# Patient Record
Sex: MALE | Race: OTHER | HISPANIC OR LATINO | Employment: UNEMPLOYED | URBAN - METROPOLITAN AREA
[De-identification: names, ages, dates, MRNs, and addresses within clinical notes are randomized per-mention and may not be internally consistent; named-entity substitution may affect disease eponyms.]

---

## 2019-12-22 ENCOUNTER — HOSPITAL ENCOUNTER (EMERGENCY)
Facility: HOSPITAL | Age: 49
Discharge: HOME | End: 2019-12-22
Attending: EMERGENCY MEDICINE
Payer: MEDICAID

## 2019-12-22 VITALS
OXYGEN SATURATION: 99 % | BODY MASS INDEX: 32.89 KG/M2 | SYSTOLIC BLOOD PRESSURE: 151 MMHG | DIASTOLIC BLOOD PRESSURE: 82 MMHG | TEMPERATURE: 98 F | HEART RATE: 82 BPM | RESPIRATION RATE: 18 BRPM | WEIGHT: 210 LBS

## 2019-12-22 DIAGNOSIS — R73.9 HYPERGLYCEMIA: ICD-10-CM

## 2019-12-22 DIAGNOSIS — R10.9 RIGHT FLANK PAIN: Primary | ICD-10-CM

## 2019-12-22 LAB
ALBUMIN SERPL BCP-MCNC: 3.8 G/DL (ref 3.5–5.2)
ALP SERPL-CCNC: 87 U/L (ref 55–135)
ALT SERPL W/O P-5'-P-CCNC: 29 U/L (ref 10–44)
ANION GAP SERPL CALC-SCNC: 12 MMOL/L (ref 8–16)
AST SERPL-CCNC: 15 U/L (ref 10–40)
B-OH-BUTYR BLD STRIP-SCNC: 0.1 MMOL/L (ref 0–0.5)
BACTERIA #/AREA URNS HPF: ABNORMAL /HPF
BASOPHILS # BLD AUTO: 0.02 K/UL (ref 0–0.2)
BASOPHILS NFR BLD: 0.2 % (ref 0–1.9)
BILIRUB SERPL-MCNC: 0.3 MG/DL (ref 0.1–1)
BILIRUB UR QL STRIP: NEGATIVE
BUN SERPL-MCNC: 15 MG/DL (ref 6–20)
CALCIUM SERPL-MCNC: 9.6 MG/DL (ref 8.7–10.5)
CHLORIDE SERPL-SCNC: 104 MMOL/L (ref 95–110)
CK SERPL-CCNC: 34 U/L (ref 20–200)
CLARITY UR: CLEAR
CO2 SERPL-SCNC: 23 MMOL/L (ref 23–29)
COLOR UR: YELLOW
CREAT SERPL-MCNC: 1 MG/DL (ref 0.5–1.4)
DIFFERENTIAL METHOD: ABNORMAL
EOSINOPHIL # BLD AUTO: 0.3 K/UL (ref 0–0.5)
EOSINOPHIL NFR BLD: 3 % (ref 0–8)
ERYTHROCYTE [DISTWIDTH] IN BLOOD BY AUTOMATED COUNT: 11.8 % (ref 11.5–14.5)
EST. GFR  (AFRICAN AMERICAN): >60 ML/MIN/1.73 M^2
EST. GFR  (NON AFRICAN AMERICAN): >60 ML/MIN/1.73 M^2
GLUCOSE SERPL-MCNC: 315 MG/DL (ref 70–110)
GLUCOSE UR QL STRIP: ABNORMAL
HCT VFR BLD AUTO: 41.3 % (ref 40–54)
HGB BLD-MCNC: 14.1 G/DL (ref 14–18)
HGB UR QL STRIP: ABNORMAL
HYALINE CASTS #/AREA URNS LPF: 0 /LPF
KETONES UR QL STRIP: NEGATIVE
LEUKOCYTE ESTERASE UR QL STRIP: NEGATIVE
LIPASE SERPL-CCNC: 40 U/L (ref 4–60)
LYMPHOCYTES # BLD AUTO: 2.5 K/UL (ref 1–4.8)
LYMPHOCYTES NFR BLD: 23.8 % (ref 18–48)
MCH RBC QN AUTO: 27.5 PG (ref 27–31)
MCHC RBC AUTO-ENTMCNC: 34.1 G/DL (ref 32–36)
MCV RBC AUTO: 81 FL (ref 82–98)
MICROSCOPIC COMMENT: ABNORMAL
MONOCYTES # BLD AUTO: 0.7 K/UL (ref 0.3–1)
MONOCYTES NFR BLD: 6.9 % (ref 4–15)
NEUTROPHILS # BLD AUTO: 6.8 K/UL (ref 1.8–7.7)
NEUTROPHILS NFR BLD: 66.1 % (ref 38–73)
NITRITE UR QL STRIP: NEGATIVE
PH UR STRIP: 6 [PH] (ref 5–8)
PLATELET # BLD AUTO: 251 K/UL (ref 150–350)
PMV BLD AUTO: 9.4 FL (ref 9.2–12.9)
POCT GLUCOSE: 260 MG/DL (ref 70–110)
POCT GLUCOSE: 309 MG/DL (ref 70–110)
POTASSIUM SERPL-SCNC: 4.3 MMOL/L (ref 3.5–5.1)
PROT SERPL-MCNC: 7.1 G/DL (ref 6–8.4)
PROT UR QL STRIP: ABNORMAL
RBC # BLD AUTO: 5.12 M/UL (ref 4.6–6.2)
RBC #/AREA URNS HPF: 10 /HPF (ref 0–4)
SODIUM SERPL-SCNC: 139 MMOL/L (ref 136–145)
SP GR UR STRIP: >=1.03 (ref 1–1.03)
URN SPEC COLLECT METH UR: ABNORMAL
UROBILINOGEN UR STRIP-ACNC: NEGATIVE EU/DL
WBC # BLD AUTO: 10.38 K/UL (ref 3.9–12.7)
WBC #/AREA URNS HPF: 3 /HPF (ref 0–5)
YEAST URNS QL MICRO: ABNORMAL

## 2019-12-22 PROCEDURE — 99284 EMERGENCY DEPT VISIT MOD MDM: CPT | Mod: 25

## 2019-12-22 PROCEDURE — 81000 URINALYSIS NONAUTO W/SCOPE: CPT

## 2019-12-22 PROCEDURE — 85025 COMPLETE CBC W/AUTO DIFF WBC: CPT

## 2019-12-22 PROCEDURE — 82962 GLUCOSE BLOOD TEST: CPT

## 2019-12-22 PROCEDURE — 80053 COMPREHEN METABOLIC PANEL: CPT

## 2019-12-22 PROCEDURE — 82550 ASSAY OF CK (CPK): CPT

## 2019-12-22 PROCEDURE — 63600175 PHARM REV CODE 636 W HCPCS: Performed by: EMERGENCY MEDICINE

## 2019-12-22 PROCEDURE — 96374 THER/PROPH/DIAG INJ IV PUSH: CPT

## 2019-12-22 PROCEDURE — 82010 KETONE BODYS QUAN: CPT

## 2019-12-22 PROCEDURE — 96361 HYDRATE IV INFUSION ADD-ON: CPT

## 2019-12-22 PROCEDURE — 83690 ASSAY OF LIPASE: CPT

## 2019-12-22 RX ORDER — ORPHENADRINE CITRATE 100 MG/1
100 TABLET, EXTENDED RELEASE ORAL 2 TIMES DAILY PRN
Qty: 20 TABLET | Refills: 0 | Status: SHIPPED | OUTPATIENT
Start: 2019-12-22 | End: 2020-01-14

## 2019-12-22 RX ORDER — SODIUM CHLORIDE 9 MG/ML
1000 INJECTION, SOLUTION INTRAVENOUS
Status: COMPLETED | OUTPATIENT
Start: 2019-12-22 | End: 2019-12-22

## 2019-12-22 RX ORDER — IBUPROFEN 800 MG/1
800 TABLET ORAL EVERY 6 HOURS PRN
Qty: 15 TABLET | Refills: 0 | Status: SHIPPED | OUTPATIENT
Start: 2019-12-22 | End: 2019-12-25

## 2019-12-22 RX ORDER — KETOROLAC TROMETHAMINE 30 MG/ML
15 INJECTION, SOLUTION INTRAMUSCULAR; INTRAVENOUS
Status: COMPLETED | OUTPATIENT
Start: 2019-12-22 | End: 2019-12-22

## 2019-12-22 RX ADMIN — KETOROLAC TROMETHAMINE 15 MG: 30 INJECTION, SOLUTION INTRAMUSCULAR at 07:12

## 2019-12-22 RX ADMIN — SODIUM CHLORIDE 1000 ML: 0.9 INJECTION, SOLUTION INTRAVENOUS at 08:12

## 2019-12-23 NOTE — ED NOTES
Physician in room to discuss diagnosis, discharge, and follow up. Pt verbalizes understanding of instructions and prescriptions. Pt also verbalizes understanding of ambulatory referral recommendation.

## 2019-12-23 NOTE — ED PROVIDER NOTES
"Encounter Date: 12/22/2019    SCRIBE #1 NOTE: I, Jesus Alberto Bain, am scribing for, and in the presence of,  Dr. Barreto. I have scribed the following portions of the note - Other sections scribed: HPI, ROS, PE.       History     Chief Complaint   Patient presents with    Back Pain     right sided back pain, that radiates toward right flank x 1 mth, went to ED 2 weeks ago and was dx with muscle pain, had neg CT scan      This is a 49 y.o. Male with history of DM who presents to the ED complaining of lower back pain for several weeks.  His pain is constant. Patient describes his pain "a cut inside his back," "swollen," "muscles are opening/stretched," and as a "itching on the inside." He describes an intermittent numbness to his right leg, but patient is capable of walking when this occurs. Rubbing your fingers on the RLQ occasionally produces pain, but not palpation. 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 Patient took motrin to mild alleviation. A CT scan and lab work were done at a medical facility in [place] which resulted negative for kidney stones and lab work revealed mildly elevated pancreas levels. Patient denies dysuria, hematuria, and rash. He is not compliant with his metformin.       The history is provided by the patient and the spouse. A  was used (Patient's spouse).     Review of patient's allergies indicates:  No Known Allergies  Past Medical History:   Diagnosis Date    Diabetes mellitus      No past surgical history on file.  No family history on file.  Social History     Tobacco Use    Smoking status: Light Tobacco Smoker    Smokeless tobacco: Never Used   Substance Use Topics    Alcohol use: Yes     Comment: 2-3 beers on some weekends    Drug use: Never     Review of Systems   Constitutional: Negative for fever.   HENT: Negative for sore throat.    Respiratory: Negative for shortness of breath.    Cardiovascular: Negative for " chest pain.   Gastrointestinal: Negative for nausea.   Genitourinary: Negative for dysuria and hematuria.   Musculoskeletal: Positive for back pain (with sensation of swelling).   Skin: Negative for rash.   Neurological: Negative for weakness.   Hematological: Does not bruise/bleed easily.       Physical Exam     Initial Vitals [12/22/19 1839]   BP Pulse Resp Temp SpO2   (!) 159/85 83 17 98.8 °F (37.1 °C) 99 %      MAP       --         Physical Exam    Nursing note and vitals reviewed.  Constitutional: He appears well-developed and well-nourished. He is not diaphoretic. No distress.   HENT:   Head: Normocephalic and atraumatic.   Eyes: Conjunctivae are normal.   Neck: Neck supple.   Cardiovascular: Normal rate, regular rhythm, normal heart sounds and intact distal pulses. Exam reveals no gallop and no friction rub.    No murmur heard.  Pulmonary/Chest: Breath sounds normal. No respiratory distress. He has no wheezes. He has no rhonchi. He has no rales.   Abdominal: Soft.   Musculoskeletal: Normal range of motion.        Cervical back: He exhibits normal range of motion, no tenderness and no bony tenderness.        Thoracic back: He exhibits normal range of motion and no tenderness.        Lumbar back: He exhibits normal range of motion, no tenderness and no bony tenderness.        Back:    Right Flank Tenderness, but no true CVA tenderness. No skin changes, rashes, flank ecchymosis, or other cutaneous abnormalities noted.    Neurological: He is alert and oriented to person, place, and time. He has normal strength. No cranial nerve deficit. Coordination normal. GCS eye subscore is 4. GCS verbal subscore is 5. GCS motor subscore is 6.   Skin: No rash noted. No erythema.         ED Course   Procedures  Labs Reviewed   CBC W/ AUTO DIFFERENTIAL - Abnormal; Notable for the following components:       Result Value    Mean Corpuscular Volume 81 (*)     All other components within normal limits   COMPREHENSIVE METABOLIC  PANEL - Abnormal; Notable for the following components:    Glucose 315 (*)     All other components within normal limits   URINALYSIS, REFLEX TO URINE CULTURE - Abnormal; Notable for the following components:    Specific Gravity, UA >=1.030 (*)     Protein, UA 2+ (*)     Glucose, UA 3+ (*)     Occult Blood UA 2+ (*)     All other components within normal limits    Narrative:     Preferred Collection Type->Urine, Clean Catch   URINALYSIS MICROSCOPIC - Abnormal; Notable for the following components:    RBC, UA 10 (*)     All other components within normal limits    Narrative:     Preferred Collection Type->Urine, Clean Catch   POCT GLUCOSE - Abnormal; Notable for the following components:    POCT Glucose 309 (*)     All other components within normal limits   POCT GLUCOSE - Abnormal; Notable for the following components:    POCT Glucose 260 (*)     All other components within normal limits   LIPASE   BETA - HYDROXYBUTYRATE, SERUM   CK   CK          Imaging Results          X-Ray Lumbar Spine Ap And Lateral (Final result)  Result time 12/22/19 20:23:00    Final result by Ehsan Joseph MD (12/22/19 20:23:00)                 Impression:      No evidence of acute fracture or listhesis of the lumbar spine.    No advanced erosive changes.  MRI of the lumbar spine may be obtained for further evaluation, as clinically warranted.      Electronically signed by: Ehsan Joseph MD  Date:    12/22/2019  Time:    20:23             Narrative:    EXAMINATION:  XR LUMBAR SPINE AP AND LATERAL    CLINICAL HISTORY:  Low back pain, <6wks, no red flags, no prior management;Lumbar radiculopathy, <6wks, no red flags, no prior management;    TECHNIQUE:  AP, lateral and spot images were performed of the lumbar spine.    COMPARISON:  CT scan of the abdomen pelvis dated 12/07/2019.    FINDINGS:  The lumbar alignment is maintained.  The vertebral body heights are maintained.  There is hypertrophy of the posterior elements.  The transverse processes  are intact.  No significant intervertebral disc space narrowing is identified.  The sacroiliac joints are unremarkable.  The paraspinal soft tissues are within normal limits.  There is no evidence of acute fracture or listhesis of the lumbar spine.                                 Medical Decision Making:   ED Management:  - CBC w/diff WNL; H/H stable; no significant leukocytosis  - CMP notable for BG of 315;  no significant electrolyte abnormality; renal function WNL  - Lipase WNL  - CK WNL   - Beta-Hydroxy WNL   - Pt administered IVF; repeat   - Plain radiograph of lumbar spine negative for acute fracture, dislocation or other osseous abnormality per my interpretation, final radiology read   - Pt administered IV ketorolac with improvement of symptoms  - UA with 2+ occult blood, 10 RBCs  - I offered the patient the option of getting a CT renal stone study to evaluate for possible kidney stone, but pt declined as he had a CT done on 12/7/19  - I will give pt a rx for Norflex, ibuprofen (Patient denies any history or current GI bleeding, renal disease, or current use of blood thinners) prn pain; also recommended heating pad, and if possible, decreased physical exertion at work as that may be exacerbating his symptoms (pt states that he has continued to work despite the pain; 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   - The patient is at low risk for an emergent medical condition at this time, and I am of the  belief that that it is safe to discharge the patient from the emergency department.   - The patient is instructed to follow up as outpatient as indicated on the discharge paperwork.    - I have discussed the specifics of the workup with the patient and the patient has verbalized understanding of the details of the workup, the diagnosis, the treatment plan, and the need for outpatient follow-up.    - Although the patient has no emergent etiology today this does not preclude the development of an emergent condition so, in addition, I have advised the patient that they can return to the ED and/or activate EMS at any time with worsening of their symptoms, change of their symptoms, or with any other medical complaint.    - The patient remained comfortable and stable during their visit in the ED.    - Discharge and follow-up instructions discussed with the patient who expressed understanding and willingness to comply with my recommendations.  - Results of all emergency department tests  discussed thoroughly with patient; all patient questions answered; pt in agreement with plan  - Pt instructed to follow up with PCP in 2-3 days for recheck of today's complaints  - Pt given strict emergency department return precautions for any new or worsening of symptoms  - Pt discharged from the emergency department in stable condition, in no acute distress              Scribe Attestation:   Scribe #1: I performed the above scribed service and the documentation accurately describes the services I performed. I attest to the accuracy of the note.                          Clinical Impression:       ICD-10-CM ICD-9-CM   1. Right flank pain R10.9 789.09   2. Hyperglycemia R73.9 790.29            I, Kevin Barreto,  personally performed the services described in this documentation. All medical record entries made by the scribe were at my direction and in my presence.  I have reviewed the chart and agree that the record reflects my personal  performance and is accurate and complete. Kevin Barreto M.D. 10:30 PM12/22/2019                 Kevin Barreto MD  12/22/19 0207

## 2019-12-23 NOTE — ED NOTES
Pt is alert and oriented. C/O right flank pain x one month. He states he was seen in the ER two weeks ago where he was diagnosed with muscle pain and states his CT was negative at that time. States the pain is a 6/10 right now but is sometimes higher than that. The pain is in the right flank area, is tender to palpation and sometimes radiates around his torse to the RUQ area. He says his skin is very tender to light touch at times. Denies painful urination, no trouble urinating, no blood in urine. Denies chest pain and sob. Denies n/v. Is taking Motrin at home with minimal relief. He was prescribed a muscle relaxer when he was seen in the ER two weeks ago without relief also. Denies a recent skin rash. Some tenderness to RUQ with palpation.

## 2020-01-14 ENCOUNTER — OFFICE VISIT (OUTPATIENT)
Dept: FAMILY MEDICINE | Facility: HOSPITAL | Age: 50
End: 2020-01-14
Attending: EMERGENCY MEDICINE
Payer: MEDICAID

## 2020-01-14 VITALS
SYSTOLIC BLOOD PRESSURE: 160 MMHG | DIASTOLIC BLOOD PRESSURE: 91 MMHG | HEART RATE: 79 BPM | WEIGHT: 214.75 LBS | BODY MASS INDEX: 34.51 KG/M2 | HEIGHT: 66 IN

## 2020-01-14 DIAGNOSIS — I10 ESSENTIAL HYPERTENSION: ICD-10-CM

## 2020-01-14 DIAGNOSIS — R10.9 ACUTE RIGHT FLANK PAIN: ICD-10-CM

## 2020-01-14 DIAGNOSIS — E11.65 TYPE 2 DIABETES MELLITUS WITH HYPERGLYCEMIA, WITHOUT LONG-TERM CURRENT USE OF INSULIN: ICD-10-CM

## 2020-01-14 DIAGNOSIS — R31.29 OTHER MICROSCOPIC HEMATURIA: Primary | ICD-10-CM

## 2020-01-14 LAB
BILIRUB SERPL-MCNC: ABNORMAL MG/DL
BLOOD, POC UA: 250
GLUCOSE UR QL STRIP: 50
KETONES UR QL STRIP: NEGATIVE
LEUKOCYTE ESTERASE URINE, POC: NEGATIVE
NITRITE, POC UA: NEGATIVE
PH, POC UA: 5
PROTEIN, POC: 500
SPECIFIC GRAVITY, POC UA: 1.03
UROBILINOGEN, POC UA: NEGATIVE

## 2020-01-14 PROCEDURE — 99213 OFFICE O/P EST LOW 20 MIN: CPT | Performed by: STUDENT IN AN ORGANIZED HEALTH CARE EDUCATION/TRAINING PROGRAM

## 2020-01-14 PROCEDURE — 81003 URINALYSIS AUTO W/O SCOPE: CPT | Performed by: STUDENT IN AN ORGANIZED HEALTH CARE EDUCATION/TRAINING PROGRAM

## 2020-01-14 RX ORDER — INSULIN PUMP SYRINGE, 3 ML
EACH MISCELLANEOUS
Qty: 1 EACH | Refills: 0 | Status: SHIPPED | OUTPATIENT
Start: 2020-01-14 | End: 2023-10-30

## 2020-01-14 RX ORDER — GLYBURIDE 2.5 MG/1
2.5 TABLET ORAL
Qty: 90 TABLET | Refills: 3 | Status: SHIPPED | OUTPATIENT
Start: 2020-01-14 | End: 2023-10-20

## 2020-01-14 RX ORDER — METFORMIN HYDROCHLORIDE 1000 MG/1
1000 TABLET ORAL 2 TIMES DAILY WITH MEALS
Qty: 180 TABLET | Refills: 0 | Status: SHIPPED | OUTPATIENT
Start: 2020-01-14 | End: 2020-04-16

## 2020-01-14 RX ORDER — LANCETS 33 GAUGE
1 EACH MISCELLANEOUS DAILY
Qty: 100 EACH | Refills: 11 | Status: SHIPPED | OUTPATIENT
Start: 2020-01-14 | End: 2020-07-20

## 2020-01-14 RX ORDER — LISINOPRIL 20 MG/1
20 TABLET ORAL DAILY
Qty: 90 TABLET | Refills: 3 | Status: SHIPPED | OUTPATIENT
Start: 2020-01-14 | End: 2021-01-13

## 2020-01-14 NOTE — PROGRESS NOTES
Progress Note  [facility]    Subjective:      Wai Bain is a 49 y.o. male with past medical history of T2DM and HTN who is presenting for hospital follow up for right flank/back pain that was worked up in the emergency department twice on 12/7/19 and 12/22/19.     On 12/7, he reported recently lifting a heavy ice chest and pain subsequently following, no other symptoms at that time. Did have elevated lipase but everything else was unremarkable and he was d/c home with robaxin.     On 12/22, he returned to ED with similar symptoms were initially concerning to them for kidney stones but CT and labs were negative for such. Labs wnl, but did have microscopic hematuria. Pt symptoms were thought to be MSK and was to follow up in our clinic.    Pt presents to clinic today with continued flank and back pain, sharp in nature, radiating to his armpit and to his RUQ he states. He thinks it has gotten a little better since going to the ER. Denies any other symptoms. Aggravating factors include working and moving a lot.     Pt also here to establish care and to see someone to manage his HTN and DM for which the only medication he was on is metformin that he states he gets from [place]. Has not been on insulin. BP elevated today in clinic. Blood glucose in hospital charts was 200s, last A1c 13.4 12/7.     UA done in office showing microscopic hematuria and bilirubinuria        Review of Systems   Constitutional: Negative.  Negative for chills and fever.   HENT: Negative.    Eyes: Negative.    Respiratory: Negative for cough and shortness of breath.    Cardiovascular: Negative.  Negative for chest pain and palpitations.   Gastrointestinal: Negative.  Negative for abdominal pain.   Genitourinary: Positive for flank pain. Negative for dysuria, frequency and urgency.   Musculoskeletal: Positive for back pain and myalgias.   Skin: Negative.    Neurological: Negative.    Endo/Heme/Allergies: Negative.   "  Psychiatric/Behavioral: Negative.          Objective:   BP (!) 160/91   Pulse 79   Ht 5' 6" (1.676 m)   Wt 97.4 kg (214 lb 11.7 oz)   BMI 34.66 kg/m²      Physical Exam   Constitutional: He is oriented to person, place, and time. He appears well-developed and well-nourished.   HENT:   Head: Normocephalic and atraumatic.   Eyes: Pupils are equal, round, and reactive to light. Conjunctivae and EOM are normal.   Neck: Normal range of motion. Neck supple.   Cardiovascular: Normal rate, regular rhythm, normal heart sounds and intact distal pulses.   No murmur heard.  Pulmonary/Chest: Effort normal and breath sounds normal.   Abdominal: Soft. Bowel sounds are normal.   Musculoskeletal: Normal range of motion.        Back:    Neurological: He is alert and oriented to person, place, and time.   Skin: Skin is warm and dry.   Psychiatric: He has a normal mood and affect. His behavior is normal. Judgment and thought content normal.   Nursing note and vitals reviewed.       Assessment:   49 y.o. M with unresolved right flank/back pain, persistent microscopic hematuria shown on POCT UA, previous ER work up negative for stones on CT but no bladder or urinary tract work up done. Hematuria in 48 yo warrants urology referral.     T2DM uncontrolled, not on any medications for HTN     1. Other microscopic hematuria    2. Type 2 diabetes mellitus with hyperglycemia, without long-term current use of insulin    3. Essential hypertension    4. Chronic right flank pain    5. Acute right flank pain         Plan:   -Referral to urology   -Refill metformin, start oral med glyburide as pt does not want insulin or injectable medication at this point, will try oral meds and diet and wants to see if can improve without having to go to insulin.   -Start on Ace inhibitor     Other microscopic hematuria  -     Lipid panel; Future; Expected date: 01/14/2020  -     TSH; Future; Expected date: 01/14/2020  -     Microalbumin/creatinine urine " ratio  -     Comprehensive metabolic panel; Future; Expected date: 01/14/2020  -     CBC auto differential; Future; Expected date: 01/14/2020  -     Ambulatory referral to Urology    Type 2 diabetes mellitus with hyperglycemia, without long-term current use of insulin  -     metFORMIN (GLUCOPHAGE) 1000 MG tablet; Take 1 tablet (1,000 mg total) by mouth 2 (two) times daily with meals.  Dispense: 180 tablet; Refill: 0  -     lisinopril (PRINIVIL,ZESTRIL) 20 MG tablet; Take 1 tablet (20 mg total) by mouth once daily.  Dispense: 90 tablet; Refill: 3  -     glyBURIDE (DIABETA) 2.5 MG tablet; Take 1 tablet (2.5 mg total) by mouth daily with breakfast.  Dispense: 90 tablet; Refill: 3  -     blood-glucose meter kit; USE AS DIRECTED  Dispense: 1 each; Refill: 0  -     lancets 33 gauge Misc; Test blood sugar once daily  Dispense: 100 each; Refill: 11  -     blood sugar diagnostic Strp; USE TO TEST BLOOD SUGAR ONCE DAILY  Dispense: 100 strip; Refill: 11  -     Lipid panel; Future; Expected date: 01/14/2020  -     TSH; Future; Expected date: 01/14/2020  -     Microalbumin/creatinine urine ratio  -     Comprehensive metabolic panel; Future; Expected date: 01/14/2020  -     CBC auto differential; Future; Expected date: 01/14/2020  -     POCT URINALYSIS    Essential hypertension  -     Lipid panel; Future; Expected date: 01/14/2020  -     TSH; Future; Expected date: 01/14/2020  -     Microalbumin/creatinine urine ratio  -     Comprehensive metabolic panel; Future; Expected date: 01/14/2020  -     CBC auto differential; Future; Expected date: 01/14/2020    Acute right flank pain  -     Lipid panel; Future; Expected date: 01/14/2020  -     TSH; Future; Expected date: 01/14/2020  -     Microalbumin/creatinine urine ratio  -     Comprehensive metabolic panel; Future; Expected date: 01/14/2020  -     CBC auto differential; Future; Expected date: 01/14/2020  -     Ambulatory referral to Urology  -     POCT URINALYSIS        The  patient's diagnosis and medications were discussed.    I will review labs and notify patient with results either by mail or contact by phone.      01/14/2020  Dayron Buckner M.D., M.Sc.  [facility] PGY-1  Pager: [phone]  Ph: [phone]    *This note was dictated using the M*Modal Fluency Direct word recognition program. There are word rescognition mistakes that are occasionally missed on review.

## 2020-01-21 ENCOUNTER — OFFICE VISIT (OUTPATIENT)
Dept: UROLOGY | Facility: CLINIC | Age: 50
End: 2020-01-21
Payer: MEDICAID

## 2020-01-21 VITALS
TEMPERATURE: 98 F | RESPIRATION RATE: 18 BRPM | WEIGHT: 219 LBS | HEIGHT: 66 IN | DIASTOLIC BLOOD PRESSURE: 87 MMHG | BODY MASS INDEX: 35.2 KG/M2 | OXYGEN SATURATION: 98 % | SYSTOLIC BLOOD PRESSURE: 139 MMHG | HEART RATE: 78 BPM

## 2020-01-21 DIAGNOSIS — R31.29 MICROSCOPIC HEMATURIA: ICD-10-CM

## 2020-01-21 DIAGNOSIS — N50.819 ORCHALGIA: ICD-10-CM

## 2020-01-21 DIAGNOSIS — R10.11 RUQ ABDOMINAL PAIN: Primary | ICD-10-CM

## 2020-01-21 DIAGNOSIS — R10.9 RIGHT FLANK PAIN: ICD-10-CM

## 2020-01-21 LAB
BILIRUB SERPL-MCNC: NORMAL MG/DL
BLOOD URINE, POC: NORMAL
COLOR, POC UA: YELLOW
GLUCOSE UR QL STRIP: 250
KETONES UR QL STRIP: NORMAL
LEUKOCYTE ESTERASE URINE, POC: NORMAL
NITRITE, POC UA: NORMAL
PH, POC UA: 5.5
PROTEIN, POC: 300
SPECIFIC GRAVITY, POC UA: 1.03
UROBILINOGEN, POC UA: 0.2

## 2020-01-21 PROCEDURE — 99999 PR PBB SHADOW E&M-EST. PATIENT-LVL V: CPT | Mod: PBBFAC,,, | Performed by: NURSE PRACTITIONER

## 2020-01-21 PROCEDURE — 99215 OFFICE O/P EST HI 40 MIN: CPT | Mod: PBBFAC,PO | Performed by: NURSE PRACTITIONER

## 2020-01-21 PROCEDURE — 87086 URINE CULTURE/COLONY COUNT: CPT

## 2020-01-21 PROCEDURE — 99999 PR PBB SHADOW E&M-EST. PATIENT-LVL V: ICD-10-PCS | Mod: PBBFAC,,, | Performed by: NURSE PRACTITIONER

## 2020-01-21 PROCEDURE — 99204 OFFICE O/P NEW MOD 45 MIN: CPT | Mod: S$PBB,,, | Performed by: NURSE PRACTITIONER

## 2020-01-21 PROCEDURE — 81002 URINALYSIS NONAUTO W/O SCOPE: CPT | Mod: PBBFAC,PO | Performed by: NURSE PRACTITIONER

## 2020-01-21 PROCEDURE — 99204 PR OFFICE/OUTPT VISIT, NEW, LEVL IV, 45-59 MIN: ICD-10-PCS | Mod: S$PBB,,, | Performed by: NURSE PRACTITIONER

## 2020-01-21 RX ORDER — KETOROLAC TROMETHAMINE 10 MG/1
10 TABLET, FILM COATED ORAL EVERY 6 HOURS PRN
Qty: 20 TABLET | Refills: 0 | Status: SHIPPED | OUTPATIENT
Start: 2020-01-21 | End: 2020-01-26

## 2020-01-21 NOTE — PATIENT INSTRUCTIONS
1. Bladder scan (PVR)  2. Urine dipstick  3. Urine cx  4. U/S retroperitoneal complete  5. U/S scrotum and testicles  6. PSA, total and free (no ejaculation for 72 hours prior to lab).   7. Follow-up pending urine cx and U/S results.

## 2020-01-21 NOTE — LETTER
January 22, 2020      Dayron Buckner MD  [address] [facility] [address]           [facility]  [address] [place] [address], [address]  [facility] [address]  Phone: [phone]  Fax: [phone]          Patient: Wai Bain   MR Number: [id number]   YOB: 1970   Date of Visit: 1/21/2020       Dear Dr. Dayron Buckner:    Thank you for referring Wai Bain to me for evaluation. Attached you will find relevant portions of my assessment and plan of care.    If you have questions, please do not hesitate to call me. I look forward to following Wai Bain along with you.    Sincerely,    Rosario Maki, NP    Enclosure  CC:  No Recipients    If you would like to receive this communication electronically, please contact [email or web address] or [phone] to request more information on [employer] Link access.    For providers and/or their staff who would like to refer a patient to [facility], please contact us through our one-stop-shop provider referral line, [facility] , at [phone].    If you feel you have received this communication in error or would no longer like to receive these types of communications, please e-mail [email or web address]

## 2020-01-21 NOTE — PROGRESS NOTES
Subjective:       Patient ID: Wai Bain is a 49 y.o. male.    Chief Complaint: Hematuria (referred by dr nav garza) and Flank Pain (right side)    Patient is new to me. He is a 48 yo  male. Primary language is Japanese. Patient's wife (Jana) present during visit to help with translation. Patient has c/o RUQ abdominal pain/burning and right flank pain. He's also here for evaluation of microscopic hematuria that was noted by his PCP on 1/14/2020 and ER visit on 12/22/19 and 12/7/19.     Hematuria   This is a new problem. Episode onset: 12/2019. He describes the hematuria as microscopic hematuria. His pain is at a severity of 0/10. He is experiencing no pain. He describes his urine color as yellow. Irritative symptoms do not include frequency, nocturia or urgency. Associated symptoms include abdominal pain (RUQ burning pain) and flank pain. Pertinent negatives include no chills, dysuria, facial swelling, fever, genital pain, hesitancy, inability to urinate, nausea, vomiting or sore throat. He is sexually active. His past medical history is significant for hypertension. There is no history of kidney stones or UTI.   Flank Pain   This is a chronic problem. Episode onset: 2 months ago. The problem occurs daily. The problem has been gradually improving since onset. The pain is present in the costovertebral angle (right). The quality of the pain is described as aching. The pain is at a severity of 2/10. The pain is mild. Associated symptoms include abdominal pain (RUQ burning pain). Pertinent negatives include no bladder incontinence, bowel incontinence, dysuria, fever, headaches, weakness or weight loss. Risk factors include lack of exercise, obesity and sedentary lifestyle. He has tried NSAIDs for the symptoms. The treatment provided mild relief.   Abdominal Pain   This is a new problem. Episode onset: 2 months ago. The onset quality is undetermined. The problem occurs constantly. The problem has  been waxing and waning. The pain is located in the RUQ. The pain is at a severity of 4/10. The pain is moderate. The quality of the pain is burning. The abdominal pain does not radiate. Associated symptoms include constipation. Pertinent negatives include no anorexia, arthralgias, belching, diarrhea, dysuria, fever, flatus, frequency, headaches, hematuria, nausea, vomiting or weight loss. Nothing aggravates the pain. The pain is relieved by nothing. He has tried nothing for the symptoms. There is no history of abdominal surgery, colon cancer, Crohn's disease, gallstones, GERD, irritable bowel syndrome, pancreatitis, PUD or ulcerative colitis. Patient's medical history does not include kidney stones and UTI.     Review of Systems   Constitutional: Negative for appetite change, chills, fatigue, fever and weight loss.   HENT: Negative for facial swelling and sore throat.    Gastrointestinal: Positive for abdominal pain (RUQ burning pain) and constipation. Negative for anorexia, bowel incontinence, diarrhea, flatus, nausea and vomiting.   Genitourinary: Positive for flank pain and testicular pain (bilateral; mild ache). Negative for bladder incontinence, decreased urine volume, difficulty urinating, dysuria, frequency, hematuria, hesitancy, nocturia, penile swelling, scrotal swelling and urgency.        Feeling of incomplete bladder emptying.    Musculoskeletal: Negative for arthralgias.   Neurological: Negative for dizziness, weakness and headaches.   Psychiatric/Behavioral: Negative.        Objective:      Physical Exam   Constitutional: He is oriented to person, place, and time. He appears well-developed and well-nourished. No distress.   HENT:   Head: Normocephalic and atraumatic.   Eyes: Pupils are equal, round, and reactive to light. EOM are normal.   Neck: Normal range of motion.   Cardiovascular: Normal rate.   Pulmonary/Chest: Effort normal. No respiratory distress.   Abdominal: Soft. There is no tenderness.    Genitourinary:   Genitourinary Comments: PVR = 33 mL   Musculoskeletal: Normal range of motion. He exhibits no edema.   Neurological: He is alert and oriented to person, place, and time. Coordination normal.   Skin: Skin is warm and dry.   Psychiatric: He has a normal mood and affect. His behavior is normal. Judgment and thought content normal.   Nursing note and vitals reviewed.      Assessment:       1. RUQ abdominal pain    2. Right flank pain    3. Microscopic hematuria        Plan:       Wai CINTRON was seen today for hematuria and flank pain.    Diagnoses and all orders for this visit:    RUQ abdominal pain  -     US Retroperitoneal Complete (Kidney and; Future  -     POCT URINE DIPSTICK WITHOUT MICROSCOPE  -     Urine culture  -     ketorolac (TORADOL) 10 mg tablet; Take 1 tablet (10 mg total) by mouth every 6 (six) hours as needed for Pain.  -     PSA, total and free; Future    Right flank pain  -     US Retroperitoneal Complete (Kidney and; Future  -     POCT URINE DIPSTICK WITHOUT MICROSCOPE  -     Urine culture  -     ketorolac (TORADOL) 10 mg tablet; Take 1 tablet (10 mg total) by mouth every 6 (six) hours as needed for Pain.  -     PSA, total and free; Future    Microscopic hematuria  -     US Retroperitoneal Complete (Kidney and; Future  -     POCT URINE DIPSTICK WITHOUT MICROSCOPE  -     Urine culture  -     PSA, total and free; Future    Orchalgia  -     US Scrotum And Testicles; Future    Other order  1. Bladder scan (PVR)  2. If urine cx and U/S is normal, refer patient to Gastro for evaluation of RUQ abdominal pain. CT performed on 12/7/19 with no significant findings.     Follow-up pending urine cx and U/S results.    Rosario Maki NP

## 2020-01-21 NOTE — PROGRESS NOTES
I have reviewed the notes, assessments, and/or procedures performed by Dr. Buckner, I concur with her/his documentation of Wai Bain.

## 2020-01-22 LAB — BACTERIA UR CULT: NO GROWTH

## 2020-01-23 ENCOUNTER — TELEPHONE (OUTPATIENT)
Dept: UROLOGY | Facility: CLINIC | Age: 50
End: 2020-01-23

## 2020-01-23 NOTE — TELEPHONE ENCOUNTER
----- Message from Rosario Maki NP sent at 1/23/2020 10:46 AM CST -----  Please inform patient's wife (Jana) that patient's urine cx was normal. He does not have a UTI. Patient's primary language is French. His wife speaks English.

## 2020-01-28 ENCOUNTER — TELEPHONE (OUTPATIENT)
Dept: UROLOGY | Facility: CLINIC | Age: 50
End: 2020-01-28

## 2020-01-28 DIAGNOSIS — R10.11 RUQ ABDOMINAL PAIN: Primary | ICD-10-CM

## 2020-01-28 NOTE — TELEPHONE ENCOUNTER
----- Message from Rosario Maki NP sent at 1/28/2020 11:16 AM CST -----  Please inform patient his renal and bladder U/S was normal.

## 2020-01-28 NOTE — TELEPHONE ENCOUNTER
----- Message from Rosario Maki NP sent at 1/28/2020 11:16 AM CST -----  Please inform patient his PSA was normal (0.37 ng/mL).

## 2020-02-03 ENCOUNTER — TELEPHONE (OUTPATIENT)
Dept: GASTROENTEROLOGY | Facility: CLINIC | Age: 50
End: 2020-02-03

## 2020-02-03 NOTE — TELEPHONE ENCOUNTER
----- Message from Meg Peralta sent at 2/3/2020  9:34 AM CST -----  Contact: Pt   Pt is requesting an appt as soon as possible   Pt has a new patient appt scheduled for 03/26/2020   Pt stated he is in a lot of pain and wants to be seen sooner     Pt can be reached at [phone]

## 2020-02-11 ENCOUNTER — OFFICE VISIT (OUTPATIENT)
Dept: FAMILY MEDICINE | Facility: HOSPITAL | Age: 50
End: 2020-02-11
Attending: FAMILY MEDICINE
Payer: MEDICAID

## 2020-02-11 VITALS
HEIGHT: 67 IN | DIASTOLIC BLOOD PRESSURE: 82 MMHG | WEIGHT: 213.88 LBS | BODY MASS INDEX: 33.57 KG/M2 | HEART RATE: 83 BPM | SYSTOLIC BLOOD PRESSURE: 136 MMHG

## 2020-02-11 DIAGNOSIS — R10.11 RUQ ABDOMINAL PAIN: ICD-10-CM

## 2020-02-11 DIAGNOSIS — K59.00 CONSTIPATION, UNSPECIFIED CONSTIPATION TYPE: ICD-10-CM

## 2020-02-11 DIAGNOSIS — E11.43 DIABETIC GASTROPARESIS: ICD-10-CM

## 2020-02-11 DIAGNOSIS — I10 ESSENTIAL HYPERTENSION: ICD-10-CM

## 2020-02-11 DIAGNOSIS — K31.84 DIABETIC GASTROPARESIS: ICD-10-CM

## 2020-02-11 DIAGNOSIS — E11.65 TYPE 2 DIABETES MELLITUS WITH HYPERGLYCEMIA, WITHOUT LONG-TERM CURRENT USE OF INSULIN: Primary | ICD-10-CM

## 2020-02-11 PROCEDURE — 99213 OFFICE O/P EST LOW 20 MIN: CPT | Performed by: STUDENT IN AN ORGANIZED HEALTH CARE EDUCATION/TRAINING PROGRAM

## 2020-02-11 RX ORDER — POLYETHYLENE GLYCOL 3350 17 G/17G
17 POWDER, FOR SOLUTION ORAL DAILY PRN
Qty: 510 G | Refills: 1 | Status: SHIPPED | OUTPATIENT
Start: 2020-02-11 | End: 2023-04-03

## 2020-02-11 RX ORDER — METOCLOPRAMIDE 10 MG/1
10 TABLET ORAL
Qty: 90 TABLET | Refills: 1 | Status: SHIPPED | OUTPATIENT
Start: 2020-02-11 | End: 2020-03-10

## 2020-02-12 NOTE — PROGRESS NOTES
Progress Note  [facility]    Subjective:      Wai Bain is a 49 y.o. male with past medical history of type 2 diabetes mellitus and hypertension who is here for follow-up.  Patient has had a couple hospital admissions for flank and back pain but was discharged as CT scan and labs were negative.  Patient has had history of microscopic hematuria and went initially saw me in clinic had continued hematuria on urinalysis.  I had referred patient to Urology where workup was determined to be negative that included normal PSA and normal ultrasounds.  Patient was also referred to gastroenterology and has an appointment coming up.    Patient returns to me still having some mild right upper quadrant discomfort but states that his back pain has been resolved.  Patient reveals to me that he has also been constipated hands sometimes does not have a bowel movement for several days.  His constipation is associated with increasingly worse pain in the upper right quadrant of his abdomen.  Patient states he has been taking prune juice which assist him in having a bowel movement and us relieves his abdominal pain temporarily.  Patient is also seeing me for uncontrolled type 2 diabetes mellitus.  When patient had seen me initially he had been off of his diabetes medications for some time and we restarted his metformin along with adding glyburide as patient did not want anything other than oral medications.  Patient's most recent A1c in December was almost 14.  Patient states he has since been compliant with diabetes medication and rehab plan to get a new A1c in the next couple weeks.    Review of Systems   Constitutional: Negative.  Negative for chills and fever.   HENT: Negative.    Eyes: Negative.    Respiratory: Negative for cough and shortness of breath.    Cardiovascular: Negative.  Negative for chest pain and palpitations.   Gastrointestinal: Positive for abdominal pain.   Genitourinary: Negative.   "  Musculoskeletal: Negative.    Skin: Negative.    Neurological: Negative.    Endo/Heme/Allergies: Negative.    Psychiatric/Behavioral: Negative.          Objective:   /82   Pulse 83   Ht 5' 7" (1.702 m)   Wt 97 kg (213 lb 13.5 oz)   BMI 33.49 kg/m²      Physical Exam   Constitutional: He is oriented to person, place, and time and well-developed, well-nourished, and in no distress.   HENT:   Head: Normocephalic and atraumatic.   Eyes: Pupils are equal, round, and reactive to light.   Cardiovascular: Normal rate, regular rhythm, normal heart sounds and intact distal pulses.   Pulmonary/Chest: Effort normal and breath sounds normal.   Abdominal: Soft. Bowel sounds are normal.   Musculoskeletal: Normal range of motion.   Neurological: He is alert and oriented to person, place, and time.   Psychiatric: Affect normal.        Assessment:   49 y.o. with type 2 diabetes mellitus, hypertension, constipation here for follow-up.       1. Type 2 diabetes mellitus with hyperglycemia, without long-term current use of insulin    2. Diabetic gastroparesis    3. Constipation, unspecified constipation type    4. Essential hypertension    5. RUQ abdominal pain         Plan:   Patient continues to have right upper quadrant abdominal pain.  History of microscopic hematuria was referred to urology with negative workup.  Patient has appointment for Gastroenterology.  Back pain has resolved but abdominal pain is still present complicated in worsened by constipation.  Patient also has uncontrolled diabetes which could be leading to worsening of abdominal discomfort and possible gastroparesis.  Will try a course of Reglan and see if this helps with symptoms also plan to repeat A1c as patient states he has been compliant now with diabetes medications and we will be able to appropriately make adjustments to his regimen once we get new labs.  Patient also given a stool softener to use as needed when becomes constipated.    Type 2 " diabetes mellitus with hyperglycemia, without long-term current use of insulin  -     Hemoglobin A1c; Future; Expected date: 02/11/2020    Diabetic gastroparesis  -     metoclopramide HCl (REGLAN) 10 MG tablet; Take 1 tablet (10 mg total) by mouth 3 (three) times daily before meals.  Dispense: 90 tablet; Refill: 1    Constipation, unspecified constipation type  -     polyethylene glycol (GLYCOLAX) 17 gram/dose powder; mix and Take 17 g by mouth daily as needed (for constipation).  Dispense: 510 g; Refill: 1    Essential hypertension    RUQ abdominal pain           The patient's diagnosis and medications were discussed.    I will review labs and notify patient with results either by mail or contact by phone.      02/11/2020  Dayron Buckner M.D., M.Sc.  [facility] PGY-1  Pager: [phone]  Ph: [phone]    *This note was dictated using the M*Modal Fluency Direct word recognition program. There are word rescognition mistakes that are occasionally missed on review.

## 2020-02-12 NOTE — PROGRESS NOTES
I assume primary medical responsibility for this patient, I have reviewed the case history, findings, diagnosis and treatment plan with the resident and agree that the care is reasonable and necessary. This service has been performed by a resident without the presence of a teaching physician under the primary care exception  Estefnai Mendez  2/12/2020

## 2020-03-10 ENCOUNTER — OFFICE VISIT (OUTPATIENT)
Dept: GASTROENTEROLOGY | Facility: CLINIC | Age: 50
End: 2020-03-10
Payer: MEDICAID

## 2020-03-10 VITALS
HEART RATE: 80 BPM | BODY MASS INDEX: 33.67 KG/M2 | WEIGHT: 215 LBS | SYSTOLIC BLOOD PRESSURE: 130 MMHG | DIASTOLIC BLOOD PRESSURE: 78 MMHG

## 2020-03-10 DIAGNOSIS — R10.11 RUQ ABDOMINAL PAIN: Primary | ICD-10-CM

## 2020-03-10 DIAGNOSIS — R68.81 EARLY SATIETY: ICD-10-CM

## 2020-03-10 PROCEDURE — 99204 OFFICE O/P NEW MOD 45 MIN: CPT | Mod: S$PBB,,, | Performed by: INTERNAL MEDICINE

## 2020-03-10 PROCEDURE — 99999 PR PBB SHADOW E&M-EST. PATIENT-LVL III: CPT | Mod: PBBFAC,,, | Performed by: INTERNAL MEDICINE

## 2020-03-10 PROCEDURE — 99999 PR PBB SHADOW E&M-EST. PATIENT-LVL III: ICD-10-PCS | Mod: PBBFAC,,, | Performed by: INTERNAL MEDICINE

## 2020-03-10 PROCEDURE — 99204 PR OFFICE/OUTPT VISIT, NEW, LEVL IV, 45-59 MIN: ICD-10-PCS | Mod: S$PBB,,, | Performed by: INTERNAL MEDICINE

## 2020-03-10 PROCEDURE — 99213 OFFICE O/P EST LOW 20 MIN: CPT | Mod: PBBFAC,PO | Performed by: INTERNAL MEDICINE

## 2020-03-10 RX ORDER — PANTOPRAZOLE SODIUM 40 MG/1
40 TABLET, DELAYED RELEASE ORAL DAILY
Qty: 90 TABLET | Refills: 3 | Status: SHIPPED | OUTPATIENT
Start: 2020-03-10 | End: 2023-04-03

## 2020-03-10 NOTE — PATIENT INSTRUCTIONS
EGD Prep Instructions    [facility] [address] [place]  [address]    You are scheduled for an EGD with Dr. العراقي on Friday, March 13 at [facility].  You will enter through the [facility] entrance and check in at Same Day Surgery.    Nothing to eat or drink after midnight before the procedure.  You MAY brush your teeth.    You MAY take your blood pressure, heart, and seizure medication on the morning of the procedure, with a SIP of water.  Hold ALL other medications until after the procedure.    You must have someone with you to DRIVE YOU HOME since you will be receiving IV sedation for the procedure.    If you are on blood thinners THAT YOU HAVE BEEN INSTRUCTED TO HOLD BY YOUR DOCTOR FOR THIS PROCEDURE, then do NOT take this the morning of your EGD.  Do NOT stop these medications on your own, they must be approved to be held by your doctor.  Your EGD can NOT be done if you are on these medications.  Examples of blood thinners include: Coumadin, Aggrenox, Plavix, Pradaxa, Reapro, Pletal, Xarelto, Ticagrelor, Brilinta, Eliquis, and high dose aspirin (325 mg).  You do not have to stop baby aspirin 81 mg.    You will receive a call a few days before your EGD to tell you the time to arrive.  If you have not received a call by the day before your procedure, call the Pre-op Coordinator at [phone].            Abdominal Pain    Abdominal pain is pain in the stomach or belly area. Everyone has this pain from time to time. In many cases it goes away on its own. But abdominal pain can sometimes be due to a serious problem, such as appendicitis. So its important to know when to seek help.  Causes of abdominal pain  There are many possible causes of abdominal pain. Common causes in adults include:  · Constipation, diarrhea, or gas  · Stomach acid flowing back up into the esophagus (acid reflux or heartburn)  · Severe acid reflux, called GERD (gastroesophageal reflux  disease)  · A sore in the lining of the stomach or small intestine (peptic ulcer)  · Inflammation of the gallbladder, liver, or pancreas  · Gallstones or kidney stones  · Appendicitis   · Intestinal blockage   · An internal organ pushing through a muscle or other tissue (hernia)  · Urinary tract infections  · In women, menstrual cramps, fibroids, or endometriosis  · Inflammation or infection of the intestines  Diagnosing the cause of abdominal pain  Your healthcare provider will do a physical exam help find the cause of your pain. If needed, tests will be ordered. Belly pain has many possible causes. So it can be hard to find the reason for your pain. Giving details about your pain can help. Tell your provider where and when you feel the pain, and what makes it better or worse. Also let your provider know if you have other symptoms such as:  · Fever  · Tiredness  · Upset stomach (nausea)  · Vomiting  · Changes in bathroom habits  Treating abdominal pain  Some causes of pain need emergency medical treatment right away. These include appendicitis or a bowel blockage. Other problems can be treated with rest, fluids, or medicines. Your healthcare provider can give you specific instructions for treatment or self-care based on what is causing your pain.  If you have vomiting or diarrhea, sip water or other clear fluids. When you are ready to eat solid foods again, start with small amounts of easy-to-digest, low-fat foods. These include apple sauce, toast, or crackers.   When to seek medical care  Call 911 or go to the hospital right away if you:  · Cant pass stool and are vomiting  · Are vomiting blood or have bloody diarrhea or black, tarry diarrhea  · Have chest, neck, or shoulder pain  · Feel like you might pass out  · Have pain in your shoulder blades with nausea  · Have sudden, severe belly pain  · Have new, severe pain unlike any you have felt before  · Have a belly that is rigid, hard, and tender to touch  Call  your healthcare provider if you have:  · Pain for more than 5 days  · Bloating for more than 2 days  · Diarrhea for more than 5 days  · A fever of 100.4°F (38.0°C) or higher, or as directed by your provider  · Pain that gets worse  · Weight loss for no reason  · Continued lack of appetite  · Blood in your stool  How to prevent abdominal pain  Here are some tips to help prevent abdominal pain:  · Eat smaller amounts of food at one time.  · Avoid greasy, fried, or other high-fat foods.  · Avoid foods that give you gas.  · Exercise regularly.  · Drink plenty of fluids.  To help prevent GERD symptoms:  · Quit smoking.  · Reduce alcohol and certain foods that increase stomach acid.  · Avoid aspirin and over-the-counter pain and fever medicines (NSAIDS or nonsteroidal anti-inflammatory drugs), if possible  · Lose extra weight.  · Finish eating at least 2 hours before you go to bed or lie down.  · Raise the head of your bed.  Date Last Reviewed: 7/1/2016  © 5772-6284 [employer]. [address] [place] [address], [place] [address]. All rights reserved. This information is not intended as a substitute for professional medical care. Always follow your healthcare professional's instructions.

## 2020-03-10 NOTE — PROGRESS NOTES
"Subjective:       Patient ID: Wai Bain is a 50 y.o. male.    Chief Complaint: Abdominal Pain    51 yo M complains of RUQ pain.  He speaks limited English, but his partner assists with translation.  The pain has been present since 11/2019 and is constant.  It varies in intensity from mild yet constant, to severe after eating.  The pain is described as "burning" and he states that sometimes it is uncomfortable for his shirt to be touching this area, but there is no rash.  He has h/o constipation for which he drinks prune juice every morning.  Without that, he could go 3-4 days without a BM, but with the prune juice he has a good BM every day.  This pain was much more severe prior to drinking the prune juice.  He was given Miralax but does not take it.  He was also given Reglan which he does take, but this has not changed symptoms.  He feels full easily when he eats and gets very bloated, but never vomits.  He denies rectal bleeding or FH of colon cancer.    Review of Systems   Constitutional: Negative for fever and unexpected weight change.   Eyes: Negative for photophobia and visual disturbance.   Respiratory: Negative for chest tightness, shortness of breath and wheezing.    Cardiovascular: Negative for chest pain, palpitations and leg swelling.   Genitourinary: Negative for dysuria, flank pain and hematuria.   Musculoskeletal: Negative for joint swelling and myalgias.   Skin: Negative for color change and rash.   Neurological: Negative for dizziness and speech difficulty.   Psychiatric/Behavioral: Negative for confusion and hallucinations.       Objective:       /78 (BP Location: Left arm, Patient Position: Sitting)   Pulse 80   Wt 97.5 kg (215 lb)   BMI 33.67 kg/m²     Physical Exam   Constitutional: He is oriented to person, place, and time. He appears well-developed and well-nourished.   HENT:   Head: Normocephalic and atraumatic.   Eyes: Pupils are equal, round, and reactive to light. EOM " are normal.   Neck: Normal range of motion. Neck supple.   Cardiovascular: Normal rate, regular rhythm, normal heart sounds and intact distal pulses.   Pulmonary/Chest: Effort normal and breath sounds normal.   Abdominal: Soft. Bowel sounds are normal. He exhibits no distension and no mass. There is no tenderness. There is no rebound and no guarding.   No rash noted, no TTP in RUQ   Musculoskeletal: He exhibits no edema or deformity.   Neurological: He is alert and oriented to person, place, and time.   Skin: Skin is warm and dry.   Psychiatric: He has a normal mood and affect. His behavior is normal.       Lab Results   Component Value Date    WBC 8.95 01/17/2020    HGB 14.4 01/17/2020    HCT 41.7 01/17/2020    MCV 79 (L) 01/17/2020     01/17/2020     Lumbar xary was independently visualized and reviewed by me and showed large amount of stool in the colon.    Assessment:       1. RUQ abdominal pain    2. Early satiety        Plan:       RUQ abdominal pain; Early satiety  -     Case request GI: EGD (ESOPHAGOGASTRODUODENOSCOPY)  -     US Abdomen Complete; Future; Expected date: 03/10/2020  -     pantoprazole (PROTONIX) 40 MG tablet; Take 1 tablet (40 mg total) by mouth once daily.  Dispense: 90 tablet; Refill: 3  -     Stop Reglan    Will plan for colonoscopy after workup complete.

## 2020-03-10 NOTE — LETTER
March 10, 2020      Rosario Maki, NADIYA  [address] [facility]  [address]  [place] [address]           [facility]  [address], [address]   [facility] [address]  Phone: [phone]  Fax: [phone]          Patient: Wai Bain   MR Number: [id number]   YOB: 1970   Date of Visit: 3/10/2020       Dear Rosario Maki:    Thank you for referring Wai Bain to me for evaluation. Attached you will find relevant portions of my assessment and plan of care.    If you have questions, please do not hesitate to call me. I look forward to following Wai Bain along with you.    Sincerely,    Katiuska العراقي MD    Enclosure  CC:  No Recipients    If you would like to receive this communication electronically, please contact [email or web address] or [phone] to request more information on [employer] Link access.    For providers and/or their staff who would like to refer a patient to [facility], please contact us through our one-stop-shop provider referral line, [facility], at [phone].    If you feel you have received this communication in error or would no longer like to receive these types of communications, please e-mail [email or web address]

## 2020-03-13 PROBLEM — R10.11 RUQ PAIN: Status: ACTIVE | Noted: 2020-03-13

## 2020-03-16 DIAGNOSIS — R10.11 RUQ PAIN: Primary | ICD-10-CM

## 2020-03-24 ENCOUNTER — TELEPHONE (OUTPATIENT)
Dept: GASTROENTEROLOGY | Facility: CLINIC | Age: 50
End: 2020-03-24

## 2020-03-24 DIAGNOSIS — K29.70 HELICOBACTER PYLORI GASTRITIS: Primary | ICD-10-CM

## 2020-03-24 DIAGNOSIS — B96.81 HELICOBACTER PYLORI GASTRITIS: Primary | ICD-10-CM

## 2020-03-24 RX ORDER — CLARITHROMYCIN 500 MG/1
500 TABLET, FILM COATED ORAL 2 TIMES DAILY
Qty: 28 TABLET | Refills: 0 | Status: SHIPPED | OUTPATIENT
Start: 2020-03-24 | End: 2020-04-21

## 2020-03-24 RX ORDER — BISMUTH SUBSALICYLATE 262 MG/1
2 TABLET ORAL
Qty: 120 TABLET | Refills: 0 | Status: SHIPPED | OUTPATIENT
Start: 2020-03-24 | End: 2020-04-22

## 2020-03-24 RX ORDER — AMOXICILLIN 500 MG/1
CAPSULE ORAL
Qty: 56 CAPSULE | Refills: 0 | Status: SHIPPED | OUTPATIENT
Start: 2020-03-24 | End: 2020-06-19 | Stop reason: ALTCHOICE

## 2020-03-24 NOTE — TELEPHONE ENCOUNTER
Stomach biopsies HP (+).  Meds sent to his pharmacy.  Be sure to take all meds together and complete all 14 days of treatment.  He will need to take the PPI bid while on tx, then can decrease back to once daily afterward.    This regimen is difficult, warn him.  In one month, if he does not feel better, he will need to come back to clinic for further workup.

## 2020-03-25 NOTE — TELEPHONE ENCOUNTER
----- Message from Jorge Chamorro sent at 3/25/2020 10:51 AM CDT -----  Contact: [phone] / self   Patient is returning a call from your office. Please Advise.

## 2020-04-05 ENCOUNTER — TELEPHONE (OUTPATIENT)
Dept: GASTROENTEROLOGY | Facility: CLINIC | Age: 50
End: 2020-04-05

## 2020-04-05 NOTE — TELEPHONE ENCOUNTER
----- Message from Katiuska العراقي MD sent at 3/16/2020 11:40 AM CDT -----  Stones and sludge in the gallbladder.  I am not sure if the gallbladder is his issue, but I did not see anything significant at EGD.  I am sending referral to gen surgery, please let him know.

## 2020-04-16 DIAGNOSIS — E11.65 TYPE 2 DIABETES MELLITUS WITH HYPERGLYCEMIA, WITHOUT LONG-TERM CURRENT USE OF INSULIN: ICD-10-CM

## 2020-04-16 RX ORDER — METFORMIN HYDROCHLORIDE 1000 MG/1
1000 TABLET ORAL 2 TIMES DAILY WITH MEALS
Qty: 180 TABLET | Refills: 0 | Status: CANCELLED | OUTPATIENT
Start: 2020-04-16 | End: 2020-07-15

## 2020-04-20 DIAGNOSIS — E11.65 TYPE 2 DIABETES MELLITUS WITH HYPERGLYCEMIA, WITHOUT LONG-TERM CURRENT USE OF INSULIN: ICD-10-CM

## 2020-04-20 RX ORDER — METFORMIN HYDROCHLORIDE 1000 MG/1
1000 TABLET ORAL 2 TIMES DAILY WITH MEALS
Qty: 180 TABLET | Refills: 0 | Status: SHIPPED | OUTPATIENT
Start: 2020-04-20 | End: 2023-04-03

## 2020-04-20 NOTE — TELEPHONE ENCOUNTER
----- Message from Zulma Stanton sent at 4/20/2020  1:25 PM CDT -----  Contact: Sudhakar [phone]  Martine patient would like a refill for Metformin 1000mg. Please send to [facility].

## 2020-05-18 ENCOUNTER — TELEPHONE (OUTPATIENT)
Dept: GASTROENTEROLOGY | Facility: CLINIC | Age: 50
End: 2020-05-18

## 2020-05-18 NOTE — TELEPHONE ENCOUNTER
----- Message from Jessica Lin sent at 5/18/2020  8:41 AM CDT -----  Contact: [phone]/self   Type:  Sooner Apoointment Request    Caller is requesting a sooner appointment.  Caller declined first available appointment listed below.  Name of Caller: wife  When is the first available appointment? 06/04  Symptoms: Still having stomach pains  Would the patient rather a call back or a response via [employer]?  call  Best Call Back Number: [phone]/self   Additional Information:

## 2020-05-18 NOTE — TELEPHONE ENCOUNTER
[place] Told patient that Dr. العراقي wants him to have an appointment with a general surgeon to check his gallbladder. Appointment scheduled with Dr. Conner.

## 2020-06-01 ENCOUNTER — OFFICE VISIT (OUTPATIENT)
Dept: SURGERY | Facility: CLINIC | Age: 50
End: 2020-06-01
Payer: MEDICAID

## 2020-06-01 VITALS
HEART RATE: 76 BPM | RESPIRATION RATE: 18 BRPM | WEIGHT: 221.25 LBS | HEIGHT: 67 IN | DIASTOLIC BLOOD PRESSURE: 84 MMHG | BODY MASS INDEX: 34.73 KG/M2 | SYSTOLIC BLOOD PRESSURE: 150 MMHG | OXYGEN SATURATION: 99 %

## 2020-06-01 DIAGNOSIS — K80.20 CALCULUS OF GALLBLADDER WITHOUT CHOLECYSTITIS WITHOUT OBSTRUCTION: Primary | ICD-10-CM

## 2020-06-01 PROCEDURE — 99213 OFFICE O/P EST LOW 20 MIN: CPT | Mod: PBBFAC,PN | Performed by: SURGERY

## 2020-06-01 PROCEDURE — 99999 PR PBB SHADOW E&M-EST. PATIENT-LVL III: CPT | Mod: PBBFAC,,, | Performed by: SURGERY

## 2020-06-01 PROCEDURE — 99204 PR OFFICE/OUTPT VISIT, NEW, LEVL IV, 45-59 MIN: ICD-10-PCS | Mod: S$PBB,,, | Performed by: SURGERY

## 2020-06-01 PROCEDURE — 99204 OFFICE O/P NEW MOD 45 MIN: CPT | Mod: S$PBB,,, | Performed by: SURGERY

## 2020-06-01 PROCEDURE — 99999 PR PBB SHADOW E&M-EST. PATIENT-LVL III: ICD-10-PCS | Mod: PBBFAC,,, | Performed by: SURGERY

## 2020-06-01 NOTE — PROGRESS NOTES
[facility]  OUTPATIENT H&P    REASON FOR VISIT/CC: RUQ pain    HPI: Wai Bain is a 50 y.o. male with right upper quadrant and epigastric pain for months.  Patient also endorses bloating and a burning sensation.  He tends to have worsening of his symptoms when he overeats.  He had an EGD that showed some mild gastritis and was treated for H pylori infection.  Despite the treatment as well as PPI, he has had persistent symptoms.  An abdominal ultrasound was performed that showed gallbladder sludge and stones.  His liver function tests were within normal limits.  He denies nausea vomiting or diarrhea.  He does endorse some sensitivity of the skin of the right upper quadrant but there is no signs of rash or shingles.  He has otherwise been in his baseline state of health.    I have reviewed the patient's chart including prior progress notes, procedures and testing.     ROS:   Review of Systems   Constitutional: Negative for activity change, chills, fatigue, fever and unexpected weight change.   HENT: Negative for facial swelling and trouble swallowing.    Eyes: Negative for visual disturbance.   Respiratory: Negative for cough, chest tightness, shortness of breath and wheezing.    Cardiovascular: Negative for chest pain, palpitations and leg swelling.   Gastrointestinal: Positive for abdominal distention and abdominal pain. Negative for anal bleeding, blood in stool, constipation, diarrhea, nausea and vomiting.   Endocrine: Negative for polydipsia and polyuria.   Genitourinary: Negative for dysuria and hematuria.   Musculoskeletal: Negative for arthralgias and myalgias.   Skin: Negative for color change, pallor and wound.   Allergic/Immunologic: Negative.    Neurological: Negative for syncope and weakness.   Hematological: Negative for adenopathy.   Psychiatric/Behavioral: Negative for agitation, behavioral problems and dysphoric mood.       PROBLEM LIST:  Patient Active Problem List   Diagnosis     Type 2 diabetes mellitus with hyperglycemia, without long-term current use of insulin    Essential hypertension    RUQ abdominal pain    Right flank pain    Microscopic hematuria    Early satiety    RUQ pain         HISTORY  Past Medical History:   Diagnosis Date    Diabetes mellitus     Hypertension     Urinary tract infection        Past Surgical History:   Procedure Laterality Date    ESOPHAGOGASTRODUODENOSCOPY N/A 3/13/2020    Procedure: EGD (ESOPHAGOGASTRODUODENOSCOPY);  Surgeon: Katiuska العراقي MD;  Location: [facility];  Service: Endoscopy;  Laterality: N/A;       Social History     Tobacco Use    Smoking status: Former Smoker     Types: Cigarettes    Smokeless tobacco: Never Used   Substance Use Topics    Alcohol use: Yes     Alcohol/week: 1.0 standard drinks     Types: 1 Cans of beer per week     Comment: social    Drug use: Never       Family History   Problem Relation Age of Onset    Prostate cancer Neg Hx     Kidney disease Neg Hx          MEDS:  Current Outpatient Medications on File Prior to Visit   Medication Sig Dispense Refill    blood sugar diagnostic Strp USE TO TEST BLOOD SUGAR ONCE DAILY 100 strip 11    blood-glucose meter kit USE AS DIRECTED 1 each 0    glyBURIDE (DIABETA) 2.5 MG tablet Take 1 tablet (2.5 mg total) by mouth daily with breakfast. 90 tablet 3    lancets 33 gauge Misc Test blood sugar once daily 100 each 11    lisinopriL (PRINIVIL,ZESTRIL) 20 MG tablet Take 1 tablet (20 mg total) by mouth once daily. 90 tablet 3    metFORMIN (GLUCOPHAGE) 1000 MG tablet Take 1 tablet (1,000 mg total) by mouth 2 (two) times daily with meals. 180 tablet 0    pantoprazole (PROTONIX) 40 MG tablet Take 1 tablet (40 mg total) by mouth once daily. 90 tablet 3    polyethylene glycol (GLYCOLAX) 17 gram/dose powder mix and Take 17 g by mouth daily as needed (for constipation). 510 g 1    amoxicillin (AMOXIL) 500 MG capsule take 2 capsules by mouth 2 times per day for 14 days  (Patient not taking: Reported on 6/1/2020) 56 capsule 0     No current facility-administered medications on file prior to visit.        ALLERGIES:  Review of patient's allergies indicates:  No Known Allergies      VITALS:  Vitals:    06/01/20 0957   BP: (!) 150/84   Pulse: 76   Resp: 18         PHYSICAL EXAM:  Physical Exam   Constitutional: He is oriented to person, place, and time. He appears well-developed and well-nourished. No distress.   HENT:   Head: Normocephalic and atraumatic.   Eyes: Pupils are equal, round, and reactive to light. Conjunctivae are normal. No scleral icterus.   Neck: Neck supple.   Cardiovascular: Regular rhythm.   Pulmonary/Chest: Effort normal.   Abdominal: Soft. He exhibits no distension and no mass. There is no tenderness. There is no rebound and no guarding. No hernia.   Musculoskeletal: Normal range of motion. He exhibits no edema.   Neurological: He is alert and oriented to person, place, and time.   Skin: Skin is warm and dry. No rash noted.   Psychiatric: He has a normal mood and affect. His behavior is normal.         LABS:  Lab Results   Component Value Date    WBC 8.95 01/17/2020    RBC 5.27 01/17/2020    HGB 14.4 01/17/2020    HCT 41.7 01/17/2020     01/17/2020     Lab Results   Component Value Date     (H) 01/17/2020     01/17/2020    K 4.4 01/17/2020     01/17/2020    CO2 25 01/17/2020    BUN 16 01/17/2020    CREATININE 0.78 01/17/2020    CALCIUM 9.9 01/17/2020     Lab Results   Component Value Date    ALT 34 01/17/2020    AST 28 01/17/2020    ALKPHOS 64 01/17/2020    BILITOT 0.6 01/17/2020     No results found for: MG, PHOS    STUDIES:  Abdominal CT scan and ultrasound images and reports were personally reviewed.        ASSESSMENT & PLAN:  50 y.o. male with chronic right upper quadrant pain as well as cholelithiasis.  I do think that the gallstones and sludge are contributing to his symptoms and recommended laparoscopic cholecystectomy.  The  patient at this time would like to try dietary changes before committing to surgery.  I recommend a low-fat diet for the next 4-6 weeks.  If he is still having issues, he will call us back to schedule his surgery.      Dayron Conner M.D., F.A.C.S.  [facility] and General Surgery  [facility]

## 2020-06-01 NOTE — PATIENT INSTRUCTIONS
Low fat diet for 4-6 weeks.  If this does not improve your symptoms, call us to schedule removal of your gallbladder.

## 2020-06-01 NOTE — LETTER
June 1, 2020      Katiuska العراقي MD  [address]  [address]   [facility] [address]           [facility]  [address]  [facility] [address]  Phone: [phone]  Fax: [phone]          Patient: Wai Bain   MR Number: [id number]   YOB: 1970   Date of Visit: 6/1/2020       Dear Dr. Katiuska العراقي:    Thank you for referring Wai Bain to me for evaluation. Attached you will find relevant portions of my assessment and plan of care.    If you have questions, please do not hesitate to call me. I look forward to following Wai Bain along with you.    Sincerely,    Dayron Conner MD    Enclosure  CC:  No Recipients    If you would like to receive this communication electronically, please contact [email or web address] or [phone] to request more information on [facility] Link access.    For providers and/or their staff who would like to refer a patient to [facility], please contact us through our one-stop-shop provider referral line, [facility], at [phone].    If you feel you have received this communication in error or would no longer like to receive these types of communications, please e-mail [email or web address]

## 2020-06-11 ENCOUNTER — TELEPHONE (OUTPATIENT)
Dept: SURGERY | Facility: HOSPITAL | Age: 50
End: 2020-06-11

## 2020-06-11 DIAGNOSIS — Z01.818 PREOP EXAMINATION: Primary | ICD-10-CM

## 2020-06-11 DIAGNOSIS — R10.11 RUQ PAIN: Primary | ICD-10-CM

## 2020-06-11 DIAGNOSIS — K80.20 CHOLELITHIASIS: ICD-10-CM

## 2020-06-11 RX ORDER — SODIUM CHLORIDE 9 MG/ML
INJECTION, SOLUTION INTRAVENOUS CONTINUOUS
Status: CANCELLED | OUTPATIENT
Start: 2020-06-11

## 2020-06-11 NOTE — TELEPHONE ENCOUNTER
I spoke with the patient's wife.  He would like to move forward with scheduling his laparoscopic cholecystectomy.  They would like to have it done the week of the June 22nd through the 26.

## 2020-06-20 ENCOUNTER — LAB VISIT (OUTPATIENT)
Dept: URGENT CARE | Facility: CLINIC | Age: 50
End: 2020-06-20
Payer: MEDICAID

## 2020-06-20 VITALS — TEMPERATURE: 98 F | HEART RATE: 84 BPM | OXYGEN SATURATION: 99 %

## 2020-06-20 DIAGNOSIS — Z01.818 PREOP EXAMINATION: ICD-10-CM

## 2020-06-20 PROCEDURE — U0003 INFECTIOUS AGENT DETECTION BY NUCLEIC ACID (DNA OR RNA); SEVERE ACUTE RESPIRATORY SYNDROME CORONAVIRUS 2 (SARS-COV-2) (CORONAVIRUS DISEASE [COVID-19]), AMPLIFIED PROBE TECHNIQUE, MAKING USE OF HIGH THROUGHPUT TECHNOLOGIES AS DESCRIBED BY CMS-2020-01-R: HCPCS

## 2020-06-22 PROBLEM — K80.20 CHOLELITHIASIS: Status: ACTIVE | Noted: 2020-06-22

## 2020-06-22 LAB — SARS-COV-2 RNA RESP QL NAA+PROBE: NOT DETECTED

## 2020-07-05 ENCOUNTER — NURSE TRIAGE (OUTPATIENT)
Dept: ADMINISTRATIVE | Facility: CLINIC | Age: 50
End: 2020-07-05

## 2020-07-05 NOTE — TELEPHONE ENCOUNTER
Contacted pt on behalf of Post Procedural Symptom Tracker. Pt verified by first and last name and . Pt denies any fever, cough, or difficulty breathing since procedure. Advised pt if these symptoms do arise to contact [facility] or PCP. No follow up needed.done through  [facility]andon [id number].      Reason for Disposition   General information question, no triage required and triager able to answer question    Protocols used: INFORMATION ONLY CALL - NO TRIAGE-A-

## 2020-07-13 ENCOUNTER — OFFICE VISIT (OUTPATIENT)
Dept: SURGERY | Facility: CLINIC | Age: 50
End: 2020-07-13
Payer: MEDICAID

## 2020-07-13 VITALS
HEIGHT: 67 IN | RESPIRATION RATE: 18 BRPM | BODY MASS INDEX: 34.34 KG/M2 | SYSTOLIC BLOOD PRESSURE: 124 MMHG | DIASTOLIC BLOOD PRESSURE: 81 MMHG | TEMPERATURE: 98 F | OXYGEN SATURATION: 98 % | HEART RATE: 79 BPM | WEIGHT: 218.81 LBS

## 2020-07-13 DIAGNOSIS — K80.20 CALCULUS OF GALLBLADDER WITHOUT CHOLECYSTITIS WITHOUT OBSTRUCTION: Primary | ICD-10-CM

## 2020-07-13 PROCEDURE — 99024 PR POST-OP FOLLOW-UP VISIT: ICD-10-PCS | Mod: ,,, | Performed by: SURGERY

## 2020-07-13 PROCEDURE — 99213 OFFICE O/P EST LOW 20 MIN: CPT | Mod: PBBFAC,PN | Performed by: SURGERY

## 2020-07-13 PROCEDURE — 99999 PR PBB SHADOW E&M-EST. PATIENT-LVL III: CPT | Mod: PBBFAC,,, | Performed by: SURGERY

## 2020-07-13 PROCEDURE — 99024 POSTOP FOLLOW-UP VISIT: CPT | Mod: ,,, | Performed by: SURGERY

## 2020-07-13 PROCEDURE — 99999 PR PBB SHADOW E&M-EST. PATIENT-LVL III: ICD-10-PCS | Mod: PBBFAC,,, | Performed by: SURGERY

## 2020-07-13 NOTE — PROGRESS NOTES
[facility]  POST-OP NOTE    HPI: Wai Bain is a 50 y.o. male s/p lap tank.  Doing well.  Some minor aches and pains.      VITALS:  Vitals:    07/13/20 0945   BP: 124/81   Pulse: 79   Resp: 18   Temp: 98 °F (36.7 °C)       PHYSICAL EXAM:  GEN: NAD  HEENT: NCAT  ABD: incisions C/D/I    PATHOLOGY:  Reviewed      ASSESSMENT & PLAN:  Doing well.  RTC as needed.  May take NSAIDS and or Tylenol for minor aches.      Dayron Conner M.D., F.A.C.S.  [facility] and General Surgery  [facility]

## 2020-10-23 DIAGNOSIS — E11.65 TYPE 2 DIABETES MELLITUS WITH HYPERGLYCEMIA, WITHOUT LONG-TERM CURRENT USE OF INSULIN: ICD-10-CM

## 2020-10-23 RX ORDER — METFORMIN HYDROCHLORIDE 1000 MG/1
1000 TABLET ORAL 2 TIMES DAILY WITH MEALS
Qty: 180 TABLET | Refills: 0 | Status: CANCELLED | OUTPATIENT
Start: 2020-10-23 | End: 2021-01-21

## 2022-12-19 ENCOUNTER — OFFICE VISIT (OUTPATIENT)
Dept: OPTOMETRY | Facility: CLINIC | Age: 52
End: 2022-12-19
Payer: MEDICAID

## 2022-12-19 DIAGNOSIS — H52.03 HYPEROPIA OF BOTH EYES WITH ASTIGMATISM AND PRESBYOPIA: ICD-10-CM

## 2022-12-19 DIAGNOSIS — H52.203 HYPEROPIA OF BOTH EYES WITH ASTIGMATISM AND PRESBYOPIA: ICD-10-CM

## 2022-12-19 DIAGNOSIS — H35.033 HYPERTENSIVE RETINOPATHY OF BOTH EYES, GRADE 3: ICD-10-CM

## 2022-12-19 DIAGNOSIS — E11.65 TYPE 2 DIABETES MELLITUS WITH HYPERGLYCEMIA, WITHOUT LONG-TERM CURRENT USE OF INSULIN: ICD-10-CM

## 2022-12-19 DIAGNOSIS — E11.3313 MODERATE NONPROLIFERATIVE DIABETIC RETINOPATHY OF BOTH EYES WITH MACULAR EDEMA ASSOCIATED WITH TYPE 2 DIABETES MELLITUS: Primary | ICD-10-CM

## 2022-12-19 DIAGNOSIS — H52.4 HYPEROPIA OF BOTH EYES WITH ASTIGMATISM AND PRESBYOPIA: ICD-10-CM

## 2022-12-19 DIAGNOSIS — H53.8 BLURRED VISION, BILATERAL: ICD-10-CM

## 2022-12-19 PROCEDURE — 99999 PR PBB SHADOW E&M-EST. PATIENT-LVL II: CPT | Mod: PBBFAC,,, | Performed by: OPTOMETRIST

## 2022-12-19 PROCEDURE — 4010F ACE/ARB THERAPY RXD/TAKEN: CPT | Mod: CPTII,,, | Performed by: OPTOMETRIST

## 2022-12-19 PROCEDURE — 4010F PR ACE/ARB THEARPY RXD/TAKEN: ICD-10-PCS | Mod: CPTII,,, | Performed by: OPTOMETRIST

## 2022-12-19 PROCEDURE — 1159F PR MEDICATION LIST DOCUMENTED IN MEDICAL RECORD: ICD-10-PCS | Mod: CPTII,,, | Performed by: OPTOMETRIST

## 2022-12-19 PROCEDURE — 1159F MED LIST DOCD IN RCRD: CPT | Mod: CPTII,,, | Performed by: OPTOMETRIST

## 2022-12-19 PROCEDURE — 99999 PR PBB SHADOW E&M-EST. PATIENT-LVL II: ICD-10-PCS | Mod: PBBFAC,,, | Performed by: OPTOMETRIST

## 2022-12-19 PROCEDURE — 2022F PR DILATED RETINAL EYE EXAM WITH INTERP/REVIEW: ICD-10-PCS | Mod: CPTII,,, | Performed by: OPTOMETRIST

## 2022-12-19 PROCEDURE — 92015 DETERMINE REFRACTIVE STATE: CPT | Mod: ,,, | Performed by: OPTOMETRIST

## 2022-12-19 PROCEDURE — 92015 PR REFRACTION: ICD-10-PCS | Mod: ,,, | Performed by: OPTOMETRIST

## 2022-12-19 PROCEDURE — 92004 COMPRE OPH EXAM NEW PT 1/>: CPT | Mod: S$PBB,,, | Performed by: OPTOMETRIST

## 2022-12-19 PROCEDURE — 2022F DILAT RTA XM EVC RTNOPTHY: CPT | Mod: CPTII,,, | Performed by: OPTOMETRIST

## 2022-12-19 PROCEDURE — 99212 OFFICE O/P EST SF 10 MIN: CPT | Mod: PBBFAC,PN | Performed by: OPTOMETRIST

## 2022-12-19 PROCEDURE — 92004 PR EYE EXAM, NEW PATIENT,COMPREHESV: ICD-10-PCS | Mod: S$PBB,,, | Performed by: OPTOMETRIST

## 2022-12-19 RX ORDER — GLIMEPIRIDE 2 MG/1
TABLET ORAL
COMMUNITY
Start: 2022-07-19 | End: 2023-10-20

## 2023-01-05 ENCOUNTER — OFFICE VISIT (OUTPATIENT)
Dept: OPHTHALMOLOGY | Facility: CLINIC | Age: 53
End: 2023-01-05
Payer: MEDICAID

## 2023-01-05 DIAGNOSIS — H35.033 HYPERTENSIVE RETINOPATHY, BILATERAL: ICD-10-CM

## 2023-01-05 DIAGNOSIS — E11.3513 TYPE 2 DIABETES MELLITUS WITH BOTH EYES AFFECTED BY PROLIFERATIVE RETINOPATHY AND MACULAR EDEMA, WITHOUT LONG-TERM CURRENT USE OF INSULIN: Primary | ICD-10-CM

## 2023-01-05 DIAGNOSIS — H25.13 NS (NUCLEAR SCLEROSIS), BILATERAL: ICD-10-CM

## 2023-01-05 PROCEDURE — 99999 PR PBB SHADOW E&M-EST. PATIENT-LVL III: CPT | Mod: PBBFAC,,, | Performed by: OPHTHALMOLOGY

## 2023-01-05 PROCEDURE — 67028 INJECTION EYE DRUG: CPT | Mod: 50,S$PBB,, | Performed by: OPHTHALMOLOGY

## 2023-01-05 PROCEDURE — 1160F PR REVIEW ALL MEDS BY PRESCRIBER/CLIN PHARMACIST DOCUMENTED: ICD-10-PCS | Mod: CPTII,,, | Performed by: OPHTHALMOLOGY

## 2023-01-05 PROCEDURE — 67028 INJECTION EYE DRUG: CPT | Mod: 50,PBBFAC,PO | Performed by: OPHTHALMOLOGY

## 2023-01-05 PROCEDURE — 1159F MED LIST DOCD IN RCRD: CPT | Mod: CPTII,,, | Performed by: OPHTHALMOLOGY

## 2023-01-05 PROCEDURE — 67028 PR INJECT INTRAVITREAL PHARMCOLOGIC: ICD-10-PCS | Mod: 50,S$PBB,, | Performed by: OPHTHALMOLOGY

## 2023-01-05 PROCEDURE — 92004 PR EYE EXAM, NEW PATIENT,COMPREHESV: ICD-10-PCS | Mod: 25,S$PBB,, | Performed by: OPHTHALMOLOGY

## 2023-01-05 PROCEDURE — 92004 COMPRE OPH EXAM NEW PT 1/>: CPT | Mod: 25,S$PBB,, | Performed by: OPHTHALMOLOGY

## 2023-01-05 PROCEDURE — 1160F RVW MEDS BY RX/DR IN RCRD: CPT | Mod: CPTII,,, | Performed by: OPHTHALMOLOGY

## 2023-01-05 PROCEDURE — 92134 CPTRZ OPH DX IMG PST SGM RTA: CPT | Mod: PBBFAC,PO | Performed by: OPHTHALMOLOGY

## 2023-01-05 PROCEDURE — 99999 PR PBB SHADOW E&M-EST. PATIENT-LVL III: ICD-10-PCS | Mod: PBBFAC,,, | Performed by: OPHTHALMOLOGY

## 2023-01-05 PROCEDURE — 1159F PR MEDICATION LIST DOCUMENTED IN MEDICAL RECORD: ICD-10-PCS | Mod: CPTII,,, | Performed by: OPHTHALMOLOGY

## 2023-01-05 PROCEDURE — 99213 OFFICE O/P EST LOW 20 MIN: CPT | Mod: PBBFAC,PO,25 | Performed by: OPHTHALMOLOGY

## 2023-01-05 PROCEDURE — 92134 POSTERIOR SEGMENT OCT RETINA (OCULAR COHERENCE TOMOGRAPHY)-BOTH EYES: ICD-10-PCS | Mod: 26,S$PBB,, | Performed by: OPHTHALMOLOGY

## 2023-01-05 RX ORDER — FLUORESCEIN 500 MG/ML
5 INJECTION INTRAVENOUS ONCE
Status: COMPLETED | OUTPATIENT
Start: 2023-01-05 | End: 2023-02-09

## 2023-01-05 RX ADMIN — Medication 1.25 MG: at 11:01

## 2023-01-05 NOTE — PROGRESS NOTES
HPI    New pt ref by Dr. Sotomayor    DM retinopathy    Patient here today with c/o blurry VA ou with black spot in VA OD, no pain   or f/f.  Last edited by Lynne Peralta on 1/5/2023  9:39 AM.          OCT - Complex DME OU        A/P    Likely PDR OU  T2 uncontrolled without insulin    Check FU FA    2. Complex DME OU    Avastin OU today    Get approval for Ozurdex    3. HTN Ret OU  BS/BP/chol control    4. Early NS OU      1 month OCT/FA OD      Risks, benefits, and alternatives to treatment discussed in detail with the patient.  The patient voiced understanding and wished to proceed with the procedure    Injection Procedure Note:  Diagnosis: DME OU    Patient Identified and Time Out complete  Pt marked  Topical Proparacaine and Betadine.  Inject Avastin OU at 6:00 @ 3.5-4mm posterior to limbus  Post Operative Dx: Same  Complications: None  Follow up as above.

## 2023-02-09 ENCOUNTER — PROCEDURE VISIT (OUTPATIENT)
Dept: OPHTHALMOLOGY | Facility: CLINIC | Age: 53
End: 2023-02-09
Payer: MEDICAID

## 2023-02-09 DIAGNOSIS — H35.033 HYPERTENSIVE RETINOPATHY, BILATERAL: ICD-10-CM

## 2023-02-09 DIAGNOSIS — E11.3513 TYPE 2 DIABETES MELLITUS WITH BOTH EYES AFFECTED BY PROLIFERATIVE RETINOPATHY AND MACULAR EDEMA, WITHOUT LONG-TERM CURRENT USE OF INSULIN: Primary | ICD-10-CM

## 2023-02-09 DIAGNOSIS — H25.13 NS (NUCLEAR SCLEROSIS), BILATERAL: ICD-10-CM

## 2023-02-09 PROCEDURE — 92014 COMPRE OPH EXAM EST PT 1/>: CPT | Mod: 25,S$PBB,, | Performed by: OPHTHALMOLOGY

## 2023-02-09 PROCEDURE — 92235 FLUORESCEIN ANGIOGRAPHY - OU - BOTH EYES: ICD-10-PCS | Mod: 26,S$PBB,, | Performed by: OPHTHALMOLOGY

## 2023-02-09 PROCEDURE — 67028 INJECTION EYE DRUG: CPT | Mod: 50,S$PBB,, | Performed by: OPHTHALMOLOGY

## 2023-02-09 PROCEDURE — 92235 FLUORESCEIN ANGRPH MLTIFRAME: CPT | Mod: 50,PBBFAC,PO | Performed by: OPHTHALMOLOGY

## 2023-02-09 PROCEDURE — 92134 CPTRZ OPH DX IMG PST SGM RTA: CPT | Mod: PBBFAC,PO | Performed by: OPHTHALMOLOGY

## 2023-02-09 PROCEDURE — 92014 PR EYE EXAM, EST PATIENT,COMPREHESV: ICD-10-PCS | Mod: 25,S$PBB,, | Performed by: OPHTHALMOLOGY

## 2023-02-09 PROCEDURE — 92134 POSTERIOR SEGMENT OCT RETINA (OCULAR COHERENCE TOMOGRAPHY)-BOTH EYES: ICD-10-PCS | Mod: 26,S$PBB,, | Performed by: OPHTHALMOLOGY

## 2023-02-09 PROCEDURE — 67028 INJECTION EYE DRUG: CPT | Mod: 50,PBBFAC,PO | Performed by: OPHTHALMOLOGY

## 2023-02-09 PROCEDURE — 67028 PR INJECT INTRAVITREAL PHARMCOLOGIC: ICD-10-PCS | Mod: 50,S$PBB,, | Performed by: OPHTHALMOLOGY

## 2023-02-09 RX ADMIN — Medication 1.25 MG: at 10:02

## 2023-02-09 RX ADMIN — FLUORESCEIN 500 MG: 500 INJECTION INTRAVENOUS at 10:02

## 2023-02-09 NOTE — PROGRESS NOTES
HPI     Concerns About Ocular Health     Additional comments: OCT/ FA/ OD            Comments    53 Y/o male is here for OCT/ FA/OD Pt states vision feels a little better   since last visit   Pt denies pain and discomfort   Pt states he is seeing occasional floaters cannot distinguish which eye.    Eye exam: Visine OU BID           HPI    New pt ref by Dr. Sotomayor    DM retinopathy    Patient here today with c/o blurry VA ou with black spot in VA OD, no pain   or f/f.  Last edited by Lynne Peralta on 1/5/2023  9:39 AM.          OCT - Complex DME OU  Improving    WFFA - NV OU with late macular leakage OU        A/P    PDR OU  T2 uncontrolled without insulin    Will need PRP OU    2. Complex DME OU  S/p Avastin OU x 1    Avastin OU today    Approved for Ozurdex from medicaid, but not good days - working on out of pocket cost for patient    3. HTN Ret OU  BS/BP/chol control    4. Early NS OU      1 month Avastin OU only      Risks, benefits, and alternatives to treatment discussed in detail with the patient.  The patient voiced understanding and wished to proceed with the procedure    Injection Procedure Note:  Diagnosis: DME OU    Patient Identified and Time Out complete  Pt marked  Topical Proparacaine and Betadine.  Inject Avastin OU at 6:00 @ 3.5-4mm posterior to limbus  Post Operative Dx: Same  Complications: None  Follow up as above.

## 2023-02-23 NOTE — PROGRESS NOTES
After obtaining consent, and per orders of Dr. Alexis Nuno NP, injection of MMR given SQ and Havrix given IM in LVL, Prevnar given IM in RVL. Patient tolerated well. HPI    Presents today to establish diabetic eye care. Pt reports blurry vision   OD>OS. Black spot in center of vision OD for couple months. Pt denies eye   pain, flashes and floaters. Use Visine gtts--PRN  Eye screening 3mo ago revealed diabetic retinopathy OD    Hemoglobin A1C       Date                     Value               Ref Range             Status                06/22/2020               9.5 (H)             4.0 - 5.6 %           Final                   12/07/2019               13.4 (H)            4.0 - 5.6 %           Final                Last edited by Trinh Oviedo, OD on 12/19/2022 11:05 AM.        ROS    Positive for: Endocrine, Eyes  Negative for: Constitutional, Gastrointestinal, Neurological, Skin,   Genitourinary, Musculoskeletal, HENT, Cardiovascular, Respiratory,   Psychiatric, Allergic/Imm, Heme/Lymph  Last edited by Trinh Oviedo, OD on 12/19/2022 10:35 AM.        Assessment /Plan     For exam results, see Encounter Report.    Moderate nonproliferative diabetic retinopathy of both eyes with macular edema associated with type 2 diabetes mellitus    Hypertensive retinopathy of both eyes, grade 3    Hyperopia of both eyes with astigmatism and presbyopia    Type 2 diabetes mellitus with hyperglycemia, without long-term current use of insulin    Blurred vision, bilateral    1-2. Sxs of diabetic and hypertensive retinopathy today. Last A1c 9.5% (Jun 2022). Pt treats diabetes and htn with medication. Referred to retina for diabetic and hypertensive retinal findings OU. Pt thoroughly ed on findings and importance of blood sugar control, bp control, and regular f/u with PCP or endocrinologist.   3. Withheld srx today until retinal pathology subside.   RTC for annual LESLIE or sooner prn.

## 2023-03-09 ENCOUNTER — OFFICE VISIT (OUTPATIENT)
Dept: OPHTHALMOLOGY | Facility: CLINIC | Age: 53
End: 2023-03-09
Payer: MEDICAID

## 2023-03-09 DIAGNOSIS — E11.3513 TYPE 2 DIABETES MELLITUS WITH BOTH EYES AFFECTED BY PROLIFERATIVE RETINOPATHY AND MACULAR EDEMA, WITHOUT LONG-TERM CURRENT USE OF INSULIN: Primary | ICD-10-CM

## 2023-03-09 PROCEDURE — 67028 INJECTION EYE DRUG: CPT | Mod: 50,PBBFAC | Performed by: OPHTHALMOLOGY

## 2023-03-09 PROCEDURE — 67028 INJECTION EYE DRUG: CPT | Mod: 50,S$PBB,, | Performed by: OPHTHALMOLOGY

## 2023-03-09 PROCEDURE — 1160F RVW MEDS BY RX/DR IN RCRD: CPT | Mod: CPTII,,, | Performed by: OPHTHALMOLOGY

## 2023-03-09 PROCEDURE — 99499 NO LOS: ICD-10-PCS | Mod: S$PBB,,, | Performed by: OPHTHALMOLOGY

## 2023-03-09 PROCEDURE — 67028 PR INJECT INTRAVITREAL PHARMCOLOGIC: ICD-10-PCS | Mod: 50,S$PBB,, | Performed by: OPHTHALMOLOGY

## 2023-03-09 PROCEDURE — 99999 PR PBB SHADOW E&M-EST. PATIENT-LVL II: CPT | Mod: PBBFAC,,, | Performed by: OPHTHALMOLOGY

## 2023-03-09 PROCEDURE — 1160F PR REVIEW ALL MEDS BY PRESCRIBER/CLIN PHARMACIST DOCUMENTED: ICD-10-PCS | Mod: CPTII,,, | Performed by: OPHTHALMOLOGY

## 2023-03-09 PROCEDURE — 1159F PR MEDICATION LIST DOCUMENTED IN MEDICAL RECORD: ICD-10-PCS | Mod: CPTII,,, | Performed by: OPHTHALMOLOGY

## 2023-03-09 PROCEDURE — 99499 UNLISTED E&M SERVICE: CPT | Mod: S$PBB,,, | Performed by: OPHTHALMOLOGY

## 2023-03-09 PROCEDURE — 1159F MED LIST DOCD IN RCRD: CPT | Mod: CPTII,,, | Performed by: OPHTHALMOLOGY

## 2023-03-09 PROCEDURE — 99212 OFFICE O/P EST SF 10 MIN: CPT | Mod: PBBFAC,25 | Performed by: OPHTHALMOLOGY

## 2023-03-09 PROCEDURE — 99999 PR PBB SHADOW E&M-EST. PATIENT-LVL II: ICD-10-PCS | Mod: PBBFAC,,, | Performed by: OPHTHALMOLOGY

## 2023-03-09 RX ADMIN — Medication 1.25 MG: at 09:03

## 2023-03-09 NOTE — PROGRESS NOTES
HPI    Patient here today for 1 mo Avastin ou inject only. VA improved but still   some central distortion OD, no pain or f/f.  Last edited by Lynne Peralta on 3/9/2023  9:32 AM.            OCT - Complex DME OU  Improving    Prior WFFA - NV OU with late macular leakage OU        A/P    PDR OU  T2 uncontrolled without insulin    Will need PRP OU    2. Complex DME OU  S/p Avastin OU x 2    Avastin OU today    Approved for Ozurdex from medicaid, but not good days - working on out of pocket cost for patient    3. HTN Ret OU  BS/BP/chol control    4. Early NS OU      1 month OCT No dilate      Risks, benefits, and alternatives to treatment discussed in detail with the patient.  The patient voiced understanding and wished to proceed with the procedure    Injection Procedure Note:  Diagnosis: DME OU    Patient Identified and Time Out complete  Pt marked  Topical Proparacaine and Betadine.  Inject Avastin OU at 6:00 @ 3.5-4mm posterior to limbus  Post Operative Dx: Same  Complications: None  Follow up as above.

## 2023-06-02 ENCOUNTER — TELEPHONE (OUTPATIENT)
Dept: VASCULAR SURGERY | Facility: CLINIC | Age: 53
End: 2023-06-02

## 2023-06-02 DIAGNOSIS — Z01.818 PRE-OP EVALUATION: Primary | ICD-10-CM

## 2023-06-02 NOTE — TELEPHONE ENCOUNTER
Contacted pt's wife Jana to schedule appt from referral for HD access creation. Jana states pt doesn't have insurance coverage at this time and would like to call back to schedule when this is set up. Contact information for clinic given to Jana and notified her that if pt does not have insurance but needs to be seen he should contact [facility]. Jana verbalized understanding.

## 2023-10-03 ENCOUNTER — TELEPHONE (OUTPATIENT)
Dept: OPHTHALMOLOGY | Facility: CLINIC | Age: 53
End: 2023-10-03
Payer: MEDICAID

## 2023-10-05 ENCOUNTER — TELEPHONE (OUTPATIENT)
Dept: OPHTHALMOLOGY | Facility: CLINIC | Age: 53
End: 2023-10-05
Payer: MEDICAID

## 2023-10-05 NOTE — TELEPHONE ENCOUNTER
----- Message from Danielle Trevino sent at 10/5/2023  8:54 AM CDT -----  Regarding: APPT  Contact: [phone]  Patient is calling to reschedule missed procedure from may. Please contact pt

## 2023-10-21 ENCOUNTER — HOSPITAL ENCOUNTER (INPATIENT)
Facility: HOSPITAL | Age: 53
LOS: 4 days | Discharge: HOME | DRG: 683 | End: 2023-10-25
Attending: STUDENT IN AN ORGANIZED HEALTH CARE EDUCATION/TRAINING PROGRAM | Admitting: STUDENT IN AN ORGANIZED HEALTH CARE EDUCATION/TRAINING PROGRAM
Payer: MEDICAID

## 2023-10-21 DIAGNOSIS — I16.1 HYPERTENSIVE EMERGENCY: ICD-10-CM

## 2023-10-21 DIAGNOSIS — N18.5 CKD (CHRONIC KIDNEY DISEASE), STAGE V: Primary | ICD-10-CM

## 2023-10-21 DIAGNOSIS — D64.9 ANEMIA, UNSPECIFIED TYPE: ICD-10-CM

## 2023-10-21 DIAGNOSIS — R07.9 CHEST PAIN: ICD-10-CM

## 2023-10-21 DIAGNOSIS — R06.02 SHORTNESS OF BREATH: ICD-10-CM

## 2023-10-21 DIAGNOSIS — R93.89 ABNORMAL CT OF THE CHEST: ICD-10-CM

## 2023-10-21 DIAGNOSIS — R05.9 COUGH IN ADULT: ICD-10-CM

## 2023-10-21 PROBLEM — N18.9 ANEMIA DUE TO CHRONIC KIDNEY DISEASE: Status: ACTIVE | Noted: 2023-10-21

## 2023-10-21 PROBLEM — E66.01 SEVERE OBESITY (BMI >= 40): Status: ACTIVE | Noted: 2023-10-21

## 2023-10-21 PROBLEM — D63.1 ANEMIA DUE TO CHRONIC KIDNEY DISEASE: Status: ACTIVE | Noted: 2023-10-21

## 2023-10-21 LAB
ABO + RH BLD: NORMAL
ALBUMIN SERPL BCP-MCNC: 2.4 G/DL (ref 3.5–5.2)
ALP SERPL-CCNC: 54 U/L (ref 55–135)
ALT SERPL W/O P-5'-P-CCNC: 11 U/L (ref 10–44)
ANION GAP SERPL CALC-SCNC: 17 MMOL/L (ref 8–16)
AORTIC ROOT ANNULUS: 2.87 CM
AST SERPL-CCNC: 13 U/L (ref 10–40)
AV INDEX (PROSTH): 0.64
AV MEAN GRADIENT: 6 MMHG
AV PEAK GRADIENT: 12 MMHG
AV VALVE AREA BY VELOCITY RATIO: 1.93 CM²
AV VALVE AREA: 2.09 CM²
AV VELOCITY RATIO: 0.59
BILIRUB SERPL-MCNC: 0.4 MG/DL (ref 0.1–1)
BLD GP AB SCN CELLS X3 SERPL QL: NORMAL
BNP SERPL-MCNC: 552 PG/ML (ref 0–99)
BSA FOR ECHO PROCEDURE: 2.27 M2
BUN SERPL-MCNC: 92 MG/DL (ref 6–20)
CALCIUM SERPL-MCNC: 7.9 MG/DL (ref 8.7–10.5)
CHLORIDE SERPL-SCNC: 110 MMOL/L (ref 95–110)
CO2 SERPL-SCNC: 14 MMOL/L (ref 23–29)
CREAT SERPL-MCNC: 10.6 MG/DL (ref 0.5–1.4)
CV ECHO LV RWT: 0.51 CM
DOP CALC AO PEAK VEL: 1.73 M/S
DOP CALC AO VTI: 39.9 CM
DOP CALC LVOT AREA: 3.3 CM2
DOP CALC LVOT DIAMETER: 2.04 CM
DOP CALC LVOT PEAK VEL: 1.02 M/S
DOP CALC LVOT STROKE VOLUME: 83.3 CM3
DOP CALC MV VTI: 25.1 CM
DOP CALCLVOT PEAK VEL VTI: 25.5 CM
E WAVE DECELERATION TIME: 175.53 MSEC
E/A RATIO: 1.68
E/E' RATIO: 9.9 M/S
ECHO LV POSTERIOR WALL: 1.28 CM (ref 0.6–1.1)
ERYTHROCYTE [DISTWIDTH] IN BLOOD BY AUTOMATED COUNT: 13 % (ref 11.5–14.5)
ERYTHROCYTE [DISTWIDTH] IN BLOOD BY AUTOMATED COUNT: 13.1 % (ref 11.5–14.5)
EST. GFR  (NO RACE VARIABLE): 5 ML/MIN/1.73 M^2
ESTIMATED AVG GLUCOSE: 114 MG/DL (ref 68–131)
FERRITIN SERPL-MCNC: 433 NG/ML (ref 20–300)
FOLATE SERPL-MCNC: 4.5 NG/ML (ref 4–24)
FRACTIONAL SHORTENING: 39 % (ref 28–44)
GLUCOSE SERPL-MCNC: 162 MG/DL (ref 70–110)
HAV IGM SERPL QL IA: NORMAL
HBA1C MFR BLD: 5.6 % (ref 4–5.6)
HBV CORE IGM SERPL QL IA: NORMAL
HBV SURFACE AG SERPL QL IA: NORMAL
HCT VFR BLD AUTO: 21.3 % (ref 40–54)
HCT VFR BLD AUTO: 21.4 % (ref 40–54)
HCV AB SERPL QL IA: NORMAL
HGB BLD-MCNC: 6.9 G/DL (ref 14–18)
HGB BLD-MCNC: 6.9 G/DL (ref 14–18)
HIV 1+2 AB+HIV1 P24 AG SERPL QL IA: NORMAL
INTERVENTRICULAR SEPTUM: 1.36 CM (ref 0.6–1.1)
IRON SERPL-MCNC: 28 UG/DL (ref 45–160)
IVC DIAMETER: 1.52 CM
IVRT: 59.94 MSEC
LA MAJOR: 5.51 CM
LA MINOR: 5.73 CM
LA WIDTH: 3.2 CM
LEFT ATRIUM SIZE: 4.22 CM
LEFT ATRIUM VOLUME INDEX MOD: 26 ML/M2
LEFT ATRIUM VOLUME INDEX: 29.4 ML/M2
LEFT ATRIUM VOLUME MOD: 57.03 CM3
LEFT ATRIUM VOLUME: 64.48 CM3
LEFT INTERNAL DIMENSION IN SYSTOLE: 3.07 CM (ref 2.1–4)
LEFT VENTRICLE DIASTOLIC VOLUME INDEX: 54.41 ML/M2
LEFT VENTRICLE DIASTOLIC VOLUME: 119.15 ML
LEFT VENTRICLE MASS INDEX: 123 G/M2
LEFT VENTRICLE SYSTOLIC VOLUME INDEX: 16.9 ML/M2
LEFT VENTRICLE SYSTOLIC VOLUME: 37.09 ML
LEFT VENTRICULAR INTERNAL DIMENSION IN DIASTOLE: 5.02 CM (ref 3.5–6)
LEFT VENTRICULAR MASS: 269.29 G
LV LATERAL E/E' RATIO: 9.9 M/S
LV SEPTAL E/E' RATIO: 9.9 M/S
LVOT MG: 2.47 MMHG
LVOT MV: 0.74 CM/S
MAGNESIUM SERPL-MCNC: 1.8 MG/DL (ref 1.6–2.6)
MCH RBC QN AUTO: 26.7 PG (ref 27–31)
MCH RBC QN AUTO: 26.8 PG (ref 27–31)
MCHC RBC AUTO-ENTMCNC: 32.2 G/DL (ref 32–36)
MCHC RBC AUTO-ENTMCNC: 32.4 G/DL (ref 32–36)
MCV RBC AUTO: 83 FL (ref 82–98)
MCV RBC AUTO: 83 FL (ref 82–98)
MV A" WAVE DURATION": 148.43 MSEC
MV MEAN GRADIENT: 2 MMHG
MV PEAK A VEL: 0.59 M/S
MV PEAK E VEL: 0.99 M/S
MV PEAK GRADIENT: 4 MMHG
MV STENOSIS PRESSURE HALF TIME: 53.03 MS
MV VALVE AREA BY CONTINUITY EQUATION: 3.32 CM2
MV VALVE AREA P 1/2 METHOD: 4.15 CM2
OHS LV EJECTION FRACTION SIMPSONS BIPLANE MOD: 62 %
PHOSPHATE SERPL-MCNC: 8.4 MG/DL (ref 2.7–4.5)
PISA MRMAX VEL: 3.69 M/S
PISA TR MAX VEL: 2.12 M/S
PLATELET # BLD AUTO: 233 K/UL (ref 150–450)
PLATELET # BLD AUTO: 249 K/UL (ref 150–450)
PMV BLD AUTO: 8.5 FL (ref 9.2–12.9)
PMV BLD AUTO: 8.5 FL (ref 9.2–12.9)
POCT GLUCOSE: 125 MG/DL (ref 70–110)
POCT GLUCOSE: 131 MG/DL (ref 70–110)
POCT GLUCOSE: 138 MG/DL (ref 70–110)
POCT GLUCOSE: 164 MG/DL (ref 70–110)
POCT GLUCOSE: 179 MG/DL (ref 70–110)
POTASSIUM SERPL-SCNC: 4.9 MMOL/L (ref 3.5–5.1)
PROT SERPL-MCNC: 6.3 G/DL (ref 6–8.4)
PULM VEIN S/D RATIO: 0.68
PV PEAK D VEL: 0.73 M/S
PV PEAK S VEL: 0.5 M/S
RA MAJOR: 4.56 CM
RA PRESSURE ESTIMATED: 3 MMHG
RA WIDTH: 2.82 CM
RBC # BLD AUTO: 2.57 M/UL (ref 4.6–6.2)
RBC # BLD AUTO: 2.58 M/UL (ref 4.6–6.2)
RIGHT VENTRICULAR END-DIASTOLIC DIMENSION: 2.38 CM
RV TB RVSP: 5 MMHG
RV TISSUE DOPPLER FREE WALL SYSTOLIC VELOCITY 1 (APICAL 4 CHAMBER VIEW): 12.65 CM/S
SATURATED IRON: 11 % (ref 20–50)
SINUS: 3.14 CM
SODIUM SERPL-SCNC: 141 MMOL/L (ref 136–145)
SPECIMEN OUTDATE: NORMAL
TDI LATERAL: 0.1 M/S
TDI SEPTAL: 0.1 M/S
TDI: 0.1 M/S
TOTAL IRON BINDING CAPACITY: 247 UG/DL (ref 250–450)
TR MAX PG: 18 MMHG
TRANSFERRIN SERPL-MCNC: 167 MG/DL (ref 200–375)
TSH SERPL DL<=0.005 MIU/L-ACNC: 1.15 UIU/ML (ref 0.4–4)
TV REST PULMONARY ARTERY PRESSURE: 21 MMHG
VIT B12 SERPL-MCNC: 391 PG/ML (ref 210–950)
WBC # BLD AUTO: 11.27 K/UL (ref 3.9–12.7)
WBC # BLD AUTO: 11.3 K/UL (ref 3.9–12.7)
Z-SCORE OF LEFT VENTRICULAR DIMENSION IN END DIASTOLE: -3.88
Z-SCORE OF LEFT VENTRICULAR DIMENSION IN END SYSTOLE: -3.02

## 2023-10-21 PROCEDURE — 94761 N-INVAS EAR/PLS OXIMETRY MLT: CPT

## 2023-10-21 PROCEDURE — 93010 EKG 12-LEAD: ICD-10-PCS | Mod: ,,, | Performed by: INTERNAL MEDICINE

## 2023-10-21 PROCEDURE — 25000003 PHARM REV CODE 250: Performed by: STUDENT IN AN ORGANIZED HEALTH CARE EDUCATION/TRAINING PROGRAM

## 2023-10-21 PROCEDURE — 85027 COMPLETE CBC AUTOMATED: CPT | Performed by: INTERNAL MEDICINE

## 2023-10-21 PROCEDURE — 84443 ASSAY THYROID STIM HORMONE: CPT | Performed by: FAMILY MEDICINE

## 2023-10-21 PROCEDURE — 36415 COLL VENOUS BLD VENIPUNCTURE: CPT | Performed by: FAMILY MEDICINE

## 2023-10-21 PROCEDURE — 83540 ASSAY OF IRON: CPT | Performed by: FAMILY MEDICINE

## 2023-10-21 PROCEDURE — 83880 ASSAY OF NATRIURETIC PEPTIDE: CPT | Performed by: STUDENT IN AN ORGANIZED HEALTH CARE EDUCATION/TRAINING PROGRAM

## 2023-10-21 PROCEDURE — 83036 HEMOGLOBIN GLYCOSYLATED A1C: CPT | Performed by: FAMILY MEDICINE

## 2023-10-21 PROCEDURE — 84100 ASSAY OF PHOSPHORUS: CPT | Performed by: INTERNAL MEDICINE

## 2023-10-21 PROCEDURE — 80053 COMPREHEN METABOLIC PANEL: CPT | Performed by: FAMILY MEDICINE

## 2023-10-21 PROCEDURE — 36415 COLL VENOUS BLD VENIPUNCTURE: CPT | Performed by: INTERNAL MEDICINE

## 2023-10-21 PROCEDURE — 63600175 PHARM REV CODE 636 W HCPCS: Performed by: INTERNAL MEDICINE

## 2023-10-21 PROCEDURE — 80074 ACUTE HEPATITIS PANEL: CPT | Performed by: FAMILY MEDICINE

## 2023-10-21 PROCEDURE — 25000003 PHARM REV CODE 250: Performed by: FAMILY MEDICINE

## 2023-10-21 PROCEDURE — 83735 ASSAY OF MAGNESIUM: CPT | Performed by: INTERNAL MEDICINE

## 2023-10-21 PROCEDURE — 25000003 PHARM REV CODE 250: Performed by: INTERNAL MEDICINE

## 2023-10-21 PROCEDURE — 87389 HIV-1 AG W/HIV-1&-2 AB AG IA: CPT | Performed by: STUDENT IN AN ORGANIZED HEALTH CARE EDUCATION/TRAINING PROGRAM

## 2023-10-21 PROCEDURE — 21400001 HC TELEMETRY ROOM

## 2023-10-21 PROCEDURE — 93010 ELECTROCARDIOGRAM REPORT: CPT | Mod: ,,, | Performed by: INTERNAL MEDICINE

## 2023-10-21 PROCEDURE — 82728 ASSAY OF FERRITIN: CPT | Performed by: FAMILY MEDICINE

## 2023-10-21 PROCEDURE — 84466 ASSAY OF TRANSFERRIN: CPT | Performed by: FAMILY MEDICINE

## 2023-10-21 PROCEDURE — 85027 COMPLETE CBC AUTOMATED: CPT | Mod: 91 | Performed by: FAMILY MEDICINE

## 2023-10-21 PROCEDURE — 93005 ELECTROCARDIOGRAM TRACING: CPT

## 2023-10-21 PROCEDURE — 86850 RBC ANTIBODY SCREEN: CPT | Performed by: FAMILY MEDICINE

## 2023-10-21 PROCEDURE — 63600175 PHARM REV CODE 636 W HCPCS: Performed by: STUDENT IN AN ORGANIZED HEALTH CARE EDUCATION/TRAINING PROGRAM

## 2023-10-21 PROCEDURE — 87040 BLOOD CULTURE FOR BACTERIA: CPT | Mod: 59 | Performed by: STUDENT IN AN ORGANIZED HEALTH CARE EDUCATION/TRAINING PROGRAM

## 2023-10-21 PROCEDURE — 82746 ASSAY OF FOLIC ACID SERUM: CPT | Performed by: FAMILY MEDICINE

## 2023-10-21 PROCEDURE — 36415 COLL VENOUS BLD VENIPUNCTURE: CPT | Performed by: STUDENT IN AN ORGANIZED HEALTH CARE EDUCATION/TRAINING PROGRAM

## 2023-10-21 PROCEDURE — 82607 VITAMIN B-12: CPT | Performed by: FAMILY MEDICINE

## 2023-10-21 RX ORDER — ACETAMINOPHEN 325 MG/1
650 TABLET ORAL EVERY 4 HOURS PRN
Status: DISCONTINUED | OUTPATIENT
Start: 2023-10-21 | End: 2023-10-25 | Stop reason: HOSPADM

## 2023-10-21 RX ORDER — IBUPROFEN 200 MG
24 TABLET ORAL
Status: DISCONTINUED | OUTPATIENT
Start: 2023-10-21 | End: 2023-10-25 | Stop reason: HOSPADM

## 2023-10-21 RX ORDER — MUPIROCIN 20 MG/G
OINTMENT TOPICAL 2 TIMES DAILY
Status: DISCONTINUED | OUTPATIENT
Start: 2023-10-21 | End: 2023-10-25 | Stop reason: HOSPADM

## 2023-10-21 RX ORDER — HEPARIN SODIUM 5000 [USP'U]/ML
5000 INJECTION, SOLUTION INTRAVENOUS; SUBCUTANEOUS EVERY 8 HOURS
Status: DISCONTINUED | OUTPATIENT
Start: 2023-10-21 | End: 2023-10-22

## 2023-10-21 RX ORDER — MAGNESIUM SULFATE HEPTAHYDRATE 40 MG/ML
2 INJECTION, SOLUTION INTRAVENOUS ONCE
Status: COMPLETED | OUTPATIENT
Start: 2023-10-21 | End: 2023-10-21

## 2023-10-21 RX ORDER — GUAIFENESIN 100 MG/5ML
200 SOLUTION ORAL EVERY 4 HOURS PRN
Status: DISCONTINUED | OUTPATIENT
Start: 2023-10-21 | End: 2023-10-25 | Stop reason: HOSPADM

## 2023-10-21 RX ORDER — INSULIN ASPART 100 [IU]/ML
0-5 INJECTION, SOLUTION INTRAVENOUS; SUBCUTANEOUS
Status: DISCONTINUED | OUTPATIENT
Start: 2023-10-21 | End: 2023-10-25 | Stop reason: HOSPADM

## 2023-10-21 RX ORDER — HYDRALAZINE HYDROCHLORIDE 25 MG/1
50 TABLET, FILM COATED ORAL EVERY 8 HOURS
Status: DISCONTINUED | OUTPATIENT
Start: 2023-10-21 | End: 2023-10-25 | Stop reason: HOSPADM

## 2023-10-21 RX ORDER — METOPROLOL SUCCINATE 50 MG/1
50 TABLET, EXTENDED RELEASE ORAL DAILY
Status: DISCONTINUED | OUTPATIENT
Start: 2023-10-21 | End: 2023-10-21

## 2023-10-21 RX ORDER — METOPROLOL TARTRATE 50 MG/1
50 TABLET ORAL ONCE
Status: COMPLETED | OUTPATIENT
Start: 2023-10-21 | End: 2023-10-21

## 2023-10-21 RX ORDER — GLUCAGON 1 MG
1 KIT INJECTION
Status: DISCONTINUED | OUTPATIENT
Start: 2023-10-21 | End: 2023-10-25 | Stop reason: HOSPADM

## 2023-10-21 RX ORDER — ONDANSETRON 2 MG/ML
4 INJECTION INTRAMUSCULAR; INTRAVENOUS EVERY 8 HOURS PRN
Status: DISCONTINUED | OUTPATIENT
Start: 2023-10-21 | End: 2023-10-25 | Stop reason: HOSPADM

## 2023-10-21 RX ORDER — TALC
6 POWDER (GRAM) TOPICAL NIGHTLY PRN
Status: DISCONTINUED | OUTPATIENT
Start: 2023-10-21 | End: 2023-10-25 | Stop reason: HOSPADM

## 2023-10-21 RX ORDER — NALOXONE HCL 0.4 MG/ML
0.02 VIAL (ML) INJECTION
Status: DISCONTINUED | OUTPATIENT
Start: 2023-10-21 | End: 2023-10-25 | Stop reason: HOSPADM

## 2023-10-21 RX ORDER — AMLODIPINE BESYLATE 5 MG/1
10 TABLET ORAL DAILY
Status: DISCONTINUED | OUTPATIENT
Start: 2023-10-21 | End: 2023-10-25 | Stop reason: HOSPADM

## 2023-10-21 RX ORDER — SODIUM CHLORIDE 0.9 % (FLUSH) 0.9 %
10 SYRINGE (ML) INJECTION
Status: DISCONTINUED | OUTPATIENT
Start: 2023-10-21 | End: 2023-10-25 | Stop reason: HOSPADM

## 2023-10-21 RX ORDER — AMLODIPINE BESYLATE 5 MG/1
5 TABLET ORAL DAILY
Status: DISCONTINUED | OUTPATIENT
Start: 2023-10-21 | End: 2023-10-21

## 2023-10-21 RX ORDER — NICARDIPINE HYDROCHLORIDE 0.2 MG/ML
0-15 INJECTION INTRAVENOUS CONTINUOUS
Status: DISCONTINUED | OUTPATIENT
Start: 2023-10-21 | End: 2023-10-21

## 2023-10-21 RX ORDER — SODIUM CHLORIDE 0.9 % (FLUSH) 0.9 %
10 SYRINGE (ML) INJECTION EVERY 12 HOURS PRN
Status: DISCONTINUED | OUTPATIENT
Start: 2023-10-21 | End: 2023-10-25 | Stop reason: HOSPADM

## 2023-10-21 RX ORDER — IBUPROFEN 200 MG
16 TABLET ORAL
Status: DISCONTINUED | OUTPATIENT
Start: 2023-10-21 | End: 2023-10-25 | Stop reason: HOSPADM

## 2023-10-21 RX ORDER — CARVEDILOL 25 MG/1
25 TABLET ORAL 2 TIMES DAILY
Status: DISCONTINUED | OUTPATIENT
Start: 2023-10-21 | End: 2023-10-25 | Stop reason: HOSPADM

## 2023-10-21 RX ADMIN — GUAIFENESIN 200 MG: 200 SOLUTION ORAL at 09:10

## 2023-10-21 RX ADMIN — CARVEDILOL 25 MG: 25 TABLET, FILM COATED ORAL at 09:10

## 2023-10-21 RX ADMIN — NICARDIPINE HYDROCHLORIDE 2.5 MG/HR: 0.2 INJECTION, SOLUTION INTRAVENOUS at 03:10

## 2023-10-21 RX ADMIN — MAGNESIUM SULFATE HEPTAHYDRATE 2 G: 40 INJECTION, SOLUTION INTRAVENOUS at 08:10

## 2023-10-21 RX ADMIN — METOPROLOL TARTRATE 50 MG: 50 TABLET, FILM COATED ORAL at 04:10

## 2023-10-21 RX ADMIN — GUAIFENESIN 200 MG: 200 SOLUTION ORAL at 04:10

## 2023-10-21 RX ADMIN — MUPIROCIN: 20 OINTMENT TOPICAL at 09:10

## 2023-10-21 RX ADMIN — MUPIROCIN: 20 OINTMENT TOPICAL at 08:10

## 2023-10-21 RX ADMIN — HYDRALAZINE HYDROCHLORIDE 50 MG: 25 TABLET, FILM COATED ORAL at 01:10

## 2023-10-21 RX ADMIN — CARVEDILOL 25 MG: 25 TABLET, FILM COATED ORAL at 08:10

## 2023-10-21 RX ADMIN — HEPARIN SODIUM 5000 UNITS: 5000 INJECTION INTRAVENOUS; SUBCUTANEOUS at 01:10

## 2023-10-21 RX ADMIN — HEPARIN SODIUM 5000 UNITS: 5000 INJECTION INTRAVENOUS; SUBCUTANEOUS at 07:10

## 2023-10-21 RX ADMIN — HYDRALAZINE HYDROCHLORIDE 50 MG: 25 TABLET, FILM COATED ORAL at 09:10

## 2023-10-21 RX ADMIN — AMLODIPINE BESYLATE 10 MG: 5 TABLET ORAL at 08:10

## 2023-10-21 RX ADMIN — HEPARIN SODIUM 5000 UNITS: 5000 INJECTION INTRAVENOUS; SUBCUTANEOUS at 09:10

## 2023-10-21 RX ADMIN — HYDRALAZINE HYDROCHLORIDE 50 MG: 25 TABLET, FILM COATED ORAL at 08:10

## 2023-10-21 NOTE — ASSESSMENT & PLAN NOTE
Body mass index is 37.81 kg/m². Morbid obesity complicates all aspects of disease management from diagnostic modalities to treatment. Weight loss encouraged and health benefits explained to patient.

## 2023-10-21 NOTE — CONSULTS
[facility] Consult Note    Primary Attending Physician: Dr. Booker  Consultant Attending: Dr. Gilbert  Consultant Fellow: Dr. Flores    Reason for Consult:   Emergency dialysis and anemia      Subjective:      History of Present Illness:  Wai Bain is a 53 y.o male with pmh of DMT2, HTN who presented to the ED as advised by PCP for abnormal labs and elevated presure. He was anemic, hg<7, with abnormal renal function. He denies dizziness, fatigue, hematemesis, hematochezia, melena. No other known sources of blood loss. Does endorse feeling weak over the last few weeks, no falls. He has never had colonoscopy or EGD.     Wife states he was on metformin before for diabetes, but was taken off because it altered renal function. Denies HD. She states the metformin was stopped at that time and never resumed. No history of recurrent NSAID use. Denies changes in urinary frequency, no changes in output. Of note he hasn't had insurance and has been without medications for months. Blood pressure is usually greater than 160/90. Denies chest pain, swelling, orthopnea, headache. He does have blurry vision which is chronic and followed outpatient with optometrist.     Past Medical History:  Past Medical History:   Diagnosis Date    CKD (chronic kidney disease), stage V 10/21/2023    Diabetes mellitus     Hypertension     Urinary tract infection        Past Surgical History:  Past Surgical History:   Procedure Laterality Date    ESOPHAGOGASTRODUODENOSCOPY N/A 3/13/2020    Procedure: EGD (ESOPHAGOGASTRODUODENOSCOPY);  Surgeon: Katiuska العراقي MD;  [facility];  Service: Endoscopy;  Laterality: N/A;    LAPAROSCOPIC CHOLECYSTECTOMY N/A 6/22/2020    Procedure: CHOLECYSTECTOMY, LAPAROSCOPIC;  Surgeon: Dayron Conner MD;  [facility];  Service: General;  Laterality: N/A;       Allergies:  Review of patient's allergies indicates:  No Known Allergies    Medications:   In-Hospital  "Scheduled Medications:   amLODIPine  10 mg Oral Daily    carvediloL  25 mg Oral BID    heparin (porcine)  5,000 Units Subcutaneous Q8H    hydrALAZINE  50 mg Oral Q8H    mupirocin   Nasal BID      In-Hospital PRN Medications:  acetaminophen, dextrose 10%, dextrose 10%, glucagon (human recombinant), glucose, glucose, guaiFENesin 100 mg/5 ml, insulin aspart U-100, melatonin, naloxone, ondansetron, sodium chloride 0.9%, sodium chloride 0.9%   In-Hospital IV Infusion Medications:   nicardipine Stopped (10/21/23 0327)      Home Medications:  Prior to Admission medications    Medication Sig Start Date End Date Taking? Authorizing Provider   blood sugar diagnostic Strp USE TO TEST BLOOD SUGAR ONCE DAILY 20   Dayron Buckner MD   blood-glucose meter kit USE AS DIRECTED 1/14/20 4/3/23  Dayron Buckner MD   dicyclomine (BENTYL) 20 mg tablet Take 20 mg by mouth every 6 (six) hours. 10/16/23   Provider, Historical   metoprolol succinate (TOPROL-XL) 50 MG 24 hr tablet Take 50 mg by mouth once daily. 10/16/23   Provider, Historical       Family History:  Family History   Problem Relation Age of Onset    Prostate cancer Neg Hx     Kidney disease Neg Hx        Social History:  Social History     Tobacco Use    Smoking status: Former     Types: Cigarettes    Smokeless tobacco: Never    Tobacco comments:     quit "long time ago"   Substance Use Topics    Alcohol use: Yes     Alcohol/week: 1.0 standard drink of alcohol     Types: 1 Cans of beer per week     Comment: social    Drug use: Never       Review of Systems:  All other systems are reviewed and are negative.     Objective:   Last 24 Hour Vital Signs:  BP  Min: 147/64  Max: 229/101  Temp  Av.3 °F (36.8 °C)  Min: 97.9 °F (36.6 °C)  Max: 98.9 °F (37.2 °C)  Pulse  Av.7  Min: 69  Max: 84  Resp  Av.1  Min: 13  Max: 38  SpO2  Av.6 %  Min: 92 %  Max: 100 %  Height  Av' 7" (170.2 cm)  Min: 5' 7" (170.2 cm)  Max: 5' 7" (170.2 cm)  Weight  Av.7 kg (241 lb " 13.5 oz)  Min: 109.5 kg (241 lb 6.5 oz)  Max: 109.9 kg (242 lb 4.6 oz)  I/O last 3 completed shifts:  In: 2.4 [I.V.:2.4]  Out: -     Physical Examination:  Physical Exam  Vitals reviewed.   Constitutional:       General: He is not in acute distress.     Appearance: Normal appearance. He is obese. He is not ill-appearing.   HENT:      Head: Normocephalic.      Nose: No rhinorrhea.   Eyes:      General: No scleral icterus.        Right eye: No discharge.         Left eye: No discharge.      Extraocular Movements: Extraocular movements intact.      Conjunctiva/sclera: Conjunctivae normal.      Pupils: Pupils are equal, round, and reactive to light.   Cardiovascular:      Rate and Rhythm: Normal rate and regular rhythm.      Pulses: Normal pulses.      Heart sounds: Normal heart sounds. No murmur heard.     No friction rub.   Pulmonary:      Effort: Pulmonary effort is normal. No respiratory distress.      Breath sounds: Normal breath sounds. No wheezing.   Chest:      Chest wall: No tenderness.   Abdominal:      General: Abdomen is flat. Bowel sounds are normal. There is no distension.      Palpations: Abdomen is soft.      Tenderness: There is no abdominal tenderness. There is no guarding or rebound.   Musculoskeletal:         General: No swelling, tenderness, deformity or signs of injury.      Right lower leg: No edema.      Left lower leg: No edema.   Skin:     Coloration: Skin is not jaundiced.      Findings: No bruising, erythema or rash.   Neurological:      General: No focal deficit present.      Mental Status: He is alert and oriented to person, place, and time.      Motor: No weakness.   Psychiatric:         Mood and Affect: Mood normal.         Behavior: Behavior normal.          Laboratory:  Trended Lab Data:  Recent Labs     10/20/23  1609 10/21/23  0309 10/21/23  0310   WBC 9.23 11.30 11.27   HGB 6.5* 6.9* 6.9*   HCT 20.0* 21.4* 21.3*    249 233    141  --    K 5.2* 4.9  --     110  --     CO2 16* 14*  --    BUN 84* 92*  --    CREATININE 10.49* 10.6*  --     162*  --    BILITOT 0.4 0.4  --    AST 23 13  --    ALT 19 11  --    ALKPHOS 67 54*  --    CALCIUM 7.4* 7.9*  --    ALBUMIN 3.5 2.4*  --    PROT 7.2 6.3  --    MG 1.9 1.8  --    PHOS  --  8.4*  --        Cardiac:   Recent Labs   Lab 10/21/23  0756   *       Urinalysis:   Lab Results   Component Value Date    LABURIN No growth 01/21/2020    COLORU Yellow 10/21/2023    SPECGRAV 1.025 10/21/2023    NITRITE Negative 10/21/2023    KETONESU Negative 10/21/2023    UROBILINOGEN Negative 10/21/2023       Microbiology:  Microbiology Results (last 7 days)       Procedure Component Value Units Date/Time    Blood culture [[id number]] Collected: 10/21/23 0756    Order Status: Sent Specimen: Blood Updated: 10/21/23 0756    Blood culture [[id number]] Collected: 10/21/23 0756    Order Status: Sent Specimen: Blood Updated: 10/21/23 0756            Radiology:  I have personally reviewed the above labs and imaging.    Current Medications:     Infusions:   nicardipine Stopped (10/21/23 0327)        Scheduled:   amLODIPine  10 mg Oral Daily    carvediloL  25 mg Oral BID    heparin (porcine)  5,000 Units Subcutaneous Q8H    hydrALAZINE  50 mg Oral Q8H    mupirocin   Nasal BID        PRN:  acetaminophen, dextrose 10%, dextrose 10%, glucagon (human recombinant), glucose, glucose, guaiFENesin 100 mg/5 ml, insulin aspart U-100, melatonin, naloxone, ondansetron, sodium chloride 0.9%, sodium chloride 0.9%     Assessment:     Wai Bain is a 53 y.o. male with:  Patient Active Problem List    Diagnosis Date Noted    Anemia due to chronic kidney disease 10/21/2023    CKD (chronic kidney disease), stage V 10/21/2023    Cough in adult 10/21/2023    Severe obesity (BMI >= 40) 10/21/2023    Type 2 diabetes mellitus with both eyes affected by proliferative retinopathy and macular edema, without long-term current use of insulin 01/05/2023    Hypertensive  retinopathy, bilateral 01/05/2023    NS (nuclear sclerosis), bilateral 01/05/2023    Cholelithiasis 06/22/2020    RUQ pain 03/13/2020    Early satiety 03/10/2020    RUQ abdominal pain 01/21/2020    Right flank pain 01/21/2020    Microscopic hematuria 01/21/2020    Type 2 diabetes mellitus with hyperglycemia, without long-term current use of insulin 01/14/2020    Essential hypertension 01/14/2020        Plan:     Neuro/cns:no acute issues.    Cvs  #HTN/hypertensive emergency: history of hypertension. Baseline SBP~160. Started on Cardene gtt for elevated pressure, max >220 SBP. Cardene gtt discontinued. Continue amlodipine,  carvedilol, and hydralazine for control. Goal decrease BP 20% 24 hours. No acute decreases.   -consider TTE. , no baseline.     Pulm  #Pulmonary edema on CT chest 2/2 hypertensive urgency/or undiagnosed CHF. Clinical exam shows no signs of volume overload, no lower extremity swelling and no crackles appreciated. No history of HF, likely 2/2 hypertensive emergency or fluid overload 2/2 acute renal failure.   - Consider diuresis with lasix.    - Consider TTE study.     Endocrine:   #DM: history of DM. Was on metformin about 5-6 months ago, however, discontinued 2/2 kidney function. Has not taken metformin since.  SSI- inpatient. A1c 5.6.    Renal   #NESTOR/esrd: no known history of renal disease. No acute changes in medications, not on metformin/lithium. However, unable to obtain medication due to not having medical insurance. No chronic NSAID USE.   -nephrology consulted.   -US kidneys pending to r/o obstructive etiology, however patient has great UOP so unlikely  - likely need emergent dialysis with electrolyte derangements, acidotic, uremia    GI: NPO  #electrolytes. Replete electrolyes PRN. K>4, however avoid above 4.5 in setting of renal failure. Mg>2.     Heme/onc:    #microcytic anemia . Hg 6.9. no source of active bleeding. No history of EGD or colonoscopy. Can be 2/2 fluid overload and  dilution, chronic GI bleed/malignancy,  or chronic disease. Iron studies pending. No hematemesis, hematochezia, melena. No history of chronic nsaids use.   -transfuse hg<7. S/p 1upRBCs.   -occult blood stool study pending.     Psych: no acute issues.     Thank you for allowing us to participate in the care of this patient. Please contact me if you have any questions regarding this consult.    Gretel Stanton MD ho-1  [facility]     Pt seen and examined with Pulmonary/Critical Care team and this note reviewed and validated with the following additional comments: No indication for emergent dialysis.  Still makes urine.  Acid-base OK.  No uremic symptoms.  BP better since we started home meds.    Medical Decision Making (MDM) was complex.  The number and complexity of problems addressed was renal, cardiovascular.  The amount and complexity of data reviewed was high.  The risk of complications and/or morbidity/mortality was high.  The tests ordered were BMP.  I communicated with the following providers .  Time spent in the care of this patient was 35 minutes    Reji Price MD  Phone [phone]

## 2023-10-21 NOTE — PLAN OF CARE
10/21/23 1552   Admission   Initial VN Admission Questions Complete   Communication Issues?   ( required; declined and preferred wife at bedside to interpret)   Shift   Virtual Nurse - Rounding Complete   Pain Management Interventions care clustered;diversional activity provided;pain management plan reviewed with patient/caregiver;quiet environment facilitated;relaxation techniques promoted   Virtual Nurse - Patient Verbalized Approval Of Camera Use;VN Rounding   Type of Frequent Check   Type Patient Rounds;Telemetry Monitoring   Safety/Activity   Patient Rounds bed in low position;placement of personal items at bedside;bed wheels locked;call light in patient/parent reach;visualized patient;clutter free environment maintained;ID band on   Safety Promotion/Fall Prevention Fall Risk signage in place;family to remain at bedside;assistive device/personal item within reach;diversional activities provided;Fall Risk reviewed with patient/family;medications reviewed;nonskid shoes/socks when out of bed;room near unit station;side rails raised x 2;instructed to call staff for mobility   Safety Precautions emergency equipment at bedside   Positioning   Body Position position changed independently;supine   Head of Bed (HOB) Positioning HOB at 30-45 degrees   Pain/Comfort/Sleep   Preferred Pain Scale number (Numeric Rating Pain Scale)   Cardiac   Cardiac/Telemetry Monitor On Yes   ECG   Rhythm normal sinus rhythm     VN cued into patient's room for introduction with patient's permission. Wife and daughter at bedside. Patient refused  and requested wife translates. VN role explained and informed patient that VN would be working with bedside nurse and rest of care team.  Plan of care reviewed. Fall risk and bed alarm protocol education provided.  Instructed patient to call for assistance.  Patient aware and agreeable.  Patient's chart, labs and vital signs reviewed. Confirmed home medication list  correct.  Allowed time for questions.  No acute distress noted.  Will continue to be available as needed.

## 2023-10-21 NOTE — NURSING
MD at bedside to assess patient and answer patient and spouse's questions. MD gave the okay to turn off Cardene gtts. MD also verbal order to change the dose to metoprolol to now as well as Protonix for his reflux. If patient becomes hypertensive above sys 180s, restart Cardene.

## 2023-10-21 NOTE — NURSING
Received report from ICU nurse, pt arrived on unit. VS taken. Call bell within reach. Bed in lowest position.

## 2023-10-21 NOTE — HPI
54 YO M morbidly obese, HTN, DM was transferred from [facility] to [facility] for severe CKD, HTN urgency and severe anemia. Per wife for the past 4 months patient did not have insurance and could not afford his medications. He recently had his insurance back and went to see his PCP, labs work done which was abnormal and was called to go to [facility] ED. Then in [facility] ED BP was found to be systolic >229, labs with Hg 6.5. he stated due to his diabetes he was having blurry vision and had scheduled monthly injection which also he missed for the past few months. Per wife he started to have kidney problem four months ago and has not been follow up with nephrologist since then. When seen with notion of dry cough, reflux acid, associated with epigastric pain. Denies typical CP or SOB, no palpitation. In [facility] ED he was placed on cardene drip and received one unit of PRBC.

## 2023-10-21 NOTE — CARE UPDATE
Patient with spouse at the bedside, updated on lab result and imaging report  S/p 1 unit packed red blood cell-H&H still low-monitor, stool occult test,  Elevated blood pressure-resolved-weaned off Cardene continue on home amlodipine, Coreg, hydralazine  Patient stated he make urine but not able to quantify, there is associated frequency. Awaits nephrology rec's   Rounding completed with  curt Landeros, #[id number].

## 2023-10-21 NOTE — SUBJECTIVE & OBJECTIVE
"Past Medical History:   Diagnosis Date    Diabetes mellitus     Hypertension     Urinary tract infection        Past Surgical History:   Procedure Laterality Date    ESOPHAGOGASTRODUODENOSCOPY N/A 3/13/2020    Procedure: EGD (ESOPHAGOGASTRODUODENOSCOPY);  Surgeon: Katiuska العراقي MD;  Location: [facility] ENDO;  Service: Endoscopy;  Laterality: N/A;    LAPAROSCOPIC CHOLECYSTECTOMY N/A 6/22/2020    Procedure: CHOLECYSTECTOMY, LAPAROSCOPIC;  Surgeon: Dayron Conner MD;  Location: [facility] OR;  Service: General;  Laterality: N/A;       Review of patient's allergies indicates:  No Known Allergies    Current Facility-Administered Medications on File Prior to Encounter   Medication    [COMPLETED] amLODIPine tablet 10 mg    [COMPLETED] labetaloL injection 10 mg    [COMPLETED] labetaloL injection 20 mg    [DISCONTINUED] 0.9%  NaCl infusion (for blood administration)    [DISCONTINUED] niCARdipine 40 mg/200 mL (0.2 mg/mL) infusion     Current Outpatient Medications on File Prior to Encounter   Medication Sig    blood sugar diagnostic Strp USE TO TEST BLOOD SUGAR ONCE DAILY    blood-glucose meter kit USE AS DIRECTED    dicyclomine (BENTYL) 20 mg tablet Take 20 mg by mouth every 6 (six) hours.    metoprolol succinate (TOPROL-XL) 50 MG 24 hr tablet Take 50 mg by mouth once daily.     Family History    None       Tobacco Use    Smoking status: Former     Types: Cigarettes    Smokeless tobacco: Never    Tobacco comments:     quit "long time ago"   Substance and Sexual Activity    Alcohol use: Yes     Alcohol/week: 1.0 standard drink of alcohol     Types: 1 Cans of beer per week     Comment: social    Drug use: Never    Sexual activity: Yes     Partners: Female     Review of Systems   Constitutional:  Negative for activity change, appetite change and chills.   Respiratory:  Positive for cough. Negative for chest tightness, shortness of breath and wheezing.    Cardiovascular:  Negative for chest pain, palpitations and leg swelling. "   Gastrointestinal:  Negative for abdominal pain, constipation, diarrhea, nausea and vomiting.   Musculoskeletal:  Negative for back pain and gait problem.   Neurological:  Negative for dizziness, weakness and headaches.   Psychiatric/Behavioral:  Negative for agitation, confusion and hallucinations.    All other systems reviewed and are negative.    Objective:     Vital Signs (Most Recent):  Pulse: 81 (10/21/23 0330)  Resp: (!) 28 (10/21/23 0330)  BP: (!) 154/72 (10/21/23 0330)  SpO2: 96 % (10/21/23 0330) Vital Signs (24h Range):  Temp:  [97.9 °F (36.6 °C)-98.9 °F (37.2 °C)] 98.1 °F (36.7 °C)  Pulse:  [72-84] 81  Resp:  [14-36] 28  SpO2:  [93 %-100 %] 96 %  BP: (147-229)/() 154/72        There is no height or weight on file to calculate BMI.     Physical Exam  Vitals and nursing note reviewed.   Constitutional:       General: He is not in acute distress.     Appearance: He is not toxic-appearing.   HENT:      Head: Normocephalic and atraumatic.      Nose: No congestion.      Mouth/Throat:      Mouth: Mucous membranes are moist.      Pharynx: No oropharyngeal exudate.   Eyes:      Extraocular Movements: Extraocular movements intact.      Pupils: Pupils are equal, round, and reactive to light.   Cardiovascular:      Rate and Rhythm: Normal rate and regular rhythm.      Heart sounds:      No friction rub. No gallop.   Pulmonary:      Effort: Pulmonary effort is normal. No respiratory distress.      Breath sounds: No wheezing or rales.   Chest:      Chest wall: No tenderness.   Abdominal:      General: Bowel sounds are normal. There is no distension.      Tenderness: There is no abdominal tenderness. There is no guarding or rebound.   Musculoskeletal:         General: No swelling or tenderness.      Cervical back: No rigidity or tenderness.      Right lower leg: No edema.      Left lower leg: No edema.   Skin:     Findings: No erythema or rash.   Neurological:      General: No focal deficit present.      Mental  "Status: He is alert and oriented to person, place, and time.      Cranial Nerves: No cranial nerve deficit.      Motor: No weakness.      Coordination: Coordination normal.      Gait: Gait normal.   Psychiatric:         Thought Content: Thought content normal.              CRANIAL NERVES     CN III, IV, VI   Pupils are equal, round, and reactive to light.       Significant Labs: All pertinent labs within the past 24 hours have been reviewed.  A1C: No results for input(s): "HGBA1C" in the last 4320 hours.  Cardiac Markers: No results for input(s): "CKMB", "MYOGLOBIN", "BNP", "TROPISTAT" in the last 48 hours.  Coagulation: No results for input(s): "PT", "INR", "APTT" in the last 48 hours.  Lactic Acid: No results for input(s): "LACTATE" in the last 48 hours.  Lipase: No results for input(s): "LIPASE" in the last 48 hours.  Lipid Panel: No results for input(s): "CHOL", "HDL", "LDLCALC", "TRIG", "CHOLHDL" in the last 48 hours.  Recent Lab Results  (Last 5 results in the past 24 hours)        10/21/23  0310   10/21/23  0309   10/21/23  0042   10/20/23  1645   10/20/23  1609        Lipase Result         248       Unit Blood Type Code       5100         Unit Expiration       [id number]         Unit Blood Type       O POS         Albumin         3.5       ALP         67       ALT         19       Anion Gap         17       Appearance, UA     Clear           AST         23       Bacteria, UA     Many           Baso #         0.01       Basophil %         0.1       Bilirubin (UA)     Negative           BILIRUBIN TOTAL         0.4  Comment: For infants and newborns, interpretation of results should be based  on gestational age, weight and in agreement with clinical  observations.    Premature Infant recommended reference ranges:  Up to 24 hours.............<8.0 mg/dL  Up to 48 hours............<12.0 mg/dL  3-5 days..................<15.0 mg/dL  6-29 days.................<15.0 mg/dL         BUN         84       Calcium        "  7.4       Chloride         108       CO2         16       CODING SYSTEM       [id number]         Color, UA     Yellow           Creatinine         10.49       Crossmatch Interpretation       Compatible         Differential Method         Automated       DISPENSE STATUS       TRANSFUSED         eGFR         5.4       Eos #         0.4       Eosinophil %         3.9       Glucose         102       Glucose, UA     1+           Gran # (ANC)         7.1       Gran %         77.0       Granular Casts, UA     14           Group & Rh       O POS         Hematocrit 21.3   21.4       20.0       Hemoglobin 6.9   6.9       6.5       Hyaline Casts, UA     0           Immature Grans (Abs)         0.04  Comment: Mild elevation in immature granulocytes is non specific and   can be seen in a variety of conditions including stress response,   acute inflammation, trauma and pregnancy. Correlation with other   laboratory and clinical findings is essential.         Immature Granulocytes         0.4       INDIRECT DARYN       NEG         Ketones, UA     Negative           Leukocytes, UA     Negative           Lymph #         0.9       Lymph %         10.1       Magnesium    1.8       1.9       MCH 26.8   26.7       26.9       MCHC 32.4   32.2       32.5       MCV 83   83       83       Microscopic Comment     SEE COMMENT  Comment: Other formed elements not mentioned in the report are not   present in the microscopic examination.              Mono #         0.8       Mono %         8.5       MPV 8.5   8.5       8.1       NITRITE UA     Negative           nRBC         0       Occult Blood UA     1+           pH, UA     6.0           Phosphorus Level   8.4             Platelet Count 233   249       236       Potassium         5.2       Product Code       [id number]         PROTEIN TOTAL         7.2       Protein, UA     3+  Comment: Recommend a 24 hour urine protein or a urine   protein/creatinine ratio if globulin induced proteinuria  is  clinically suspected.             RBC 2.57   2.58       2.42       RBC, UA     7           RDW 13.0   13.1       12.9       Sodium         141       Specific [place], UA     1.025           Specimen Outdate       10/23/2023 23:59         Specimen UA     Urine, Clean Catch           Squam Epithel, UA     6           UNIT NUMBER       [id number]         UROBILINOGEN UA     Negative           WBC, UA     6           WBC 11.27   11.30       9.23       Yeast, UA     Moderate                                  Significant Imaging: I have reviewed all pertinent imaging results/findings within the past 24 hours.

## 2023-10-21 NOTE — ASSESSMENT & PLAN NOTE
-S/P one unit of PRBC, repeat H/H  -new consent signed  -monitor H/H and monitor for bleeding  -will get anemia profile

## 2023-10-21 NOTE — H&P
[facility]  History & Physical    Patient Name: Wai Bain  MRN: [id number]  Patient Class: IP- Inpatient  Admission Date: 10/21/2023  Attending Physician: Tim Boston MD   Primary Care Provider: Abhi Peralta MD         Patient information was obtained from patient, spouse/SO and ER records.     Subjective:     Principal Problem:<principal problem not specified>    Chief Complaint: No chief complaint on file.       HPI: 52 YO M morbidly obese, HTN, DM was transferred from [facility] to [facility] for severe CKD, HTN urgency and severe anemia. Per wife for the past 4 months patient did not have insurance and could not afford his medications. He recently had his insurance back and went to see his PCP, labs work done which was abnormal and was called to go to [facility] ED. Then in [facility] ED BP was found to be systolic >229, labs with Hg 6.5. he stated due to his diabetes he was having blurry vision and had scheduled monthly injection which also he missed for the past few months. Per wife he started to have kidney problem four months ago and has not been follow up with nephrologist since then. When seen with notion of dry cough, reflux acid, associated with epigastric pain. Denies typical CP or SOB, no palpitation. In [facility] ED he was placed on cardene drip and received one unit of PRBC.       Past Medical History:   Diagnosis Date    Diabetes mellitus     Hypertension     Urinary tract infection        Past Surgical History:   Procedure Laterality Date    ESOPHAGOGASTRODUODENOSCOPY N/A 3/13/2020    Procedure: EGD (ESOPHAGOGASTRODUODENOSCOPY);  Surgeon: Katiuska العراقي MD;  Location: [facility] ENDO;  Service: Endoscopy;  Laterality: N/A;    LAPAROSCOPIC CHOLECYSTECTOMY N/A 6/22/2020    Procedure: CHOLECYSTECTOMY, LAPAROSCOPIC;  Surgeon: Dayron Conner MD;  Location: [facility] OR;  Service: General;  Laterality: N/A;       Review of patient's allergies  "indicates:  No Known Allergies    Current Facility-Administered Medications on File Prior to Encounter   Medication    [COMPLETED] amLODIPine tablet 10 mg    [COMPLETED] labetaloL injection 10 mg    [COMPLETED] labetaloL injection 20 mg    [DISCONTINUED] 0.9%  NaCl infusion (for blood administration)    [DISCONTINUED] niCARdipine 40 mg/200 mL (0.2 mg/mL) infusion     Current Outpatient Medications on File Prior to Encounter   Medication Sig    blood sugar diagnostic Strp USE TO TEST BLOOD SUGAR ONCE DAILY    blood-glucose meter kit USE AS DIRECTED    dicyclomine (BENTYL) 20 mg tablet Take 20 mg by mouth every 6 (six) hours.    metoprolol succinate (TOPROL-XL) 50 MG 24 hr tablet Take 50 mg by mouth once daily.     Family History    None       Tobacco Use    Smoking status: Former     Types: Cigarettes    Smokeless tobacco: Never    Tobacco comments:     quit "long time ago"   Substance and Sexual Activity    Alcohol use: Yes     Alcohol/week: 1.0 standard drink of alcohol     Types: 1 Cans of beer per week     Comment: social    Drug use: Never    Sexual activity: Yes     Partners: Female     Review of Systems   Constitutional:  Negative for activity change, appetite change and chills.   Respiratory:  Positive for cough. Negative for chest tightness, shortness of breath and wheezing.    Cardiovascular:  Negative for chest pain, palpitations and leg swelling.   Gastrointestinal:  Negative for abdominal pain, constipation, diarrhea, nausea and vomiting.   Musculoskeletal:  Negative for back pain and gait problem.   Neurological:  Negative for dizziness, weakness and headaches.   Psychiatric/Behavioral:  Negative for agitation, confusion and hallucinations.    All other systems reviewed and are negative.    Objective:     Vital Signs (Most Recent):  Pulse: 81 (10/21/23 0330)  Resp: (!) 28 (10/21/23 0330)  BP: (!) 154/72 (10/21/23 0330)  SpO2: 96 % (10/21/23 0330) Vital Signs (24h Range):  Temp:  [97.9 °F (36.6 °C)-98.9 " "°F (37.2 °C)] 98.1 °F (36.7 °C)  Pulse:  [72-84] 81  Resp:  [14-36] 28  SpO2:  [93 %-100 %] 96 %  BP: (147-229)/() 154/72        There is no height or weight on file to calculate BMI.     Physical Exam  Vitals and nursing note reviewed.   Constitutional:       General: He is not in acute distress.     Appearance: He is not toxic-appearing.   HENT:      Head: Normocephalic and atraumatic.      Nose: No congestion.      Mouth/Throat:      Mouth: Mucous membranes are moist.      Pharynx: No oropharyngeal exudate.   Eyes:      Extraocular Movements: Extraocular movements intact.      Pupils: Pupils are equal, round, and reactive to light.   Cardiovascular:      Rate and Rhythm: Normal rate and regular rhythm.      Heart sounds:      No friction rub. No gallop.   Pulmonary:      Effort: Pulmonary effort is normal. No respiratory distress.      Breath sounds: No wheezing or rales.   Chest:      Chest wall: No tenderness.   Abdominal:      General: Bowel sounds are normal. There is no distension.      Tenderness: There is no abdominal tenderness. There is no guarding or rebound.   Musculoskeletal:         General: No swelling or tenderness.      Cervical back: No rigidity or tenderness.      Right lower leg: No edema.      Left lower leg: No edema.   Skin:     Findings: No erythema or rash.   Neurological:      General: No focal deficit present.      Mental Status: He is alert and oriented to person, place, and time.      Cranial Nerves: No cranial nerve deficit.      Motor: No weakness.      Coordination: Coordination normal.      Gait: Gait normal.   Psychiatric:         Thought Content: Thought content normal.              CRANIAL NERVES     CN III, IV, VI   Pupils are equal, round, and reactive to light.       Significant Labs: All pertinent labs within the past 24 hours have been reviewed.  A1C: No results for input(s): "HGBA1C" in the last 4320 hours.  Cardiac Markers: No results for input(s): "CKMB", " ""MYOGLOBIN", "BNP", "TROPISTAT" in the last 48 hours.  Coagulation: No results for input(s): "PT", "INR", "APTT" in the last 48 hours.  Lactic Acid: No results for input(s): "LACTATE" in the last 48 hours.  Lipase: No results for input(s): "LIPASE" in the last 48 hours.  Lipid Panel: No results for input(s): "CHOL", "HDL", "LDLCALC", "TRIG", "CHOLHDL" in the last 48 hours.  Recent Lab Results  (Last 5 results in the past 24 hours)        10/21/23  0310   10/21/23  0309   10/21/23  0042   10/20/23  1645   10/20/23  1609        Lipase Result         248       Unit Blood Type Code       5100         Unit Expiration       [id number]         Unit Blood Type       O POS         Albumin         3.5       ALP         67       ALT         19       Anion Gap         17       Appearance, UA     Clear           AST         23       Bacteria, UA     Many           Baso #         0.01       Basophil %         0.1       Bilirubin (UA)     Negative           BILIRUBIN TOTAL         0.4  Comment: For infants and newborns, interpretation of results should be based  on gestational age, weight and in agreement with clinical  observations.    Premature Infant recommended reference ranges:  Up to 24 hours.............<8.0 mg/dL  Up to 48 hours............<12.0 mg/dL  3-5 days..................<15.0 mg/dL  6-29 days.................<15.0 mg/dL         BUN         84       Calcium         7.4       Chloride         108       CO2         16       CODING SYSTEM       XDEX299         Color, UA     Yellow           Creatinine         10.49       Crossmatch Interpretation       Compatible         Differential Method         Automated       DISPENSE STATUS       TRANSFUSED         eGFR         5.4       Eos #         0.4       Eosinophil %         3.9       Glucose         102       Glucose, UA     1+           Gran # (ANC)         7.1       Gran %         77.0       Granular Casts, UA     14           Group & Rh       O POS         Hematocrit " 21.3   21.4       20.0       Hemoglobin 6.9   6.9       6.5       Hyaline Casts, UA     0           Immature Grans (Abs)         0.04  Comment: Mild elevation in immature granulocytes is non specific and   can be seen in a variety of conditions including stress response,   acute inflammation, trauma and pregnancy. Correlation with other   laboratory and clinical findings is essential.         Immature Granulocytes         0.4       INDIRECT DARYN       NEG         Ketones, UA     Negative           Leukocytes, UA     Negative           Lymph #         0.9       Lymph %         10.1       Magnesium    1.8       1.9       MCH 26.8   26.7       26.9       MCHC 32.4   32.2       32.5       MCV 83   83       83       Microscopic Comment     SEE COMMENT  Comment: Other formed elements not mentioned in the report are not   present in the microscopic examination.              Mono #         0.8       Mono %         8.5       MPV 8.5   8.5       8.1       NITRITE UA     Negative           nRBC         0       Occult Blood UA     1+           pH, UA     6.0           Phosphorus Level   8.4             Platelet Count 233   249       236       Potassium         5.2       Product Code       [id number]         PROTEIN TOTAL         7.2       Protein, UA     3+  Comment: Recommend a 24 hour urine protein or a urine   protein/creatinine ratio if globulin induced proteinuria is  clinically suspected.             RBC 2.57   2.58       2.42       RBC, UA     7           RDW 13.0   13.1       12.9       Sodium         141       Specific [place], UA     1.025           Specimen Outdate       10/23/2023 23:59         Specimen UA     Urine, Clean Catch           Squam Epithel, UA     6           UNIT NUMBER       [id number]         UROBILINOGEN UA     Negative           WBC, UA     6           WBC 11.27   11.30       9.23       Yeast, UA     Moderate                                  Significant Imaging: I have reviewed all pertinent  imaging results/findings within the past 24 hours.  Assessment/Plan:     Severe obesity (BMI >= 40)  Body mass index is 37.81 kg/m². Morbid obesity complicates all aspects of disease management from diagnostic modalities to treatment. Weight loss encouraged and health benefits explained to patient.         Cough in adult  -cont monitor  -will give robitussin      CKD (chronic kidney disease), stage V  -likely ESRD  -will consult nephrology and F/U recommendation      Anemia due to chronic kidney disease  -S/P one unit of PRBC, repeat H/H  -new consent signed  -monitor H/H and monitor for bleeding  -will get anemia profile      Hypertensive retinopathy, bilateral  -notion of eye injection monthly with blurry vision  -will consult ophthalmology      Microscopic hematuria  -persist  -defer to urology Vs nephrology       Essential hypertension  -resume home meds with metoprolol and add amlodipine      Type 2 diabetes mellitus with hyperglycemia, without long-term current use of insulin  -monitor FS and start ISS  -will get A1C        VTE Risk Mitigation (From admission, onward)           Ordered     heparin (porcine) injection 5,000 Units  Every 8 hours         10/21/23 0245     IP VTE HIGH RISK PATIENT  Once         10/21/23 0245     Place sequential compression device  Until discontinued         10/21/23 0245                  Critical care time spent on the evaluation and treatment of severe organ dysfunction, review of pertinent labs and imaging studies, discussions with consulting providers and discussions with patient/family: >45 minutes.                 Nico Valenzuela MD  Department of Hospital Medicine  [facility] - Intensive Care    DUE - given  Family History   Problem Relation Age of Onset    Prostate cancer Neg Hx     Kidney disease Neg Hx      Pertinent information:

## 2023-10-22 PROBLEM — E87.5 HYPERKALEMIA: Status: ACTIVE | Noted: 2023-10-22

## 2023-10-22 PROBLEM — D64.9 ANEMIA: Status: ACTIVE | Noted: 2023-10-22

## 2023-10-22 LAB
25(OH)D3+25(OH)D2 SERPL-MCNC: 16 NG/ML (ref 30–96)
ALBUMIN SERPL BCP-MCNC: 2.4 G/DL (ref 3.5–5.2)
ALBUMIN/CREAT UR: 3022.1 UG/MG (ref 0–30)
ALP SERPL-CCNC: 52 U/L (ref 55–135)
ALT SERPL W/O P-5'-P-CCNC: 12 U/L (ref 10–44)
ANION GAP SERPL CALC-SCNC: 13 MMOL/L (ref 8–16)
AST SERPL-CCNC: 14 U/L (ref 10–40)
BILIRUB SERPL-MCNC: 0.4 MG/DL (ref 0.1–1)
BLD PROD TYP BPU: NORMAL
BLD PROD TYP BPU: NORMAL
BLOOD UNIT EXPIRATION DATE: NORMAL
BLOOD UNIT EXPIRATION DATE: NORMAL
BLOOD UNIT TYPE CODE: 5100
BLOOD UNIT TYPE CODE: 5100
BLOOD UNIT TYPE: NORMAL
BLOOD UNIT TYPE: NORMAL
BUN SERPL-MCNC: 96 MG/DL (ref 6–20)
C3 SERPL-MCNC: 138 MG/DL (ref 50–180)
C4 SERPL-MCNC: 47 MG/DL (ref 11–44)
CALCIUM SERPL-MCNC: 8.2 MG/DL (ref 8.7–10.5)
CHLORIDE SERPL-SCNC: 111 MMOL/L (ref 95–110)
CHOLEST SERPL-MCNC: 132 MG/DL (ref 120–199)
CHOLEST/HDLC SERPL: 6.9 {RATIO} (ref 2–5)
CO2 SERPL-SCNC: 15 MMOL/L (ref 23–29)
CODING SYSTEM: NORMAL
CODING SYSTEM: NORMAL
CREAT SERPL-MCNC: 11.2 MG/DL (ref 0.5–1.4)
CREAT UR-MCNC: 90.6 MG/DL (ref 23–375)
CREAT UR-MCNC: 90.6 MG/DL (ref 23–375)
CROSSMATCH INTERPRETATION: NORMAL
CROSSMATCH INTERPRETATION: NORMAL
CRP SERPL-MCNC: 39 MG/L (ref 0–8.2)
DISPENSE STATUS: NORMAL
DISPENSE STATUS: NORMAL
ERYTHROCYTE [DISTWIDTH] IN BLOOD BY AUTOMATED COUNT: 13 % (ref 11.5–14.5)
ERYTHROCYTE [SEDIMENTATION RATE] IN BLOOD BY PHOTOMETRIC METHOD: 66 MM/HR (ref 0–23)
EST. GFR  (NO RACE VARIABLE): 5 ML/MIN/1.73 M^2
GLUCOSE SERPL-MCNC: 124 MG/DL (ref 70–110)
HCT VFR BLD AUTO: 19.6 % (ref 40–54)
HDLC SERPL-MCNC: 19 MG/DL (ref 40–75)
HDLC SERPL: 14.4 % (ref 20–50)
HGB BLD-MCNC: 6.4 G/DL (ref 14–18)
LDLC SERPL CALC-MCNC: 86 MG/DL (ref 63–159)
MCH RBC QN AUTO: 26.9 PG (ref 27–31)
MCHC RBC AUTO-ENTMCNC: 32.7 G/DL (ref 32–36)
MCV RBC AUTO: 82 FL (ref 82–98)
MICROALBUMIN UR DL<=1MG/L-MCNC: 2738 UG/ML
NONHDLC SERPL-MCNC: 113 MG/DL
OB PNL STL: NEGATIVE
PLATELET # BLD AUTO: 223 K/UL (ref 150–450)
PMV BLD AUTO: 8.8 FL (ref 9.2–12.9)
POCT GLUCOSE: 121 MG/DL (ref 70–110)
POCT GLUCOSE: 130 MG/DL (ref 70–110)
POCT GLUCOSE: 137 MG/DL (ref 70–110)
POCT GLUCOSE: 144 MG/DL (ref 70–110)
POTASSIUM SERPL-SCNC: 5.5 MMOL/L (ref 3.5–5.1)
PROT SERPL-MCNC: 6.1 G/DL (ref 6–8.4)
PROT UR-MCNC: 442 MG/DL (ref 0–15)
PROT/CREAT UR: 4.88 MG/G{CREAT} (ref 0–0.2)
PTH-INTACT SERPL-MCNC: 427.6 PG/ML (ref 9–77)
RBC # BLD AUTO: 2.38 M/UL (ref 4.6–6.2)
SODIUM SERPL-SCNC: 139 MMOL/L (ref 136–145)
TRANS ERYTHROCYTES VOL PATIENT: NORMAL ML
TRANS ERYTHROCYTES VOL PATIENT: NORMAL ML
TRIGL SERPL-MCNC: 135 MG/DL (ref 30–150)
WBC # BLD AUTO: 9.22 K/UL (ref 3.9–12.7)

## 2023-10-22 PROCEDURE — 36415 COLL VENOUS BLD VENIPUNCTURE: CPT | Performed by: STUDENT IN AN ORGANIZED HEALTH CARE EDUCATION/TRAINING PROGRAM

## 2023-10-22 PROCEDURE — 21400001 HC TELEMETRY ROOM

## 2023-10-22 PROCEDURE — 84165 PATHOLOGIST INTERPRETATION SPE: ICD-10-PCS | Mod: 26,,, | Performed by: PATHOLOGY

## 2023-10-22 PROCEDURE — 94761 N-INVAS EAR/PLS OXIMETRY MLT: CPT

## 2023-10-22 PROCEDURE — 85027 COMPLETE CBC AUTOMATED: CPT | Performed by: FAMILY MEDICINE

## 2023-10-22 PROCEDURE — 25000003 PHARM REV CODE 250: Performed by: FAMILY MEDICINE

## 2023-10-22 PROCEDURE — 86235 NUCLEAR ANTIGEN ANTIBODY: CPT | Performed by: STUDENT IN AN ORGANIZED HEALTH CARE EDUCATION/TRAINING PROGRAM

## 2023-10-22 PROCEDURE — 86225 DNA ANTIBODY NATIVE: CPT | Performed by: STUDENT IN AN ORGANIZED HEALTH CARE EDUCATION/TRAINING PROGRAM

## 2023-10-22 PROCEDURE — 86334 PATHOLOGIST INTERPRETATION IFE: ICD-10-PCS | Mod: 26,,, | Performed by: PATHOLOGY

## 2023-10-22 PROCEDURE — P9021 RED BLOOD CELLS UNIT: HCPCS | Performed by: FAMILY MEDICINE

## 2023-10-22 PROCEDURE — 80053 COMPREHEN METABOLIC PANEL: CPT | Performed by: FAMILY MEDICINE

## 2023-10-22 PROCEDURE — 85652 RBC SED RATE AUTOMATED: CPT | Performed by: STUDENT IN AN ORGANIZED HEALTH CARE EDUCATION/TRAINING PROGRAM

## 2023-10-22 PROCEDURE — 36415 COLL VENOUS BLD VENIPUNCTURE: CPT | Performed by: FAMILY MEDICINE

## 2023-10-22 PROCEDURE — 25000003 PHARM REV CODE 250: Performed by: STUDENT IN AN ORGANIZED HEALTH CARE EDUCATION/TRAINING PROGRAM

## 2023-10-22 PROCEDURE — 83516 IMMUNOASSAY NONANTIBODY: CPT | Mod: 59 | Performed by: STUDENT IN AN ORGANIZED HEALTH CARE EDUCATION/TRAINING PROGRAM

## 2023-10-22 PROCEDURE — 82043 UR ALBUMIN QUANTITATIVE: CPT | Performed by: STUDENT IN AN ORGANIZED HEALTH CARE EDUCATION/TRAINING PROGRAM

## 2023-10-22 PROCEDURE — 86160 COMPLEMENT ANTIGEN: CPT | Performed by: STUDENT IN AN ORGANIZED HEALTH CARE EDUCATION/TRAINING PROGRAM

## 2023-10-22 PROCEDURE — 86038 ANTINUCLEAR ANTIBODIES: CPT | Performed by: STUDENT IN AN ORGANIZED HEALTH CARE EDUCATION/TRAINING PROGRAM

## 2023-10-22 PROCEDURE — 86160 COMPLEMENT ANTIGEN: CPT | Mod: 59 | Performed by: STUDENT IN AN ORGANIZED HEALTH CARE EDUCATION/TRAINING PROGRAM

## 2023-10-22 PROCEDURE — 84165 PROTEIN E-PHORESIS SERUM: CPT | Mod: 26,,, | Performed by: PATHOLOGY

## 2023-10-22 PROCEDURE — 83970 ASSAY OF PARATHORMONE: CPT | Performed by: STUDENT IN AN ORGANIZED HEALTH CARE EDUCATION/TRAINING PROGRAM

## 2023-10-22 PROCEDURE — 84156 ASSAY OF PROTEIN URINE: CPT | Performed by: STUDENT IN AN ORGANIZED HEALTH CARE EDUCATION/TRAINING PROGRAM

## 2023-10-22 PROCEDURE — 86036 ANCA SCREEN EACH ANTIBODY: CPT | Performed by: STUDENT IN AN ORGANIZED HEALTH CARE EDUCATION/TRAINING PROGRAM

## 2023-10-22 PROCEDURE — 86780 TREPONEMA PALLIDUM: CPT | Performed by: STUDENT IN AN ORGANIZED HEALTH CARE EDUCATION/TRAINING PROGRAM

## 2023-10-22 PROCEDURE — 82306 VITAMIN D 25 HYDROXY: CPT | Performed by: STUDENT IN AN ORGANIZED HEALTH CARE EDUCATION/TRAINING PROGRAM

## 2023-10-22 PROCEDURE — 82272 OCCULT BLD FECES 1-3 TESTS: CPT | Performed by: FAMILY MEDICINE

## 2023-10-22 PROCEDURE — 83521 IG LIGHT CHAINS FREE EACH: CPT | Mod: 59 | Performed by: STUDENT IN AN ORGANIZED HEALTH CARE EDUCATION/TRAINING PROGRAM

## 2023-10-22 PROCEDURE — 36430 TRANSFUSION BLD/BLD COMPNT: CPT

## 2023-10-22 PROCEDURE — 11000001 HC ACUTE MED/SURG PRIVATE ROOM

## 2023-10-22 PROCEDURE — 84165 PROTEIN E-PHORESIS SERUM: CPT | Performed by: STUDENT IN AN ORGANIZED HEALTH CARE EDUCATION/TRAINING PROGRAM

## 2023-10-22 PROCEDURE — 80061 LIPID PANEL: CPT | Performed by: FAMILY MEDICINE

## 2023-10-22 PROCEDURE — 83516 IMMUNOASSAY NONANTIBODY: CPT | Performed by: STUDENT IN AN ORGANIZED HEALTH CARE EDUCATION/TRAINING PROGRAM

## 2023-10-22 PROCEDURE — 86920 COMPATIBILITY TEST SPIN: CPT | Performed by: FAMILY MEDICINE

## 2023-10-22 PROCEDURE — 86334 IMMUNOFIX E-PHORESIS SERUM: CPT | Performed by: STUDENT IN AN ORGANIZED HEALTH CARE EDUCATION/TRAINING PROGRAM

## 2023-10-22 PROCEDURE — 86334 IMMUNOFIX E-PHORESIS SERUM: CPT | Mod: 26,,, | Performed by: PATHOLOGY

## 2023-10-22 PROCEDURE — 86140 C-REACTIVE PROTEIN: CPT | Performed by: STUDENT IN AN ORGANIZED HEALTH CARE EDUCATION/TRAINING PROGRAM

## 2023-10-22 RX ORDER — HYDROCODONE BITARTRATE AND ACETAMINOPHEN 500; 5 MG/1; MG/1
TABLET ORAL
Status: DISCONTINUED | OUTPATIENT
Start: 2023-10-22 | End: 2023-10-25 | Stop reason: HOSPADM

## 2023-10-22 RX ORDER — SODIUM BICARBONATE 650 MG/1
650 TABLET ORAL 3 TIMES DAILY
Status: DISCONTINUED | OUTPATIENT
Start: 2023-10-22 | End: 2023-10-24

## 2023-10-22 RX ADMIN — SODIUM BICARBONATE 650 MG: 650 TABLET ORAL at 02:10

## 2023-10-22 RX ADMIN — HYDRALAZINE HYDROCHLORIDE 50 MG: 25 TABLET, FILM COATED ORAL at 02:10

## 2023-10-22 RX ADMIN — CARVEDILOL 25 MG: 25 TABLET, FILM COATED ORAL at 10:10

## 2023-10-22 RX ADMIN — GUAIFENESIN 200 MG: 200 SOLUTION ORAL at 10:10

## 2023-10-22 RX ADMIN — SODIUM BICARBONATE 650 MG: 650 TABLET ORAL at 10:10

## 2023-10-22 RX ADMIN — SODIUM ZIRCONIUM CYCLOSILICATE 10 G: 5 POWDER, FOR SUSPENSION ORAL at 08:10

## 2023-10-22 RX ADMIN — AMLODIPINE BESYLATE 10 MG: 5 TABLET ORAL at 08:10

## 2023-10-22 RX ADMIN — HYDRALAZINE HYDROCHLORIDE 50 MG: 25 TABLET, FILM COATED ORAL at 10:10

## 2023-10-22 RX ADMIN — MUPIROCIN: 20 OINTMENT TOPICAL at 08:10

## 2023-10-22 RX ADMIN — HYDRALAZINE HYDROCHLORIDE 50 MG: 25 TABLET, FILM COATED ORAL at 06:10

## 2023-10-22 RX ADMIN — CARVEDILOL 25 MG: 25 TABLET, FILM COATED ORAL at 08:10

## 2023-10-22 RX ADMIN — MUPIROCIN: 20 OINTMENT TOPICAL at 10:10

## 2023-10-22 NOTE — PLAN OF CARE
Plan of care reviewed with patient, pt voiced understanding. NSR on monitor. No acute distress noted. Side rails x2, bed in lowest position, call bell within reach, pt advised to call for assistance. Maintained bed alarm for pt safety. Wife at bedside.

## 2023-10-22 NOTE — CONSULTS
"[facility]    CC: anemia    HPI 53 y.o. male severe obesity (BMI 37), HTN, DM was transferred from [facility] to [facility] for CKD, HTN emergency and severe anemia. GI consulted for anemia.    Patient in normal state of health until a week ago started noticing blurry vision. Was seen by PCP found to have abnormal labs and severe HTN (sBP 229), hgb 6.5, Cr 11. He was treated at [facility] ED for HTN emergency with cardine drip and given 1u prbc. Transferred to [facility] for nephrology evaluation.     No overt GI bleeding ever. He has regular BM/s every day that are soft and brown. Has some constipation over the past 48 hours. Given bentyl recently for abdominal pain which helps. Never had colonoscopy. Denies any NSAID use. No FDR with hx of CRC. He specifically denies heartburn or epigastric pain. Abdominal pain is LLQ and not associated with meals.    He received 2 u pRBC today. Follow up hgb pending. CTAP shows no abdominal abnormality. He continues to make plenty of urine. Never on dialysis. HD stable    Past Medical History  Past Medical History:   Diagnosis Date    CKD (chronic kidney disease), stage V 10/21/2023    Diabetes mellitus     Hypertension     Urinary tract infection          Review of Systems  As per HPI    Physical Examination  BP (!) 156/76 (BP Location: Left arm, Patient Position: Lying)   Pulse 66   Temp 98 °F (36.7 °C) (Oral)   Resp 16   Ht 5' 7" (1.702 m)   Wt 109.3 kg (241 lb)   SpO2 99%   BMI 37.75 kg/m²   General appearance: alert, cooperative, no distress  HENT: Normocephalic, atraumatic. Anicteric sclera. No sublingual jaundice.  Lungs:  Normal work of breathing  Abdomen: Soft, non-tender; bowel sounds normoactive; no organomegaly  Skin: No jaundice  Extremities:  No edema  Neurologic: Alert and oriented    Labs: Hgb 6.4, Cr 11    Imaging: CTAP without abdominal abnormality. GB absent. Biliary ducts not distended. Pancreas normal. Some stool burden    I " have personally reviewed and interpreted these images.    Assessment: 53 y.o. male severe obesity (BMI 37), HTN, DM was transferred from [facility] to [facility] for CKD, HTN emergency and severe anemia. GI consulted for anemia.    Anemia  LLQ abdominal pain  Constipation  Found to have HTN emergency (sBP 229) when establishing with PCP with Cr 11, and hgb 6.5 s/p 1U pRBC at OSH. Reassuring UOP. Transferred to [facility] for Nephro evaluation. No signs of GI bleeding. CTAP with normal biliary tree, absent GB, mild stool burden; no abnomional abnormality. Suspect anemia is 2/2 AoCD in setting of acute on chronic kidney disease vs impending renal failure. No history of CRC screening. No FDR with CRC. Does not take NSAIDs.    Recommending:  - agree with Nephrology consult  - Maintain active type and screen  - Transfuse for hgb goal >7  - s/p 1U pRBC at OSH, with additional 2U today  - Miralax scheduled daily for constipation  - Needs regular average risk CRC screening on discharge  - EGD/Colon as outpatient vs inpatient depending on clinical course    Discussed with Dr. Awais Fry MD  Gastroenterology Fellow - [employer]

## 2023-10-22 NOTE — ASSESSMENT & PLAN NOTE
-notion of eye injection monthly with blurry vision  -will consult ophthalmology or outpatient fu

## 2023-10-22 NOTE — PROGRESS NOTES
[facility]  Progress Note    Patient Name: Wai Bain  MRN: [id number]  Patient Class: IP- Inpatient   Admission Date: 10/21/2023  Length of Stay: 1 days  Attending Physician: Mame Booker*  Primary Care Provider: Abhi Peralta MD        Subjective:     Principal Problem:<principal problem not specified>        HPI:  52 YO M morbidly obese, HTN, DM was transferred from [facility] to [facility] for severe CKD, HTN urgency and severe anemia. Per wife for the past 4 months patient did not have insurance and could not afford his medications. He recently had his insurance back and went to see his PCP, labs work done which was abnormal and was called to go to [facility] ED. Then in [facility] ED BP was found to be systolic >229, labs with Hg 6.5. he stated due to his diabetes he was having blurry vision and had scheduled monthly injection which also he missed for the past few months. Per wife he started to have kidney problem four months ago and has not been follow up with nephrologist since then. When seen with notion of dry cough, reflux acid, associated with epigastric pain. Denies typical CP or SOB, no palpitation. In [facility] ED he was placed on cardene drip and received one unit of PRBC.       Overview/Hospital Course:  No notes on file    Interval History:  Awake and alert,   Hyperkalemia-shift potassium with Lokelma   H/H down trending transfuse to keep hemoglobin > 7. Hold anticoagulant, stool occult blood test pending  Worsening renal function-strict input output and await Nephrology recs     Review of Systems   Constitutional:  Negative for activity change, appetite change and chills.   Respiratory:  Negative for cough, chest tightness, shortness of breath and wheezing.    Cardiovascular:  Negative for chest pain, palpitations and leg swelling.   Gastrointestinal:  Negative for abdominal pain, constipation, diarrhea, nausea and vomiting.    Musculoskeletal:  Negative for back pain and gait problem.   Neurological:  Negative for dizziness, weakness and headaches.   Psychiatric/Behavioral:  Negative for agitation, confusion and hallucinations.    All other systems reviewed and are negative.    Objective:     Vital Signs (Most Recent):  Temp: 96.1 °F (35.6 °C) (10/22/23 0441)  Pulse: 63 (10/22/23 0441)  Resp: 20 (10/22/23 0441)  BP: (!) 151/70 (10/22/23 0441)  SpO2: 99 % (10/22/23 0441) Vital Signs (24h Range):  Temp:  [96.1 °F (35.6 °C)-98.5 °F (36.9 °C)] 96.1 °F (35.6 °C)  Pulse:  [63-73] 63  Resp:  [16-29] 20  SpO2:  [96 %-99 %] 99 %  BP: (131-179)/(63-84) 151/70     Weight: 109.3 kg (241 lb)  Body mass index is 37.75 kg/m².    Intake/Output Summary (Last 24 hours) at 10/22/2023 0709  Last data filed at 10/22/2023 0648  Gross per 24 hour   Intake 600 ml   Output 850 ml   Net -250 ml         Physical Exam  Vitals and nursing note reviewed.   Constitutional:       General: He is not in acute distress.     Appearance: He is not toxic-appearing.   HENT:      Head: Normocephalic and atraumatic.   Cardiovascular:      Rate and Rhythm: Normal rate and regular rhythm.      Heart sounds:      No friction rub. No gallop.   Pulmonary:      Effort: Pulmonary effort is normal. No respiratory distress.      Breath sounds: No wheezing or rales.   Chest:      Chest wall: No tenderness.   Abdominal:      General: Bowel sounds are normal. There is no distension.      Tenderness: There is no abdominal tenderness. There is no guarding or rebound.   Musculoskeletal:         General: No swelling or tenderness.      Cervical back: No rigidity or tenderness.      Right lower leg: No edema.      Left lower leg: No edema.   Skin:     Findings: No erythema or rash.   Neurological:      General: No focal deficit present.      Mental Status: He is alert and oriented to person, place, and time.      Cranial Nerves: No cranial nerve deficit.      Motor: No weakness.       "Coordination: Coordination normal.      Gait: Gait normal.   Psychiatric:         Thought Content: Thought content normal.             Significant Labs: A1C:   Recent Labs   Lab 10/21/23  0433   HGBA1C 5.6     ABGs: No results for input(s): "PH", "PCO2", "HCO3", "POCSATURATED", "BE", "TOTALHB", "COHB", "METHB", "O2HB", "POCFIO2", "PO2" in the last 48 hours.  Blood Culture:   Recent Labs   Lab 10/21/23  0756   LABBLOO No Growth to date  No Growth to date     CBC:   Recent Labs   Lab 10/21/23  0309 10/21/23  0310 10/22/23  0402   WBC 11.30 11.27 9.22   HGB 6.9* 6.9* 6.4*   HCT 21.4* 21.3* 19.6*    233 223     CMP:   Recent Labs   Lab 10/20/23  1609 10/21/23  0309 10/22/23  0402    141 139   K 5.2* 4.9 5.5*    110 111*   CO2 16* 14* 15*    162* 124*   BUN 84* 92* 96*   CREATININE 10.49* 10.6* 11.2*   CALCIUM 7.4* 7.9* 8.2*   PROT 7.2 6.3 6.1   ALBUMIN 3.5 2.4* 2.4*   BILITOT 0.4 0.4 0.4   ALKPHOS 67 54* 52*   AST 23 13 14   ALT 19 11 12   ANIONGAP 17* 17* 13     Lactic Acid: No results for input(s): "LACTATE" in the last 48 hours.  Lipase: No results for input(s): "LIPASE" in the last 48 hours.  Lipid Panel:   Recent Labs   Lab 10/22/23  0402   CHOL 132   HDL 19*   LDLCALC 86.0   TRIG 135   CHOLHDL 14.4*     Magnesium:   Recent Labs   Lab 10/20/23  1609 10/21/23  0309   MG 1.9 1.8     Troponin: No results for input(s): "TROPONINI", "TROPONINIHS" in the last 48 hours.  TSH:   Recent Labs   Lab 10/21/23  0433   TSH 1.153     Urine Culture: No results for input(s): "LABURIN" in the last 48 hours.  Urine Studies:   Recent Labs   Lab 10/21/23  0042   COLORU Yellow   APPEARANCEUA Clear   PHUR 6.0   SPECGRAV 1.025   PROTEINUA 3+*   GLUCUA 1+*   KETONESU Negative   BILIRUBINUA Negative   OCCULTUA 1+*   NITRITE Negative   UROBILINOGEN Negative   LEUKOCYTESUR Negative   RBCUA 7*   WBCUA 6*   BACTERIA Many*   SQUAMEPITHEL 6   HYALINECASTS 0       Significant Imaging: I have reviewed all pertinent " imaging results/findings within the past 24 hours.      Assessment/Plan:      Hyperkalemia  Shift       Severe obesity (BMI >= 40)  Body mass index is 37.75 kg/m². Morbid obesity complicates all aspects of disease management from diagnostic modalities to treatment. Weight loss encouraged and health benefits explained to patient.         Cough in adult  -cont monitor  -will give robitussin      CKD (chronic kidney disease), stage V  -likely ESRD  -consult nephrology and F/U recommendation      Anemia due to chronic kidney disease  -S/P one unit of PRBC, repeat H/H  - consent signed  -monitor H/H and monitor for bleeding  -will get anemia profile      Hypertensive retinopathy, bilateral  -notion of eye injection monthly with blurry vision  -will consult ophthalmology or outpatient fu      Microscopic hematuria  -persist  -defer to urology Vs nephrology       Essential hypertension  -resume home meds with carvidelol and add amlodipine      Type 2 diabetes mellitus with hyperglycemia, without long-term current use of insulin  -monitor FS and start ISS  - A1C 5.6        VTE Risk Mitigation (From admission, onward)         Ordered     IP VTE HIGH RISK PATIENT  Once         10/21/23 0245     Place sequential compression device  Until discontinued         10/21/23 0245                Discharge Planning   [place]:      Code Status: Full Code   Is the patient medically ready for discharge?:     Reason for patient still in hospital (select all that apply): Patient trending condition                     Mame N MD Ade  Department of Hospital Medicine   [facility]

## 2023-10-22 NOTE — ASSESSMENT & PLAN NOTE
Body mass index is 37.75 kg/m². Morbid obesity complicates all aspects of disease management from diagnostic modalities to treatment. Weight loss encouraged and health benefits explained to patient.

## 2023-10-22 NOTE — ASSESSMENT & PLAN NOTE
-S/P one unit of PRBC, repeat H/H  - consent signed  -monitor H/H and monitor for bleeding  -will get anemia profile

## 2023-10-22 NOTE — CONSULTS
Nephrology Consult  H&P      Consult Requested By: Mame Booker*  Reason for Consult: NESTOR on CKD5     SUBJECTIVE:     History of Present Illness:  Wai Bain is a 53 y.o.   male who  has a past medical history of CKD (chronic kidney disease), stage V (10/21/2023), Diabetes mellitus, Hypertension, and Urinary tract infection.. The patient presented to the \[facility]\"" on 10/21/2023  per his PCP low Hb on presentation HTN, Cr up, reports that he was out of his meds. Hx of DM2 with retinopathy, uncontrolled  HTN both 10 + years   ?    Review of Systems   Constitutional:  Negative for chills and fever.   HENT:  Negative for congestion and sore throat.    Eyes:  Negative for blurred vision, double vision and photophobia.   Respiratory:  Negative for cough and shortness of breath.    Cardiovascular:  Negative for chest pain, palpitations and leg swelling.   Gastrointestinal:  Negative for abdominal pain, diarrhea, nausea and vomiting.   Genitourinary:  Negative for dysuria and urgency.   Musculoskeletal:  Negative for joint pain and myalgias.   Skin:  Negative for itching and rash.   Neurological:  Negative for dizziness, sensory change, weakness and headaches.   Endo/Heme/Allergies:  Negative for polydipsia. Does not bruise/bleed easily.   Psychiatric/Behavioral:  Negative for depression.        Past Medical History:   Diagnosis Date    CKD (chronic kidney disease), stage V 10/21/2023    Diabetes mellitus     Hypertension     Urinary tract infection      Past Surgical History:   Procedure Laterality Date    ESOPHAGOGASTRODUODENOSCOPY N/A 3/13/2020    Procedure: EGD (ESOPHAGOGASTRODUODENOSCOPY);  Surgeon: Katiuska العراقي MD;  Location: [facility] ENDO;  Service: Endoscopy;  Laterality: N/A;    LAPAROSCOPIC CHOLECYSTECTOMY N/A 6/22/2020    Procedure: CHOLECYSTECTOMY, LAPAROSCOPIC;  Surgeon: Dayron Conner MD;  Location: [facility] OR;  Service: General;  Laterality: N/A;     Family History   Problem  "Relation Age of Onset    Prostate cancer Neg Hx     Kidney disease Neg Hx      Social History     Tobacco Use    Smoking status: Former     Types: Cigarettes    Smokeless tobacco: Never    Tobacco comments:     quit "long time ago"   Substance Use Topics    Alcohol use: Yes     Alcohol/week: 1.0 standard drink of alcohol     Types: 1 Cans of beer per week     Comment: social    Drug use: Never       Review of patient's allergies indicates:  No Known Allergies         OBJECTIVE:     Vital Signs (Most Recent)  Vitals:    10/22/23 0852 10/22/23 0907 10/22/23 1108 10/22/23 1137   BP: (!) 158/73  (!) 146/72 (!) 141/67   BP Location: Right arm  Right arm Right arm   Patient Position: Lying  Lying Lying   Pulse: 64  67 66   Resp: 18  18 18   Temp: 97.9 °F (36.6 °C)  97.8 °F (36.6 °C) 97.8 °F (36.6 °C)   TempSrc: Oral  Oral Oral   SpO2: 99% 99% 98% 98%   Weight:       Height:             Date 10/22/23 0700 - 10/23/23 0659   Shift 9242-3957 2649-2277 3033-3633 24 Hour Total   INTAKE   Shift Total(mL/kg)       OUTPUT   Urine(mL/kg/hr) 400   400   Shift Total(mL/kg) 400(3.7)   400(3.7)   Weight (kg) 109.3 109.3 109.3 109.3           Medications:   amLODIPine  10 mg Oral Daily    carvediloL  25 mg Oral BID    heparin (porcine)  5,000 Units Subcutaneous Q8H    hydrALAZINE  50 mg Oral Q8H    mupirocin   Nasal BID           Physical Exam  Vitals and nursing note reviewed.   Constitutional:       General: He is not in acute distress.     Appearance: He is obese. He is not diaphoretic.   HENT:      Head: Normocephalic and atraumatic.      Mouth/Throat:      Pharynx: No oropharyngeal exudate.   Eyes:      General: No scleral icterus.     Conjunctiva/sclera: Conjunctivae normal.      Pupils: Pupils are equal, round, and reactive to light.   Cardiovascular:      Rate and Rhythm: Normal rate and regular rhythm.      Heart sounds: Normal heart sounds. No murmur heard.  Pulmonary:      Effort: Pulmonary effort is normal. No respiratory " distress.      Breath sounds: Normal breath sounds.   Abdominal:      General: Bowel sounds are normal. There is no distension.      Palpations: Abdomen is soft.      Tenderness: There is no abdominal tenderness.   Musculoskeletal:         General: Normal range of motion.      Cervical back: Normal range of motion and neck supple.   Skin:     General: Skin is warm and dry.      Findings: No erythema.   Neurological:      Mental Status: He is alert and oriented to person, place, and time.      Cranial Nerves: No cranial nerve deficit.   Psychiatric:         Mood and Affect: Affect normal.         Cognition and Memory: Memory normal.         Judgment: Judgment normal.         Laboratory:  Recent Labs   Lab 10/20/23  1609 10/21/23  0309 10/21/23  0310 10/22/23  0402   WBC 9.23 11.30 11.27 9.22   HGB 6.5* 6.9* 6.9* 6.4*   HCT 20.0* 21.4* 21.3* 19.6*    249 233 223   MONO 8.5  0.8  --   --   --    EOSINOPHIL 3.9  --   --   --      Recent Labs   Lab 10/20/23  1609 10/21/23  0309 10/22/23  0402    141 139   K 5.2* 4.9 5.5*    110 111*   CO2 16* 14* 15*   BUN 84* 92* 96*   CREATININE 10.49* 10.6* 11.2*   CALCIUM 7.4* 7.9* 8.2*   PHOS  --  8.4*  --        Diagnostic Results:  X-Ray: Reviewed  US: Reviewed  Echo: Reviewed  ASSESSMENT/PLAN:     1. NESTOR - ? CKD5 Cr 2020 was 0.9 3.23 Cr up to 4.4  no Kidney biopsy was performed at that time  Now Cr 11.2   -- No need for Kidney biopsy at this time - less likely would be informative.  -- as of now No Indication for RRT at this time has blood loss ? GIB  No Uremia symptoms.  -- US kidney - No hydronephrosis.   UA + 3 prot get UPCR, Many bacteria would treat   Has lungs changes get work up with : MICHAEL, C3/C4, CH50, ANCA, MPO, PR3, ESR, CRP, RF, RPR,   SPEP, UPEP,   serum free light chains  - however may be obscured by blood transfusion   -- Daily Renal Function Panel  -- Avoid Hypotension.  -- Renally dose all meds  -- Please avoid nephrotoxins, including NSAIDs,  "aminoglycosides, IV contrast (unless absolutely necessary), gadolinium, fleets and other phosphorous-based laxatives. Caution with antibiotics.    Hyperkalemia   -- Low K+ diet   -- Lokelma 10 daily     2. HTN (I10) -  controlled   3. Anemia of chronic kidney disease   -- Transfuse <7   Recent Labs   Lab 10/21/23  0309 10/21/23  0310 10/22/23  0402   HGB 6.9* 6.9* 6.4*   HCT 21.4* 21.3* 19.6*    233 223       Iron   Lab Results   Component Value Date    IRON 28 (L) 10/21/2023    TIBC 247 (L) 10/21/2023    FERRITIN 433 (H) 10/21/2023       4. MBD (E88.9 M90.80) - check PTh Vit D   Recent Labs   Lab 10/21/23  0309 10/22/23  0402   CALCIUM 7.9* 8.2*   PHOS 8.4*  --      Recent Labs   Lab 10/20/23  1609 10/21/23  0309   MG 1.9 1.8       Lab Results   Component Value Date    CALCIUM 8.2 (L) 10/22/2023    PHOS 8.4 (H) 10/21/2023     No results found for: "NDILFIXP94UA"  Acidosis - start PO bicarbonate   Lab Results   Component Value Date    CO2 15 (L) 10/22/2023       5. Nutrition/Hypoalbuminemia   Recent Labs   Lab 10/21/23  0309 10/22/23  0402   ALBUMIN 2.4* 2.4*     -- check UPCR       Thank you for allowing me to participate in care of your patient  With any question please call   Glenn Mesa MD     [facility]  MUKUL Cabrera MD,   OMD MANDA Bahena MD E. V. Harmon, NP  [address]   [place], [address] [phone]  After hours answering service: [phone]   "

## 2023-10-22 NOTE — SUBJECTIVE & OBJECTIVE
Interval History:  Awake and alert,   Hyperkalemia-shift potassium with Lokelma   H/H down trending transfuse to keep hemoglobin > 7. Hold anticoagulant, stool occult blood test pending  Worsening renal function-strict input output and await Nephrology recs     Review of Systems   Constitutional:  Negative for activity change, appetite change and chills.   Respiratory:  Negative for cough, chest tightness, shortness of breath and wheezing.    Cardiovascular:  Negative for chest pain, palpitations and leg swelling.   Gastrointestinal:  Negative for abdominal pain, constipation, diarrhea, nausea and vomiting.   Musculoskeletal:  Negative for back pain and gait problem.   Neurological:  Negative for dizziness, weakness and headaches.   Psychiatric/Behavioral:  Negative for agitation, confusion and hallucinations.    All other systems reviewed and are negative.    Objective:     Vital Signs (Most Recent):  Temp: 96.1 °F (35.6 °C) (10/22/23 0441)  Pulse: 63 (10/22/23 0441)  Resp: 20 (10/22/23 0441)  BP: (!) 151/70 (10/22/23 0441)  SpO2: 99 % (10/22/23 0441) Vital Signs (24h Range):  Temp:  [96.1 °F (35.6 °C)-98.5 °F (36.9 °C)] 96.1 °F (35.6 °C)  Pulse:  [63-73] 63  Resp:  [16-29] 20  SpO2:  [96 %-99 %] 99 %  BP: (131-179)/(63-84) 151/70     Weight: 109.3 kg (241 lb)  Body mass index is 37.75 kg/m².    Intake/Output Summary (Last 24 hours) at 10/22/2023 0709  Last data filed at 10/22/2023 0648  Gross per 24 hour   Intake 600 ml   Output 850 ml   Net -250 ml         Physical Exam  Vitals and nursing note reviewed.   Constitutional:       General: He is not in acute distress.     Appearance: He is not toxic-appearing.   HENT:      Head: Normocephalic and atraumatic.   Cardiovascular:      Rate and Rhythm: Normal rate and regular rhythm.      Heart sounds:      No friction rub. No gallop.   Pulmonary:      Effort: Pulmonary effort is normal. No respiratory distress.      Breath sounds: No wheezing or rales.   Chest:       "Chest wall: No tenderness.   Abdominal:      General: Bowel sounds are normal. There is no distension.      Tenderness: There is no abdominal tenderness. There is no guarding or rebound.   Musculoskeletal:         General: No swelling or tenderness.      Cervical back: No rigidity or tenderness.      Right lower leg: No edema.      Left lower leg: No edema.   Skin:     Findings: No erythema or rash.   Neurological:      General: No focal deficit present.      Mental Status: He is alert and oriented to person, place, and time.      Cranial Nerves: No cranial nerve deficit.      Motor: No weakness.      Coordination: Coordination normal.      Gait: Gait normal.   Psychiatric:         Thought Content: Thought content normal.             Significant Labs: A1C:   Recent Labs   Lab 10/21/23  0433   HGBA1C 5.6     ABGs: No results for input(s): "PH", "PCO2", "HCO3", "POCSATURATED", "BE", "TOTALHB", "COHB", "METHB", "O2HB", "POCFIO2", "PO2" in the last 48 hours.  Blood Culture:   Recent Labs   Lab 10/21/23  0756   LABBLOO No Growth to date  No Growth to date     CBC:   Recent Labs   Lab 10/21/23  0309 10/21/23  0310 10/22/23  0402   WBC 11.30 11.27 9.22   HGB 6.9* 6.9* 6.4*   HCT 21.4* 21.3* 19.6*    233 223     CMP:   Recent Labs   Lab 10/20/23  1609 10/21/23  0309 10/22/23  0402    141 139   K 5.2* 4.9 5.5*    110 111*   CO2 16* 14* 15*    162* 124*   BUN 84* 92* 96*   CREATININE 10.49* 10.6* 11.2*   CALCIUM 7.4* 7.9* 8.2*   PROT 7.2 6.3 6.1   ALBUMIN 3.5 2.4* 2.4*   BILITOT 0.4 0.4 0.4   ALKPHOS 67 54* 52*   AST 23 13 14   ALT 19 11 12   ANIONGAP 17* 17* 13     Lactic Acid: No results for input(s): "LACTATE" in the last 48 hours.  Lipase: No results for input(s): "LIPASE" in the last 48 hours.  Lipid Panel:   Recent Labs   Lab 10/22/23  0402   CHOL 132   HDL 19*   LDLCALC 86.0   TRIG 135   CHOLHDL 14.4*     Magnesium:   Recent Labs   Lab 10/20/23  1609 10/21/23  0309   MG 1.9 1.8     Troponin: " "No results for input(s): "TROPONINI", "TROPONINIHS" in the last 48 hours.  TSH:   Recent Labs   Lab 10/21/23  0433   TSH 1.153     Urine Culture: No results for input(s): "LABURIN" in the last 48 hours.  Urine Studies:   Recent Labs   Lab 10/21/23  0042   COLORU Yellow   APPEARANCEUA Clear   PHUR 6.0   SPECGRAV 1.025   PROTEINUA 3+*   GLUCUA 1+*   KETONESU Negative   BILIRUBINUA Negative   OCCULTUA 1+*   NITRITE Negative   UROBILINOGEN Negative   LEUKOCYTESUR Negative   RBCUA 7*   WBCUA 6*   BACTERIA Many*   SQUAMEPITHEL 6   HYALINECASTS 0       Significant Imaging: I have reviewed all pertinent imaging results/findings within the past 24 hours.  "

## 2023-10-23 LAB
ALBUMIN SERPL BCP-MCNC: 2.4 G/DL (ref 3.5–5.2)
ALP SERPL-CCNC: 54 U/L (ref 55–135)
ALT SERPL W/O P-5'-P-CCNC: 10 U/L (ref 10–44)
ANA SER QL IF: NORMAL
ANION GAP SERPL CALC-SCNC: 15 MMOL/L (ref 8–16)
AST SERPL-CCNC: 13 U/L (ref 10–40)
BILIRUB SERPL-MCNC: 0.5 MG/DL (ref 0.1–1)
BUN SERPL-MCNC: 104 MG/DL (ref 6–20)
CALCIUM SERPL-MCNC: 8 MG/DL (ref 8.7–10.5)
CHLORIDE SERPL-SCNC: 108 MMOL/L (ref 95–110)
CO2 SERPL-SCNC: 15 MMOL/L (ref 23–29)
CREAT SERPL-MCNC: 11.3 MG/DL (ref 0.5–1.4)
ERYTHROCYTE [DISTWIDTH] IN BLOOD BY AUTOMATED COUNT: 13.2 % (ref 11.5–14.5)
EST. GFR  (NO RACE VARIABLE): 5 ML/MIN/1.73 M^2
GLUCOSE SERPL-MCNC: 110 MG/DL (ref 70–110)
HCT VFR BLD AUTO: 23.8 % (ref 40–54)
HETEROPH AB SERPL QL IA: NEGATIVE
HGB BLD-MCNC: 7.7 G/DL (ref 14–18)
MCH RBC QN AUTO: 26.9 PG (ref 27–31)
MCHC RBC AUTO-ENTMCNC: 32.4 G/DL (ref 32–36)
MCV RBC AUTO: 83 FL (ref 82–98)
PLATELET # BLD AUTO: 234 K/UL (ref 150–450)
PMV BLD AUTO: 8.8 FL (ref 9.2–12.9)
POCT GLUCOSE: 109 MG/DL (ref 70–110)
POCT GLUCOSE: 114 MG/DL (ref 70–110)
POCT GLUCOSE: 125 MG/DL (ref 70–110)
POCT GLUCOSE: 147 MG/DL (ref 70–110)
POCT GLUCOSE: 187 MG/DL (ref 70–110)
POTASSIUM SERPL-SCNC: 5.3 MMOL/L (ref 3.5–5.1)
PROT 24H UR-MRATE: 5181 MG/SPEC (ref 0–100)
PROT SERPL-MCNC: 5.9 G/DL (ref 6–8.4)
PROT UR-MCNC: 391 MG/DL (ref 0–15)
RBC # BLD AUTO: 2.86 M/UL (ref 4.6–6.2)
SODIUM SERPL-SCNC: 138 MMOL/L (ref 136–145)
T PALLIDUM AB SER QL IF: NORMAL
URINE COLLECTION DURATION: 24 HR
URINE VOLUME: 1325 ML
WBC # BLD AUTO: 10.07 K/UL (ref 3.9–12.7)

## 2023-10-23 PROCEDURE — 84166 PROTEIN E-PHORESIS/URINE/CSF: CPT | Performed by: STUDENT IN AN ORGANIZED HEALTH CARE EDUCATION/TRAINING PROGRAM

## 2023-10-23 PROCEDURE — 99222 PR INITIAL HOSPITAL CARE,LEVL II: ICD-10-PCS | Mod: ,,, | Performed by: INTERNAL MEDICINE

## 2023-10-23 PROCEDURE — 94761 N-INVAS EAR/PLS OXIMETRY MLT: CPT

## 2023-10-23 PROCEDURE — 25000003 PHARM REV CODE 250: Performed by: STUDENT IN AN ORGANIZED HEALTH CARE EDUCATION/TRAINING PROGRAM

## 2023-10-23 PROCEDURE — 85027 COMPLETE CBC AUTOMATED: CPT | Performed by: FAMILY MEDICINE

## 2023-10-23 PROCEDURE — 25000003 PHARM REV CODE 250: Performed by: FAMILY MEDICINE

## 2023-10-23 PROCEDURE — 99222 1ST HOSP IP/OBS MODERATE 55: CPT | Mod: ,,, | Performed by: INTERNAL MEDICINE

## 2023-10-23 PROCEDURE — 80053 COMPREHEN METABOLIC PANEL: CPT | Performed by: FAMILY MEDICINE

## 2023-10-23 PROCEDURE — 84166 PROTEIN E-PHORESIS/URINE/CSF: CPT | Mod: 26,,, | Performed by: PATHOLOGY

## 2023-10-23 PROCEDURE — 21400001 HC TELEMETRY ROOM

## 2023-10-23 PROCEDURE — 86308 HETEROPHILE ANTIBODY SCREEN: CPT | Performed by: FAMILY MEDICINE

## 2023-10-23 PROCEDURE — 87389 HIV-1 AG W/HIV-1&-2 AB AG IA: CPT | Performed by: FAMILY MEDICINE

## 2023-10-23 PROCEDURE — 86592 SYPHILIS TEST NON-TREP QUAL: CPT | Performed by: FAMILY MEDICINE

## 2023-10-23 PROCEDURE — 36415 COLL VENOUS BLD VENIPUNCTURE: CPT | Performed by: FAMILY MEDICINE

## 2023-10-23 PROCEDURE — 84156 ASSAY OF PROTEIN URINE: CPT | Performed by: STUDENT IN AN ORGANIZED HEALTH CARE EDUCATION/TRAINING PROGRAM

## 2023-10-23 PROCEDURE — 84166 PATHOLOGIST INTERPRETATION UPE: ICD-10-PCS | Mod: 26,,, | Performed by: PATHOLOGY

## 2023-10-23 RX ORDER — CALCIUM ACETATE 667 MG/1
667 CAPSULE ORAL
Status: DISCONTINUED | OUTPATIENT
Start: 2023-10-23 | End: 2023-10-24

## 2023-10-23 RX ORDER — ERGOCALCIFEROL 1.25 MG/1
50000 CAPSULE ORAL
Status: DISCONTINUED | OUTPATIENT
Start: 2023-10-23 | End: 2023-10-25 | Stop reason: HOSPADM

## 2023-10-23 RX ADMIN — GUAIFENESIN 200 MG: 200 SOLUTION ORAL at 09:10

## 2023-10-23 RX ADMIN — CARVEDILOL 25 MG: 25 TABLET, FILM COATED ORAL at 09:10

## 2023-10-23 RX ADMIN — HYDRALAZINE HYDROCHLORIDE 50 MG: 25 TABLET, FILM COATED ORAL at 02:10

## 2023-10-23 RX ADMIN — SODIUM BICARBONATE 650 MG: 650 TABLET ORAL at 08:10

## 2023-10-23 RX ADMIN — HYDRALAZINE HYDROCHLORIDE 50 MG: 25 TABLET, FILM COATED ORAL at 09:10

## 2023-10-23 RX ADMIN — CARVEDILOL 25 MG: 25 TABLET, FILM COATED ORAL at 08:10

## 2023-10-23 RX ADMIN — AMLODIPINE BESYLATE 10 MG: 5 TABLET ORAL at 08:10

## 2023-10-23 RX ADMIN — ERGOCALCIFEROL 50000 UNITS: 1.25 CAPSULE ORAL at 11:10

## 2023-10-23 RX ADMIN — MUPIROCIN: 20 OINTMENT TOPICAL at 08:10

## 2023-10-23 RX ADMIN — SODIUM BICARBONATE 650 MG: 650 TABLET ORAL at 09:10

## 2023-10-23 RX ADMIN — HYDRALAZINE HYDROCHLORIDE 50 MG: 25 TABLET, FILM COATED ORAL at 05:10

## 2023-10-23 RX ADMIN — CALCIUM ACETATE 667 MG: 667 CAPSULE ORAL at 11:10

## 2023-10-23 RX ADMIN — SODIUM BICARBONATE 650 MG: 650 TABLET ORAL at 02:10

## 2023-10-23 RX ADMIN — CALCIUM ACETATE 667 MG: 667 CAPSULE ORAL at 04:10

## 2023-10-23 RX ADMIN — MUPIROCIN: 20 OINTMENT TOPICAL at 09:10

## 2023-10-23 RX ADMIN — SODIUM ZIRCONIUM CYCLOSILICATE 10 G: 5 POWDER, FOR SUSPENSION ORAL at 11:10

## 2023-10-23 NOTE — PROGRESS NOTES
[facility] Medicine  Progress Note    Patient Name: Wai Bain  MRN: [id number]  Patient Class: IP- Inpatient   Admission Date: 10/21/2023  Length of Stay: 2 days  Attending Physician: Mame Booker*  Primary Care Provider: Abhi Peralta MD        Subjective:     Principal Problem:CKD (chronic kidney disease), stage V        HPI:  54 YO M morbidly obese, HTN, DM was transferred from [facility] to [facility] for severe CKD, HTN urgency and severe anemia. Per wife for the past 4 months patient did not have insurance and could not afford his medications. He recently had his insurance back and went to see his PCP, labs work done which was abnormal and was called to go to [facility] ED. Then in [facility] ED BP was found to be systolic >229, labs with Hg 6.5. he stated due to his diabetes he was having blurry vision and had scheduled monthly injection which also he missed for the past few months. Per wife he started to have kidney problem four months ago and has not been follow up with nephrologist since then. When seen with notion of dry cough, reflux acid, associated with epigastric pain. Denies typical CP or SOB, no palpitation. In [facility] ED he was placed on cardene drip and received one unit of PRBC.       Overview/Hospital Course:  No notes on file    Interval History:  Awake and alert, updated patient and spouse on result  Hyperkalemia-shift potassium with Lokelma   H/H stable.   Stool occult blood negative.   Worsening renal function-strict input/ output   Appreciates Nephrology recs   Consult ID and pulmonology    Review of Systems   Constitutional:  Negative for activity change, appetite change and chills.   Respiratory:  Negative for cough, chest tightness, shortness of breath and wheezing.    Cardiovascular:  Negative for chest pain, palpitations and leg swelling.   Gastrointestinal:  Negative for abdominal pain, constipation, diarrhea, nausea and vomiting.    Musculoskeletal:  Negative for back pain and gait problem.   Neurological:  Negative for dizziness, weakness and headaches.   Psychiatric/Behavioral:  Negative for agitation, confusion and hallucinations.    All other systems reviewed and are negative.    Objective:     Vital Signs (Most Recent):  Temp: 97 °F (36.1 °C) (10/23/23 1146)  Pulse: 62 (10/23/23 1232)  Resp: 18 (10/23/23 1146)  BP: 127/63 (10/23/23 1146)  SpO2: 95 % (10/23/23 1146) Vital Signs (24h Range):  Temp:  [96.6 °F (35.9 °C)-98.6 °F (37 °C)] 97 °F (36.1 °C)  Pulse:  [62-69] 62  Resp:  [16-20] 18  SpO2:  [95 %-99 %] 95 %  BP: (118-168)/(60-77) 127/63     Weight: 109.3 kg (241 lb)  Body mass index is 37.75 kg/m².    Intake/Output Summary (Last 24 hours) at 10/23/2023 1611  Last data filed at 10/23/2023 0643  Gross per 24 hour   Intake 612 ml   Output 1275 ml   Net -663 ml           Physical Exam  Vitals and nursing note reviewed.   Constitutional:       General: He is not in acute distress.     Appearance: He is not toxic-appearing.   HENT:      Head: Normocephalic and atraumatic.   Cardiovascular:      Rate and Rhythm: Normal rate and regular rhythm.      Heart sounds:      No friction rub. No gallop.   Pulmonary:      Effort: Pulmonary effort is normal. No respiratory distress.      Breath sounds: No wheezing or rales.   Chest:      Chest wall: No tenderness.   Abdominal:      General: Bowel sounds are normal. There is no distension.      Tenderness: There is no abdominal tenderness. There is no guarding or rebound.   Musculoskeletal:         General: No swelling or tenderness.      Cervical back: No rigidity or tenderness.      Right lower leg: No edema.      Left lower leg: No edema.   Skin:     Findings: No erythema or rash.   Neurological:      General: No focal deficit present.      Mental Status: He is alert and oriented to person, place, and time.      Cranial Nerves: No cranial nerve deficit.      Motor: No weakness.      Coordination:  "Coordination normal.      Gait: Gait normal.   Psychiatric:         Thought Content: Thought content normal.             Significant Labs: A1C:   Recent Labs   Lab 10/21/23  0433   HGBA1C 5.6       ABGs: No results for input(s): "PH", "PCO2", "HCO3", "POCSATURATED", "BE", "TOTALHB", "COHB", "METHB", "O2HB", "POCFIO2", "PO2" in the last 48 hours.  Blood Culture:   No results for input(s): "LABBLOO" in the last 48 hours.    CBC:   Recent Labs   Lab 10/22/23  0402 10/23/23  0319   WBC 9.22 10.07   HGB 6.4* 7.7*   HCT 19.6* 23.8*    234       CMP:   Recent Labs   Lab 10/22/23  0402 10/23/23  0319    138   K 5.5* 5.3*   * 108   CO2 15* 15*   * 110   BUN 96* 104*   CREATININE 11.2* 11.3*   CALCIUM 8.2* 8.0*   PROT 6.1 5.9*   ALBUMIN 2.4* 2.4*   BILITOT 0.4 0.5   ALKPHOS 52* 54*   AST 14 13   ALT 12 10   ANIONGAP 13 15       Lactic Acid: No results for input(s): "LACTATE" in the last 48 hours.  Lipase: No results for input(s): "LIPASE" in the last 48 hours.  Lipid Panel:   Recent Labs   Lab 10/22/23  0402   CHOL 132   HDL 19*   LDLCALC 86.0   TRIG 135   CHOLHDL 14.4*       Magnesium:   No results for input(s): "MG" in the last 48 hours.    Troponin: No results for input(s): "TROPONINI", "TROPONINIHS" in the last 48 hours.  TSH:   Recent Labs   Lab 10/21/23  0433   TSH 1.153       Urine Culture: No results for input(s): "LABURIN" in the last 48 hours.  Urine Studies:   No results for input(s): "COLORU", "APPEARANCEUA", "PHUR", "SPECGRAV", "PROTEINUA", "GLUCUA", "KETONESU", "BILIRUBINUA", "OCCULTUA", "NITRITE", "UROBILINOGEN", "LEUKOCYTESUR", "RBCUA", "WBCUA", "BACTERIA", "SQUAMEPITHEL", "HYALINECASTS" in the last 48 hours.    Invalid input(s): "WRIGHTSUR"      Significant Imaging: I have reviewed all pertinent imaging results/findings within the past 24 hours.      Assessment/Plan:      * CKD (chronic kidney disease), stage V  -likely ESRD  -consult nephrology and F/U " recommendation      Anemia  Consult GI :   Needs EGD/colonoscopy - can be outpatient vs inpatient pending clinical course. Monitor for overt GI bleeding.      Hyperkalemia  Shift       Severe obesity (BMI >= 40)  Body mass index is 37.75 kg/m². Morbid obesity complicates all aspects of disease management from diagnostic modalities to treatment. Weight loss encouraged and health benefits explained to patient.         Cough in adult  -cont monitor  -will give robitussin  CT A/P  Abnormal appearance of the lung parenchyma with several patchy subsegmental areas of ground-glass opacities more so centrally within the bilateral upper and lower lung zones concerning for an underlying infectious process.  Bilateral pleural effusion.  Subcentimeter air collection adjacent to the proximal trachea and esophagus could represent a tracheocele or a small duodenal diverticulum.     Mild stranding adjacent to the bilateral kidneys, nonspecific, can be associated with an underlying inflammatory process of the kidneys, to correlate with urinalysis.    Anemia due to chronic kidney disease  -S/P one unit of PRBC, repeat H/H  - consent signed  -monitor H/H and monitor for bleeding  -will get anemia profile  CT A/P  Abnormal appearance of the lung parenchyma with several patchy subsegmental areas of ground-glass opacities more so centrally within the bilateral upper and lower lung zones concerning for an underlying infectious process.  Bilateral pleural effusion.  Subcentimeter air collection adjacent to the proximal trachea and esophagus could represent a tracheocele or a small duodenal diverticulum.     Mild stranding adjacent to the bilateral kidneys, nonspecific, can be associated with an underlying inflammatory process of the kidneys, to correlate with urinalysis.  Consult ID- T-Spot, Urine histoplasma and Blastomyces Ag, HIV and RPR.   Please confer with pulmonology  Consult GI- appreciates rec's    Hypertensive retinopathy,  bilateral  -notion of eye injection monthly with blurry vision  -will consult ophthalmology or outpatient fu      Microscopic hematuria  -persist  -defer to urology Vs nephrology       Essential hypertension  -resume home meds with carvidelol and add amlodipine      Type 2 diabetes mellitus with hyperglycemia, without long-term current use of insulin  -monitor FS and start ISS  - A1C 5.6      VTE Risk Mitigation (From admission, onward)         Ordered     IP VTE HIGH RISK PATIENT  Once         10/21/23 0245     Place sequential compression device  Until discontinued         10/21/23 0245                Discharge Planning   [place]:      Code Status: Full Code   Is the patient medically ready for discharge?:     Reason for patient still in hospital (select all that apply): Patient trending condition  Discharge Plan A: Home, Home with family                  Mame Booker MD  Department of Hospital Medicine   [facility]

## 2023-10-23 NOTE — CONSULTS
Progress Note  [facility]      Consulting Physician: Mame Booker*  Date of Admission: 10/21/2023  Date of Consult: 10/23/2023  Reason for Consult: Abnormal CT findings    ASSESSMENT:   Abnormal findings on CT Chest c/w indolent inflammatory vs infectious process. Infectious history only consistent with cough. VSS. At present, would work up indolent infectious causes that may cause such changes in pulmonary architecture.   Hypertension  Diabetes  CKD  Anemia    RECOMMENDATIONS:   Please obtain T-Spot, Urine histoplasma and Blastomyces Ag, HIV and RPR.   Please confer with pulmonology     Thanks for this consult!. Please call if you have any questions.    Livier Herrmann MD  Internal Medicine/Pediatrics HO-IV  [facility]  [phone] pager    Subjective:   Medications reviewed; current antibiotics:  Antibiotics (From admission, onward)      Start     Stop Route Frequency Ordered    10/21/23 0900  mupirocin 2 % ointment         10/26/23 0859 Nasl 2 times daily 10/21/23 0247          Chief Complaint:     History of Present Illness:  Patient is a 53 y.o. year old male 54 YO M morbidly obese, HTN, DM was transferred from [facility] to [facility] for on 10/21, after transfer from [facility] ED for hypertensive urgency, and anemia requiring blood transfusions, as well as acutely elevated creatinine and hyperkalemia. Reports feeling weak and having unintentional weight loss over last several weeks. CT/CA/P for evaluation of blood loss and unintentional weight loss revealed several patchy subsegmental areas of ground-glass opacities more so centrally within the bilateral upper and lower lung zones concerning for an underlying infectious process. Bilateral pleural effusion.    Review of Symptoms:  Review of Systems   Constitutional:  Negative for chills, fever and malaise/fatigue.   HENT:  Negative for congestion, sinus pain and sore throat.    Respiratory:  Positive for cough. Negative for  "sputum production and shortness of breath.    Cardiovascular:  Negative for chest pain, palpitations and leg swelling.   Gastrointestinal:  Negative for abdominal pain, diarrhea, nausea and vomiting.   Genitourinary:  Negative for dysuria, frequency and urgency.   Musculoskeletal:  Negative for back pain, joint pain and neck pain.   Skin:  Negative for rash.   Neurological:  Negative for tremors, focal weakness, weakness and headaches.   All other systems reviewed and are negative.    Past Medical History:  Past Medical History:   Diagnosis Date    CKD (chronic kidney disease), stage V 10/21/2023    Diabetes mellitus     Hypertension     Urinary tract infection        Past Surgical History:  Past Surgical History:   Procedure Laterality Date    ESOPHAGOGASTRODUODENOSCOPY N/A 3/13/2020    Procedure: EGD (ESOPHAGOGASTRODUODENOSCOPY);  Surgeon: Katiuska العراقي MD;  [facility];  Service: Endoscopy;  Laterality: N/A;    LAPAROSCOPIC CHOLECYSTECTOMY N/A 6/22/2020    Procedure: CHOLECYSTECTOMY, LAPAROSCOPIC;  Surgeon: Dayron Conner MD;  [facility];  Service: General;  Laterality: N/A;       Family History:  Family History   Problem Relation Age of Onset    Prostate cancer Neg Hx     Kidney disease Neg Hx        Social History:  Social History     Socioeconomic History    Marital status:    Tobacco Use    Smoking status: Former     Types: Cigarettes    Smokeless tobacco: Never    Tobacco comments:     quit "long time ago"   Substance and Sexual Activity    Alcohol use: Yes     Alcohol/week: 1.0 standard drink of alcohol     Types: 1 Cans of beer per week     Comment: social    Drug use: Never    Sexual activity: Yes     Partners: Female       Allergies:  Review of patient's allergies indicates:  No Known Allergies    Pertinent Medications:  Antibiotics:   Antibiotics (From admission, onward)      Start     Stop Route Frequency Ordered    10/21/23 0900  mupirocin 2 % ointment         10/26/23 " 0859 Nasl 2 times daily 10/21/23 0247            Objective:     Physical Exam:  VS (24h):   Vitals:    10/23/23 0815   BP: 127/62   Pulse: 68   Resp: 20   Temp: 98.6 °F (37 °C)     Temp:  [96.6 °F (35.9 °C)-98.6 °F (37 °C)]     Physical Exam  Vitals reviewed.   Constitutional:       General: He is not in acute distress.     Appearance: Normal appearance. He is normal weight. He is not ill-appearing or toxic-appearing.   HENT:      Head: Normocephalic and atraumatic.      Nose: Nose normal. No congestion or rhinorrhea.      Mouth/Throat:      Mouth: Mucous membranes are moist.      Pharynx: Oropharynx is clear.   Eyes:      Extraocular Movements: Extraocular movements intact.      Pupils: Pupils are equal, round, and reactive to light.   Cardiovascular:      Rate and Rhythm: Normal rate and regular rhythm.      Pulses: Normal pulses.      Heart sounds: Normal heart sounds. No murmur heard.     No friction rub. No gallop.   Pulmonary:      Effort: Pulmonary effort is normal.      Breath sounds: Normal breath sounds.   Abdominal:      General: Abdomen is flat. Bowel sounds are normal. There is no distension.      Palpations: Abdomen is soft. There is no mass.      Tenderness: There is no abdominal tenderness.   Musculoskeletal:         General: Normal range of motion.      Cervical back: Normal range of motion. No rigidity.      Right lower leg: No edema.      Left lower leg: No edema.   Lymphadenopathy:      Cervical: No cervical adenopathy.   Skin:     General: Skin is warm and dry.      Capillary Refill: Capillary refill takes less than 2 seconds.   Neurological:      General: No focal deficit present.      Mental Status: He is alert and oriented to person, place, and time. Mental status is at baseline.   Psychiatric:         Mood and Affect: Mood normal.         Behavior: Behavior normal.         Thought Content: Thought content normal.         Laboratory  Lab Results   Component Value Date    WBC 10.07 10/23/2023     WBC 9.22 10/22/2023    WBC 11.27 10/21/2023    WBC 11.30 10/21/2023    WBC 9.23 10/20/2023    HGB 7.7 (L) 10/23/2023    HCT 23.8 (L) 10/23/2023    MCV 83 10/23/2023     10/23/2023     Recent Labs   Lab 10/23/23  0319         K 5.3*      CO2 15*   *   CREATININE 11.3*   CALCIUM 8.0*     Lab Results   Component Value Date    ALT 10 10/23/2023    AST 13 10/23/2023    ALKPHOS 54 (L) 10/23/2023    BILITOT 0.5 10/23/2023       Microbiology (Last 7 Days)  Microbiology Results (last 7 days)       Procedure Component Value Units Date/Time    Blood culture [[id number]] Collected: 10/21/23 0756    Order Status: Completed Specimen: Blood Updated: 10/22/23 1822     Blood Culture, Routine No Growth to date      No Growth to date    Blood culture [[id number]] Collected: 10/21/23 0756    Order Status: Completed Specimen: Blood Updated: 10/22/23 1822     Blood Culture, Routine No Growth to date      No Growth to date              ASSESSMENT/PLAN:   See as above.    Active Hospital Problems    Diagnosis  POA    Hyperkalemia [E87.5]  Yes    Anemia [D64.9]  Yes    Anemia due to chronic kidney disease [N18.9, D63.1]  Yes    CKD (chronic kidney disease), stage V [N18.5]  Yes    Cough in adult [R05.9]  Yes    Severe obesity (BMI >= 40) [E66.01]  Yes    Hypertensive retinopathy, bilateral [H35.033]  Yes    Microscopic hematuria [R31.29]  Yes    Type 2 diabetes mellitus with hyperglycemia, without long-term current use of insulin [E11.65]  Yes    Essential hypertension [I10]  Yes      Resolved Hospital Problems   No resolved problems to display.

## 2023-10-23 NOTE — CONSULTS
Patient with T2DM for about 10 years now per patient and wife.  Wife has a working glucometer and checks his blood sugar periodically.  Patient does not take any medications now for his diabetes.  Per patient, he is physically active doing exercises and watches what he eats meaning smaller portions.  Verbal/Written (Diabetes Management Guide Estonian version) education done with patient on A1C what it is, goal of <7.0%, and his current A1C of 5.6.  Education done on Consistent Carbohydrate Counting diet including how to read a nutrition label, eating 75-90 grams per meal and 15-30 grams per snack (1-3 snacks/day), portion sizes, sample meal plans, and resources to use for carb counting.  All education understood per patient and spouse.  All questions answered.  Guide given to patient for continued use.  All education done with  Elliot #[id number].

## 2023-10-23 NOTE — PLAN OF CARE
CM met with pt at bedside to discuss discharge planning  Dx: Hypertensive Emergency  Pt is independent with ADL's. Pt has no DME/HH services at home. Pt lives at home with spouse.  Upon discharge, pts family member Jana Grayvas [phone] will provide transport home  Pt made aware to contact CM with any questions or concerns. Cm will continue to follow and assist with any discharge needs    Future Appointments   Date Time Provider Department Center   10/26/2023  3:00 PM ROBERT Marino MD [facility] [place]      10/23/23 3017   Discharge Assessment   Assessment Type Discharge Planning Assessment   Confirmed/corrected address, phone number and insurance Yes   Confirmed Demographics Correct on Facesheet   Source of Information family;patient   When was your last doctors appointment? 10/16/23   Communicated [place] with patient/caregiver Date not available/Unable to determine   Reason For Admission Hypertensive crisis   People in Home spouse   Do you expect to return to your current living situation? Yes   Do you have help at home or someone to help you manage your care at home? Yes   Who are your caregiver(s) and their phone number(s)? Janalauren Marcelo [phone]   Prior to hospitilization cognitive status: Alert/Oriented   Current cognitive status: Alert/Oriented   Equipment Currently Used at Home none   Readmission within 30 days? No   Patient currently being followed by outpatient case management? No   Do you currently have service(s) that help you manage your care at home? No   Do you take prescription medications? Yes   Do you have prescription coverage? Yes   Coverage Medicaid   Do you have any problems affording any of your prescribed medications? No  (PT presently has insurance that pays for meds)   Is the patient taking medications as prescribed? yes   Who is going to help you get home at discharge? Jana Davian [phone]   How do you get to doctors appointments? car, drives self   Are you on  dialysis? No   Do you take coumadin? No   DME Needed Upon Discharge  none   Discharge Plan discussed with: Spouse/sig other;Patient   Name(s) and Number(s) Jana Marcelo [phone]   Transition of Care Barriers None   Discharge Plan A Home;Home with family   Physical Activity   On average, how many days per week do you engage in moderate to strenuous exercise (like a brisk walk)? 5 days   On average, how many minutes do you engage in exercise at this level? 30 min   Financial Resource Strain   How hard is it for you to pay for the very basics like food, housing, medical care, and heating? Not hard   Housing Stability   In the last 12 months, was there a time when you were not able to pay the mortgage or rent on time? N   In the last 12 months, how many places have you lived? 1   In the last 12 months, was there a time when you did not have a steady place to sleep or slept in a shelter (including now)? N   Transportation Needs   In the past 12 months, has lack of transportation kept you from medical appointments or from getting medications? no   In the past 12 months, has lack of transportation kept you from meetings, work, or from getting things needed for daily living? No   Food Insecurity   Within the past 12 months, you worried that your food would run out before you got the money to buy more. Never true   Within the past 12 months, the food you bought just didn't last and you didn't have money to get more. Never true   Stress   Do you feel stress - tense, restless, nervous, or anxious, or unable to sleep at night because your mind is troubled all the time - these days? To some exte   Social Connections   In a typical week, how many times do you talk on the phone with family, friends, or neighbors? More than 3   How often do you get together with friends or relatives? More than 3   How often do you attend Confucianist or Episcopal services? Never   Do you belong to any clubs or organizations such as Confucianist groups,  unions, fraternal or athletic groups, or school groups? No   How often do you attend meetings of the clubs or organizations you belong to? Never   Are you , , , , never , or living with a partner?    Alcohol Use   Q1: How often do you have a drink containing alcohol? Monthly or l   Q2: How many drinks containing alcohol do you have on a typical day when you are drinking? 1 or 2   Q3: How often do you have six or more drinks on one occasion? Never   OTHER   Name(s) of People in Home [facility] [phone]

## 2023-10-23 NOTE — PROGRESS NOTES
Nephrology Progress Note       Consult Requested By: Mame Booker*  Reason for Consult: NESTOR on CKD5     SUBJECTIVE:        ?    Review of Systems   Constitutional:  Negative for chills and fever.   HENT:  Negative for congestion and sore throat.    Eyes:  Negative for blurred vision, double vision and photophobia.   Respiratory:  Negative for cough and shortness of breath.    Cardiovascular:  Negative for chest pain, palpitations and leg swelling.   Gastrointestinal:  Negative for abdominal pain, diarrhea, nausea and vomiting.   Genitourinary:  Negative for dysuria and urgency.   Musculoskeletal:  Negative for joint pain and myalgias.   Skin:  Negative for itching and rash.   Neurological:  Negative for dizziness, sensory change, weakness and headaches.   Endo/Heme/Allergies:  Negative for polydipsia. Does not bruise/bleed easily.   Psychiatric/Behavioral:  Negative for depression.        Past Medical History:   Diagnosis Date    CKD (chronic kidney disease), stage V 10/21/2023    Diabetes mellitus     Hypertension     Urinary tract infection           OBJECTIVE:     Vital Signs (Most Recent)  Vitals:    10/22/23 2340 10/23/23 0401 10/23/23 0528 10/23/23 0815   BP:   118/60 127/62   BP Location:    Right arm   Patient Position:   Lying Lying   Pulse: 68 63 64 68   Resp:   20 20   Temp:   96.6 °F (35.9 °C) 98.6 °F (37 °C)   TempSrc:   Oral Oral   SpO2:   98% 98%   Weight:       Height:                       Medications:   amLODIPine  10 mg Oral Daily    carvediloL  25 mg Oral BID    hydrALAZINE  50 mg Oral Q8H    mupirocin   Nasal BID    sodium bicarbonate  650 mg Oral TID    [START ON 10/24/2023] sodium zirconium cyclosilicate  10 g Oral Daily    sodium zirconium cyclosilicate  10 g Oral Once           Physical Exam  Vitals and nursing note reviewed.   Constitutional:       General: He is not in acute distress.     Appearance: He is obese. He is not diaphoretic.   HENT:      Head: Normocephalic and  atraumatic.      Mouth/Throat:      Pharynx: No oropharyngeal exudate.   Eyes:      General: No scleral icterus.     Conjunctiva/sclera: Conjunctivae normal.      Pupils: Pupils are equal, round, and reactive to light.   Cardiovascular:      Rate and Rhythm: Normal rate and regular rhythm.      Heart sounds: Normal heart sounds. No murmur heard.  Pulmonary:      Effort: Pulmonary effort is normal. No respiratory distress.      Breath sounds: Normal breath sounds.   Abdominal:      General: Bowel sounds are normal. There is no distension.      Palpations: Abdomen is soft.      Tenderness: There is no abdominal tenderness.   Musculoskeletal:         General: Normal range of motion.      Cervical back: Normal range of motion and neck supple.   Skin:     General: Skin is warm and dry.      Findings: No erythema.   Neurological:      Mental Status: He is alert and oriented to person, place, and time.      Cranial Nerves: No cranial nerve deficit.   Psychiatric:         Mood and Affect: Affect normal.         Cognition and Memory: Memory normal.         Judgment: Judgment normal.         Laboratory:  Recent Labs   Lab 10/20/23  1609 10/21/23  0309 10/21/23  0310 10/22/23  0402 10/23/23  0319   WBC 9.23   < > 11.27 9.22 10.07   HGB 6.5*   < > 6.9* 6.4* 7.7*   HCT 20.0*   < > 21.3* 19.6* 23.8*      < > 233 223 234   MONO 8.5  0.8  --   --   --   --    EOSINOPHIL 3.9  --   --   --   --     < > = values in this interval not displayed.       Recent Labs   Lab 10/21/23  0309 10/22/23  0402 10/23/23  0319    139 138   K 4.9 5.5* 5.3*    111* 108   CO2 14* 15* 15*   BUN 92* 96* 104*   CREATININE 10.6* 11.2* 11.3*   CALCIUM 7.9* 8.2* 8.0*   PHOS 8.4*  --   --          Diagnostic Results:  X-Ray: Reviewed  US: Reviewed  Echo: Reviewed  ASSESSMENT/PLAN:     1. NESTOR - ? CKD5 Cr 2020 was 0.9 on 3/23 Cr up to 4.4  no Kidney biopsy was performed at that time  Now Cr 11.2 in the setting of sever Anemia Hb 6.5   -- No  need for Kidney biopsy at this time - less likely would be informative.  -- as of now No Indication for RRT at this time has blood loss ? GIB  No Uremia symptoms.  -- US kidney - No hydronephrosis.   UA + 3 prot get UPCR, Many bacteria asymptomatic  would not  treat   Has lungs changes get immunological work up  : MICHAEL, C3/C4, CH50, ANCA, MPO, PR3, ESR, CRP, RF, RPR,   SPEP, UPEP,   serum free light chains  - however may be obscured by blood transfusion   And his Kidney failure just a progression of his not controlled DM/HTN   Most likely need Dialysis very soon his Nephrologist Marisabel Desouza in [facility]   -- Daily Renal Function Panel  -- Avoid Hypotension.  -- Renally dose all meds  -- Please avoid nephrotoxins, including NSAIDs, aminoglycosides, IV contrast (unless absolutely necessary), gadolinium, fleets and other phosphorous-based laxatives. Caution with antibiotics.    Hyperkalemia   -- Low K+ diet   -- Lokelma 10 daily     2. HTN (I10) -  controlled now  With amlodipine 10   Coreg 25 BID  Hydralazine 50 TID     3. Anemia of chronic kidney disease   -- Transfuse <7   GI consulted   Recent Labs   Lab 10/21/23  0310 10/22/23  0402 10/23/23  0319   HGB 6.9* 6.4* 7.7*   HCT 21.3* 19.6* 23.8*    223 234         Iron   Lab Results   Component Value Date    IRON 28 (L) 10/21/2023    TIBC 247 (L) 10/21/2023    FERRITIN 433 (H) 10/21/2023       4. MBD (E88.9 M90.80) -   PTh  elevated Vit D  low   Recent Labs   Lab 10/21/23  0309 10/22/23  0402 10/22/23  1227 10/23/23  0319   PTH  --   --  427.6*  --    CALCIUM 7.9*   < >  --  8.0*   PHOS 8.4*  --   --   --     < > = values in this interval not displayed.       Recent Labs   Lab 10/20/23  1609 10/21/23  0309   MG 1.9 1.8         Lab Results   Component Value Date    .6 (H) 10/22/2023    CALCIUM 8.0 (L) 10/23/2023    PHOS 8.4 (H) 10/21/2023   Start Binders phoslo TID titrate as needed   Lab Results   Component Value Date    IQWHHIEL95MT 16 (L) 10/22/2023      Acidosis - start PO bicarbonate 650 TID titrate as needed   Lab Results   Component Value Date    CO2 15 (L) 10/23/2023       5. Nutrition/Hypoalbuminemia   Recent Labs   Lab 10/22/23  0402 10/23/23  0319   ALBUMIN 2.4* 2.4*       -- check UPCR       Thank you for allowing me to participate in care of your patient  With any question please call   Glenn Mesa MD     [facility]  MUKUL Cabrera MD,   OMD MANDA Bhaena MD [facility] V[facility], NP  [address]   [place], [address] [phone]  After hours answering service: [phone]

## 2023-10-23 NOTE — ASSESSMENT & PLAN NOTE
Consult GI :   Needs EGD/colonoscopy - can be outpatient vs inpatient pending clinical course. Monitor for overt GI bleeding.

## 2023-10-23 NOTE — ASSESSMENT & PLAN NOTE
-cont monitor  -will give robitussin  CT A/P  Abnormal appearance of the lung parenchyma with several patchy subsegmental areas of ground-glass opacities more so centrally within the bilateral upper and lower lung zones concerning for an underlying infectious process.  Bilateral pleural effusion.  Subcentimeter air collection adjacent to the proximal trachea and esophagus could represent a tracheocele or a small duodenal diverticulum.     Mild stranding adjacent to the bilateral kidneys, nonspecific, can be associated with an underlying inflammatory process of the kidneys, to correlate with urinalysis.

## 2023-10-23 NOTE — SUBJECTIVE & OBJECTIVE
Interval History:  Awake and alert, updated patient and spouse on result  Hyperkalemia-shift potassium with Lokelma   H/H stable.   Stool occult blood negative.   Worsening renal function-strict input/ output   Appreciates Nephrology recs   Consult ID and pulmonology    Review of Systems   Constitutional:  Negative for activity change, appetite change and chills.   Respiratory:  Negative for cough, chest tightness, shortness of breath and wheezing.    Cardiovascular:  Negative for chest pain, palpitations and leg swelling.   Gastrointestinal:  Negative for abdominal pain, constipation, diarrhea, nausea and vomiting.   Musculoskeletal:  Negative for back pain and gait problem.   Neurological:  Negative for dizziness, weakness and headaches.   Psychiatric/Behavioral:  Negative for agitation, confusion and hallucinations.    All other systems reviewed and are negative.    Objective:     Vital Signs (Most Recent):  Temp: 97 °F (36.1 °C) (10/23/23 1146)  Pulse: 62 (10/23/23 1232)  Resp: 18 (10/23/23 1146)  BP: 127/63 (10/23/23 1146)  SpO2: 95 % (10/23/23 1146) Vital Signs (24h Range):  Temp:  [96.6 °F (35.9 °C)-98.6 °F (37 °C)] 97 °F (36.1 °C)  Pulse:  [62-69] 62  Resp:  [16-20] 18  SpO2:  [95 %-99 %] 95 %  BP: (118-168)/(60-77) 127/63     Weight: 109.3 kg (241 lb)  Body mass index is 37.75 kg/m².    Intake/Output Summary (Last 24 hours) at 10/23/2023 1611  Last data filed at 10/23/2023 0643  Gross per 24 hour   Intake 612 ml   Output 1275 ml   Net -663 ml           Physical Exam  Vitals and nursing note reviewed.   Constitutional:       General: He is not in acute distress.     Appearance: He is not toxic-appearing.   HENT:      Head: Normocephalic and atraumatic.   Cardiovascular:      Rate and Rhythm: Normal rate and regular rhythm.      Heart sounds:      No friction rub. No gallop.   Pulmonary:      Effort: Pulmonary effort is normal. No respiratory distress.      Breath sounds: No wheezing or rales.   Chest:       "Chest wall: No tenderness.   Abdominal:      General: Bowel sounds are normal. There is no distension.      Tenderness: There is no abdominal tenderness. There is no guarding or rebound.   Musculoskeletal:         General: No swelling or tenderness.      Cervical back: No rigidity or tenderness.      Right lower leg: No edema.      Left lower leg: No edema.   Skin:     Findings: No erythema or rash.   Neurological:      General: No focal deficit present.      Mental Status: He is alert and oriented to person, place, and time.      Cranial Nerves: No cranial nerve deficit.      Motor: No weakness.      Coordination: Coordination normal.      Gait: Gait normal.   Psychiatric:         Thought Content: Thought content normal.             Significant Labs: A1C:   Recent Labs   Lab 10/21/23  0433   HGBA1C 5.6       ABGs: No results for input(s): "PH", "PCO2", "HCO3", "POCSATURATED", "BE", "TOTALHB", "COHB", "METHB", "O2HB", "POCFIO2", "PO2" in the last 48 hours.  Blood Culture:   No results for input(s): "LABBLOO" in the last 48 hours.    CBC:   Recent Labs   Lab 10/22/23  0402 10/23/23  0319   WBC 9.22 10.07   HGB 6.4* 7.7*   HCT 19.6* 23.8*    234       CMP:   Recent Labs   Lab 10/22/23  0402 10/23/23  0319    138   K 5.5* 5.3*   * 108   CO2 15* 15*   * 110   BUN 96* 104*   CREATININE 11.2* 11.3*   CALCIUM 8.2* 8.0*   PROT 6.1 5.9*   ALBUMIN 2.4* 2.4*   BILITOT 0.4 0.5   ALKPHOS 52* 54*   AST 14 13   ALT 12 10   ANIONGAP 13 15       Lactic Acid: No results for input(s): "LACTATE" in the last 48 hours.  Lipase: No results for input(s): "LIPASE" in the last 48 hours.  Lipid Panel:   Recent Labs   Lab 10/22/23  0402   CHOL 132   HDL 19*   LDLCALC 86.0   TRIG 135   CHOLHDL 14.4*       Magnesium:   No results for input(s): "MG" in the last 48 hours.    Troponin: No results for input(s): "TROPONINI", "TROPONINIHS" in the last 48 hours.  TSH:   Recent Labs   Lab 10/21/23  0433   TSH 1.153       Urine " "Culture: No results for input(s): "LABURIN" in the last 48 hours.  Urine Studies:   No results for input(s): "COLORU", "APPEARANCEUA", "PHUR", "SPECGRAV", "PROTEINUA", "GLUCUA", "KETONESU", "BILIRUBINUA", "OCCULTUA", "NITRITE", "UROBILINOGEN", "LEUKOCYTESUR", "RBCUA", "WBCUA", "BACTERIA", "SQUAMEPITHEL", "HYALINECASTS" in the last 48 hours.    Invalid input(s): "WRIGHTSUR"      Significant Imaging: I have reviewed all pertinent imaging results/findings within the past 24 hours.  "

## 2023-10-23 NOTE — PLAN OF CARE
1424  [facility] [place] appt scheduled for the pt with Dr Abhi Peralta (PCP) on 11/3/2023 at 0945. Information added to the pt's discharge paperwork.

## 2023-10-23 NOTE — PLAN OF CARE
Pt not medically stable for discharge today     10/23/23 0930   Rounds   Attendance Provider;Nurse    Discharge Plan A Home;Home with family   Why the patient remains in the hospital Requires continued medical care   Transition of Care Barriers None

## 2023-10-23 NOTE — ASSESSMENT & PLAN NOTE
-S/P one unit of PRBC, repeat H/H  - consent signed  -monitor H/H and monitor for bleeding  -will get anemia profile  CT A/P  Abnormal appearance of the lung parenchyma with several patchy subsegmental areas of ground-glass opacities more so centrally within the bilateral upper and lower lung zones concerning for an underlying infectious process.  Bilateral pleural effusion.  Subcentimeter air collection adjacent to the proximal trachea and esophagus could represent a tracheocele or a small duodenal diverticulum.     Mild stranding adjacent to the bilateral kidneys, nonspecific, can be associated with an underlying inflammatory process of the kidneys, to correlate with urinalysis.  Consult ID- T-Spot, Urine histoplasma and Blastomyces Ag, HIV and RPR.   Please confer with pulmonology  Consult GI- appreciates rec's

## 2023-10-24 PROBLEM — R93.89 ABNORMAL CT OF THE CHEST: Status: ACTIVE | Noted: 2023-10-24

## 2023-10-24 PROBLEM — E87.8 DISORDER OF ELECTROLYTES: Status: ACTIVE | Noted: 2023-10-24

## 2023-10-24 LAB
ALBUMIN SERPL BCP-MCNC: 2.6 G/DL (ref 3.5–5.2)
ALBUMIN SERPL BCP-MCNC: 2.7 G/DL (ref 3.5–5.2)
ALBUMIN SERPL ELPH-MCNC: 2.84 G/DL (ref 3.35–5.55)
ALP SERPL-CCNC: 51 U/L (ref 55–135)
ALPHA1 GLOB SERPL ELPH-MCNC: 0.42 G/DL (ref 0.17–0.41)
ALPHA2 GLOB SERPL ELPH-MCNC: 1.21 G/DL (ref 0.43–0.99)
ALT SERPL W/O P-5'-P-CCNC: 13 U/L (ref 10–44)
ANION GAP SERPL CALC-SCNC: 20 MMOL/L (ref 8–16)
ANION GAP SERPL CALC-SCNC: 22 MMOL/L (ref 8–16)
AST SERPL-CCNC: 14 U/L (ref 10–40)
B-GLOBULIN SERPL ELPH-MCNC: 0.74 G/DL (ref 0.5–1.1)
BASOPHILS # BLD AUTO: 0.02 K/UL (ref 0–0.2)
BASOPHILS NFR BLD: 0.2 % (ref 0–1.9)
BILIRUB SERPL-MCNC: 0.4 MG/DL (ref 0.1–1)
BUN SERPL-MCNC: 112 MG/DL (ref 6–20)
BUN SERPL-MCNC: 112 MG/DL (ref 6–20)
CALCIUM SERPL-MCNC: 8.2 MG/DL (ref 8.7–10.5)
CALCIUM SERPL-MCNC: 8.2 MG/DL (ref 8.7–10.5)
CHLORIDE SERPL-SCNC: 106 MMOL/L (ref 95–110)
CHLORIDE SERPL-SCNC: 107 MMOL/L (ref 95–110)
CO2 SERPL-SCNC: 11 MMOL/L (ref 23–29)
CO2 SERPL-SCNC: 9 MMOL/L (ref 23–29)
CREAT SERPL-MCNC: 12 MG/DL (ref 0.5–1.4)
CREAT SERPL-MCNC: 12 MG/DL (ref 0.5–1.4)
DIFFERENTIAL METHOD: ABNORMAL
DSDNA AB SER-ACNC: NORMAL [IU]/ML
EOSINOPHIL # BLD AUTO: 0.3 K/UL (ref 0–0.5)
EOSINOPHIL NFR BLD: 2.9 % (ref 0–8)
ERYTHROCYTE [DISTWIDTH] IN BLOOD BY AUTOMATED COUNT: 13 % (ref 11.5–14.5)
EST. GFR  (NO RACE VARIABLE): 5 ML/MIN/1.73 M^2
EST. GFR  (NO RACE VARIABLE): 5 ML/MIN/1.73 M^2
GAMMA GLOB SERPL ELPH-MCNC: 0.7 G/DL (ref 0.67–1.58)
GLUCOSE SERPL-MCNC: 138 MG/DL (ref 70–110)
GLUCOSE SERPL-MCNC: 147 MG/DL (ref 70–110)
HCT VFR BLD AUTO: 24.5 % (ref 40–54)
HGB BLD-MCNC: 8.1 G/DL (ref 14–18)
HIV 1+2 AB+HIV1 P24 AG SERPL QL IA: NORMAL
IMM GRANULOCYTES # BLD AUTO: 0.07 K/UL (ref 0–0.04)
IMM GRANULOCYTES NFR BLD AUTO: 0.8 % (ref 0–0.5)
INTERPRETATION SERPL IFE-IMP: NORMAL
KAPPA LC SER QL IA: 13.26 MG/DL (ref 0.33–1.94)
KAPPA LC/LAMBDA SER IA: 1.84 (ref 0.26–1.65)
LAMBDA LC SER QL IA: 7.19 MG/DL (ref 0.57–2.63)
LYMPHOCYTES # BLD AUTO: 1 K/UL (ref 1–4.8)
LYMPHOCYTES NFR BLD: 11.2 % (ref 18–48)
MCH RBC QN AUTO: 27 PG (ref 27–31)
MCHC RBC AUTO-ENTMCNC: 33.1 G/DL (ref 32–36)
MCV RBC AUTO: 82 FL (ref 82–98)
MONOCYTES # BLD AUTO: 0.8 K/UL (ref 0.3–1)
MONOCYTES NFR BLD: 9.2 % (ref 4–15)
NEUTROPHILS # BLD AUTO: 6.7 K/UL (ref 1.8–7.7)
NEUTROPHILS NFR BLD: 75.7 % (ref 38–73)
NRBC BLD-RTO: 0 /100 WBC
PHOSPHATE SERPL-MCNC: 9.4 MG/DL (ref 2.7–4.5)
PLATELET # BLD AUTO: 234 K/UL (ref 150–450)
PMV BLD AUTO: 8.6 FL (ref 9.2–12.9)
POCT GLUCOSE: 110 MG/DL (ref 70–110)
POCT GLUCOSE: 121 MG/DL (ref 70–110)
POCT GLUCOSE: 123 MG/DL (ref 70–110)
POCT GLUCOSE: 130 MG/DL (ref 70–110)
POCT GLUCOSE: 133 MG/DL (ref 70–110)
POTASSIUM SERPL-SCNC: 5.1 MMOL/L (ref 3.5–5.1)
POTASSIUM SERPL-SCNC: 5.2 MMOL/L (ref 3.5–5.1)
PROT SERPL-MCNC: 5.9 G/DL (ref 6–8.4)
PROT SERPL-MCNC: 6.3 G/DL (ref 6–8.4)
PROTEINASE3 IGG SER-ACNC: <0.2 U
RBC # BLD AUTO: 3 M/UL (ref 4.6–6.2)
RPR SER QL: NORMAL
SODIUM SERPL-SCNC: 137 MMOL/L (ref 136–145)
SODIUM SERPL-SCNC: 138 MMOL/L (ref 136–145)
WBC # BLD AUTO: 8.84 K/UL (ref 3.9–12.7)

## 2023-10-24 PROCEDURE — 99232 SBSQ HOSP IP/OBS MODERATE 35: CPT | Mod: ,,, | Performed by: INTERNAL MEDICINE

## 2023-10-24 PROCEDURE — 85025 COMPLETE CBC W/AUTO DIFF WBC: CPT | Performed by: FAMILY MEDICINE

## 2023-10-24 PROCEDURE — A4217 STERILE WATER/SALINE, 500 ML: HCPCS | Performed by: STUDENT IN AN ORGANIZED HEALTH CARE EDUCATION/TRAINING PROGRAM

## 2023-10-24 PROCEDURE — 94761 N-INVAS EAR/PLS OXIMETRY MLT: CPT

## 2023-10-24 PROCEDURE — 80069 RENAL FUNCTION PANEL: CPT | Performed by: STUDENT IN AN ORGANIZED HEALTH CARE EDUCATION/TRAINING PROGRAM

## 2023-10-24 PROCEDURE — 36415 COLL VENOUS BLD VENIPUNCTURE: CPT

## 2023-10-24 PROCEDURE — 87449 NOS EACH ORGANISM AG IA: CPT | Performed by: FAMILY MEDICINE

## 2023-10-24 PROCEDURE — 99232 PR SUBSEQUENT HOSPITAL CARE,LEVL II: ICD-10-PCS | Mod: ,,, | Performed by: INTERNAL MEDICINE

## 2023-10-24 PROCEDURE — 25000003 PHARM REV CODE 250: Performed by: STUDENT IN AN ORGANIZED HEALTH CARE EDUCATION/TRAINING PROGRAM

## 2023-10-24 PROCEDURE — 36415 COLL VENOUS BLD VENIPUNCTURE: CPT | Performed by: FAMILY MEDICINE

## 2023-10-24 PROCEDURE — 80053 COMPREHEN METABOLIC PANEL: CPT | Performed by: FAMILY MEDICINE

## 2023-10-24 PROCEDURE — 87385 HISTOPLASMA CAPSUL AG IA: CPT | Performed by: FAMILY MEDICINE

## 2023-10-24 PROCEDURE — 86698 HISTOPLASMA ANTIBODY: CPT

## 2023-10-24 PROCEDURE — 25000003 PHARM REV CODE 250: Performed by: FAMILY MEDICINE

## 2023-10-24 PROCEDURE — 36415 COLL VENOUS BLD VENIPUNCTURE: CPT | Performed by: STUDENT IN AN ORGANIZED HEALTH CARE EDUCATION/TRAINING PROGRAM

## 2023-10-24 PROCEDURE — 21400001 HC TELEMETRY ROOM

## 2023-10-24 RX ORDER — SODIUM BICARBONATE 650 MG/1
1300 TABLET ORAL 3 TIMES DAILY
Status: DISCONTINUED | OUTPATIENT
Start: 2023-10-24 | End: 2023-10-25 | Stop reason: HOSPADM

## 2023-10-24 RX ORDER — CALCIUM ACETATE 667 MG/1
1334 CAPSULE ORAL
Status: DISCONTINUED | OUTPATIENT
Start: 2023-10-24 | End: 2023-10-25 | Stop reason: HOSPADM

## 2023-10-24 RX ORDER — CALCIUM ACETATE 667 MG/1
1334 CAPSULE ORAL
Status: DISCONTINUED | OUTPATIENT
Start: 2023-10-24 | End: 2023-10-24

## 2023-10-24 RX ADMIN — HYDRALAZINE HYDROCHLORIDE 50 MG: 25 TABLET, FILM COATED ORAL at 09:10

## 2023-10-24 RX ADMIN — MUPIROCIN: 20 OINTMENT TOPICAL at 09:10

## 2023-10-24 RX ADMIN — CALCIUM ACETATE 1334 MG: 667 CAPSULE ORAL at 04:10

## 2023-10-24 RX ADMIN — AMLODIPINE BESYLATE 10 MG: 5 TABLET ORAL at 09:10

## 2023-10-24 RX ADMIN — CALCIUM ACETATE 667 MG: 667 CAPSULE ORAL at 09:10

## 2023-10-24 RX ADMIN — SODIUM BICARBONATE: 84 INJECTION, SOLUTION INTRAVENOUS at 02:10

## 2023-10-24 RX ADMIN — SODIUM BICARBONATE 1300 MG: 650 TABLET ORAL at 09:10

## 2023-10-24 RX ADMIN — SODIUM BICARBONATE 650 MG: 650 TABLET ORAL at 09:10

## 2023-10-24 RX ADMIN — SODIUM BICARBONATE 1300 MG: 650 TABLET ORAL at 02:10

## 2023-10-24 RX ADMIN — CARVEDILOL 25 MG: 25 TABLET, FILM COATED ORAL at 09:10

## 2023-10-24 RX ADMIN — SODIUM ZIRCONIUM CYCLOSILICATE 10 G: 5 POWDER, FOR SUSPENSION ORAL at 09:10

## 2023-10-24 RX ADMIN — HYDRALAZINE HYDROCHLORIDE 50 MG: 25 TABLET, FILM COATED ORAL at 05:10

## 2023-10-24 RX ADMIN — HYDRALAZINE HYDROCHLORIDE 50 MG: 25 TABLET, FILM COATED ORAL at 01:10

## 2023-10-24 RX ADMIN — GUAIFENESIN 200 MG: 200 SOLUTION ORAL at 09:10

## 2023-10-24 NOTE — PLAN OF CARE
CM rounded. Pt not medically ready for discharge today. Cm will continue to follow pt during this hospitalization. Pt will follow Nephrologist Dr Desouza as Outpt whren discharged for possible dialysis treatments.  Future Appointments   Date Time Provider Department Center   10/26/2023  3:00 PM ROBERT Marino MD [facility]      10/24/23 1000   Rounds   Attendance Provider;Nurse    Discharge Plan A Home;Home with family   Why the patient remains in the hospital Requires continued medical care   Transition of Care Barriers None

## 2023-10-24 NOTE — PROGRESS NOTES
"[facility]    CC: anemia    HPI 53 y.o. male severe obesity (BMI 37), HTN, DM was transferred from [facility] to [facility] for CKD, HTN emergency and severe anemia. GI consulted for anemia.    Patient in normal state of health until a week ago started noticing blurry vision. Was seen by PCP found to have abnormal labs and severe HTN (sBP 229), hgb 6.5, Cr 11. He was treated at [facility] ED for HTN emergency with cardine drip and given 1u prbc. Transferred to [facility] for nephrology evaluation.     No overt GI bleeding ever. He has regular BM/s every day that are soft and brown. Has some constipation over the past 48 hours. Given bentyl recently for abdominal pain which helps. Never had colonoscopy. Denies any NSAID use. No FDR with hx of CRC. He specifically denies heartburn or epigastric pain. Abdominal pain is LLQ and not associated with meals.    He received 2 u pRBC today. Follow up hgb pending. CTAP shows no abdominal abnormality. He continues to make plenty of urine. Never on dialysis. HD stable    Interval History:  Doing well today. Abdominal pain and constipation resolved. Responsive to transfusion    Past Medical History  Past Medical History:   Diagnosis Date    CKD (chronic kidney disease), stage V 10/21/2023    Diabetes mellitus     Hypertension     Urinary tract infection          Review of Systems  As per HPI    Physical Examination  BP (!) 141/71 (BP Location: Right arm, Patient Position: Lying)   Pulse 68   Temp 98.6 °F (37 °C) (Oral)   Resp 18   Ht 5' 7" (1.702 m)   Wt 109.3 kg (241 lb)   SpO2 95%   BMI 37.75 kg/m²   General appearance: alert, cooperative, no distress  HENT: Normocephalic, atraumatic. Anicteric sclera. No sublingual jaundice.  Lungs:  Normal work of breathing  Abdomen: Soft, non-tender; bowel sounds normoactive; no organomegaly  Skin: No jaundice  Extremities:  No edema  Neurologic: Alert and oriented    Labs: Hgb 6.4, Cr 11    Imaging: CTAP " without abdominal abnormality. GB absent. Biliary ducts not distended. Pancreas normal. Some stool burden    I have personally reviewed and interpreted these images.    Assessment: 53 y.o. male severe obesity (BMI 37), HTN, DM was transferred from [facility] to [facility] for CKD, HTN emergency and severe anemia. GI consulted for anemia. Nephrology on board. No sign of GI bleeding ever.    Anemia of Chronic Kidney Disease with acute injury  LLQ abdominal pain  Constipation  Found to have HTN emergency (sBP 229) when establishing with PCP with Cr 11, and hgb 6.5 s/p 1U pRBC at OSH. Reassuring UOP. Transferred to [facility] for Nephro evaluation. No signs of GI bleeding this admission. CTAP with normal biliary tree, absent GB, mild stool burden; no abnomional abnormality. Suspect anemia is 2/2 AoCD in setting of acute on chronic kidney disease vs impending renal failure. No history of CRC screening. No FDR with CRC. Does not take NSAIDs.    Recommending:  - Agree with Nephrology consult and outpatient follow up  - Regular PCP follow up is imperative for HTN and comorbidities   - Transfusion responsive  - Miralax scheduled daily for constipation  - Needs regular average risk CRC screening on discharge  - EGD/Colon as outpatient to complete anemia workup    Discussed with Dr. Carine Fry MD  Gastroenterology Fellow - [facility]

## 2023-10-24 NOTE — PROGRESS NOTES
Nephrology Progress Note       Consult Requested By: Nico Valenzuela MD  Reason for Consult: NESTOR on CKD5     SUBJECTIVE:        ?    Review of Systems   Constitutional:  Negative for chills and fever.   HENT:  Negative for congestion and sore throat.    Eyes:  Negative for blurred vision, double vision and photophobia.   Respiratory:  Negative for cough and shortness of breath.    Cardiovascular:  Negative for chest pain, palpitations and leg swelling.   Gastrointestinal:  Negative for abdominal pain, diarrhea, nausea and vomiting.   Genitourinary:  Negative for dysuria and urgency.   Musculoskeletal:  Negative for joint pain and myalgias.   Skin:  Negative for itching and rash.   Neurological:  Negative for dizziness, sensory change, weakness and headaches.   Endo/Heme/Allergies:  Negative for polydipsia. Does not bruise/bleed easily.   Psychiatric/Behavioral:  Negative for depression.        Past Medical History:   Diagnosis Date    CKD (chronic kidney disease), stage V 10/21/2023    Diabetes mellitus     Hypertension     Urinary tract infection           OBJECTIVE:     Vital Signs (Most Recent)  Vitals:    10/24/23 0822 10/24/23 0845 10/24/23 1204 10/24/23 1206   BP: 135/69   (!) 141/71   BP Location: Right arm   Right arm   Patient Position: Lying   Lying   Pulse: 68  67 68   Resp: 20   18   Temp: 97.6 °F (36.4 °C)   98.6 °F (37 °C)   TempSrc: Oral   Oral   SpO2: 95% 96%  95%   Weight:       Height:                       Medications:   amLODIPine  10 mg Oral Daily    calcium acetate(phosphat bind)  667 mg Oral TID WM    carvediloL  25 mg Oral BID    ergocalciferol  50,000 Units Oral Q7 Days    hydrALAZINE  50 mg Oral Q8H    mupirocin   Nasal BID    sodium bicarbonate  650 mg Oral TID    sodium zirconium cyclosilicate  10 g Oral Daily           Physical Exam  Vitals and nursing note reviewed.   Constitutional:       General: He is not in acute distress.     Appearance: He is obese. He is not diaphoretic.   HENT:       Head: Normocephalic and atraumatic.      Mouth/Throat:      Pharynx: No oropharyngeal exudate.   Eyes:      General: No scleral icterus.     Conjunctiva/sclera: Conjunctivae normal.      Pupils: Pupils are equal, round, and reactive to light.   Cardiovascular:      Rate and Rhythm: Normal rate and regular rhythm.      Heart sounds: Normal heart sounds. No murmur heard.  Pulmonary:      Effort: Pulmonary effort is normal. No respiratory distress.      Breath sounds: Normal breath sounds.   Abdominal:      General: Bowel sounds are normal. There is no distension.      Palpations: Abdomen is soft.      Tenderness: There is no abdominal tenderness.   Musculoskeletal:         General: Normal range of motion.      Cervical back: Normal range of motion and neck supple.   Skin:     General: Skin is warm and dry.      Findings: No erythema.   Neurological:      Mental Status: He is alert and oriented to person, place, and time.      Cranial Nerves: No cranial nerve deficit.   Psychiatric:         Mood and Affect: Affect normal.         Cognition and Memory: Memory normal.         Judgment: Judgment normal.         Laboratory:  Recent Labs   Lab 10/20/23  1609 10/21/23  0309 10/22/23  0402 10/23/23  0319 10/24/23  1122   WBC 9.23   < > 9.22 10.07 8.84   HGB 6.5*   < > 6.4* 7.7* 8.1*   HCT 20.0*   < > 19.6* 23.8* 24.5*      < > 223 234 234   MONO 8.5  0.8  --   --   --  9.2  0.8   EOSINOPHIL 3.9  --   --   --  2.9    < > = values in this interval not displayed.       Recent Labs   Lab 10/21/23  0309 10/22/23  0402 10/23/23  0319 10/24/23  0852    139 138 138   K 4.9 5.5* 5.3* 5.2*    111* 108 107   CO2 14* 15* 15* 9*   BUN 92* 96* 104* 112*   CREATININE 10.6* 11.2* 11.3* 12.0*   CALCIUM 7.9* 8.2* 8.0* 8.2*   PHOS 8.4*  --   --  9.4*         Diagnostic Results:  X-Ray: Reviewed  US: Reviewed  Echo: Reviewed  ASSESSMENT/PLAN:     1. NESTOR - ? CKD5 Cr 2020 was 0.9 on 3/23 Cr up to 4.4  no Kidney biopsy was  performed at that time  Now Cr 11.2 in the setting of sever Anemia Hb 6.5   -- No need for Kidney biopsy at this time - less likely would be informative.  -- as of now No Indication for RRT at this time has blood loss ? GIB  No Uremia symptoms.  -- US kidney - No hydronephrosis.   UA + 3 prot get UPCR, Many bacteria asymptomatic  would not  treat   Has lungs changes on CT get immunological work up  : MICHAEL, C3/C4, CH50, ANCA, MPO, PR3, ESR, CRP, RF, RPR,   SPEP, UPEP,   serum free light chains  - however may be obscured by blood transfusion - so far non contributory some still pending      Most likely  his Kidney failure just a progression of his not controlled DM/HTN   Most likely need Dialysis very soon his Nephrologist Marisabel Desouza in [facility]  Notified.   For now try to stabilize with IV bicarbonate for 10 Hr  + PO, Lokelma,     -- Daily Renal Function Panel  -- Avoid Hypotension.  -- Renally dose all meds  -- Please avoid nephrotoxins, including NSAIDs, aminoglycosides, IV contrast (unless absolutely necessary), gadolinium, fleets and other phosphorous-based laxatives. Caution with antibiotics.    Hyperkalemia   -- Low K+ diet   -- Lokelma 10 daily     2. HTN (I10) -  controlled now  With amlodipine 10   Coreg 25 BID  Hydralazine 50 TID     3. Anemia of chronic kidney disease   -- Transfuse <7   GI consulted   Hb stable   Recent Labs   Lab 10/22/23  0402 10/23/23  0319 10/24/23  1122   HGB 6.4* 7.7* 8.1*   HCT 19.6* 23.8* 24.5*    234 234         Iron   Lab Results   Component Value Date    IRON 28 (L) 10/21/2023    TIBC 247 (L) 10/21/2023    FERRITIN 433 (H) 10/21/2023       4. MBD (E88.9 M90.80) -   PTh  elevated Vit D  low   Recent Labs   Lab 10/22/23  1227 10/23/23  0319 10/24/23  0852   .6*  --   --    CALCIUM  --    < > 8.2*   PHOS  --   --  9.4*    < > = values in this interval not displayed.       Recent Labs   Lab 10/20/23  1609 10/21/23  0309   MG 1.9 1.8         Lab Results   Component Value  Date    .6 (H) 10/22/2023    CALCIUM 8.2 (L) 10/24/2023    PHOS 9.4 (HH) 10/24/2023    Binders phoslo TID titrate as needed   Lab Results   Component Value Date    [id number] 16 (L) 10/22/2023     Acidosis - start PO bicarbonate 650 TID titrate as needed + IV for 10 hr   Lab Results   Component Value Date    CO2 9 (LL) 10/24/2023       5. Nutrition/Hypoalbuminemia   Recent Labs   Lab 10/23/23  0319 10/24/23  0852   ALBUMIN 2.4* 2.7*       -- UPCR 5g mainly alb       Thank you for allowing me to participate in care of your patient  With any question please call   Glenn Mesa MD     [facility]  MUKUL Cabrera MD,   MD MANDA Avery MD E. V. Harmon, NP  [address]   [place], [address] [phone]  After hours answering service: [phone]

## 2023-10-24 NOTE — PLAN OF CARE
1240  CM was informed that the pt's PCP is Dr Yanick Hensley not Dr Abhi Peralta. Previously scheduled appt with Dr Abhi Peralta on 11/3/2023 at 0945 canceled & hospfu appt scheduled with Dr Yanick Hensley on 10/30/2023 at 1430. Information added to the pt's discharge paperwork.     Message sent to Dr Desouza's ([facility])  requesting a hospfu appt. Awaiting response.     1425   & cr 2.0 today. Message sent to Dr Marino's ([facility])  informing of the pt's hospitalization & concern that he might not be able to attend the previously scheduled appt on 10/26/2023 at 1500. Awaiting response.

## 2023-10-24 NOTE — PROGRESS NOTES
[facility] Medicine  Progress Note    Patient Name: Wai Bain  MRN: [id number]  Patient Class: IP- Inpatient   Admission Date: 10/21/2023  Length of Stay: 3 days  Attending Physician: Nico Valenzuela MD  Primary Care Provider: Abhi Peralta MD        Subjective:     Principal Problem:CKD (chronic kidney disease), stage V        HPI:  52 YO M morbidly obese, HTN, DM was transferred from [facility] to [facility] for severe CKD, HTN urgency and severe anemia. Per wife for the past 4 months patient did not have insurance and could not afford his medications. He recently had his insurance back and went to see his PCP, labs work done which was abnormal and was called to go to [facility] ED. Then in [facility] ED BP was found to be systolic >229, labs with Hg 6.5. he stated due to his diabetes he was having blurry vision and had scheduled monthly injection which also he missed for the past few months. Per wife he started to have kidney problem four months ago and has not been follow up with nephrologist since then. When seen with notion of dry cough, reflux acid, associated with epigastric pain. Denies typical CP or SOB, no palpitation. In [facility] ED he was placed on cardene drip and received one unit of PRBC.       Overview/Hospital Course:  No notes on file    Interval History:   Seen and examined stated feeling much better, but still has electrolytes abnormalities, no fever, no CP or SOB. Denies abdominal pain, no diarrhea, or constipation. Per wife has good urine output.    Review of Systems   Constitutional:  Negative for activity change, appetite change, chills and fever.   HENT:  Negative for congestion.    Respiratory:  Negative for chest tightness, shortness of breath and wheezing.    Cardiovascular:  Negative for chest pain, palpitations and leg swelling.   Gastrointestinal:  Negative for abdominal pain, diarrhea, nausea and vomiting.   Musculoskeletal:  Negative for  gait problem and joint swelling.   Neurological:  Negative for dizziness, weakness and headaches.   Psychiatric/Behavioral:  Negative for agitation, confusion and hallucinations.    All other systems reviewed and are negative.    Objective:     Vital Signs (Most Recent):  Temp: 98.6 °F (37 °C) (10/24/23 1206)  Pulse: 68 (10/24/23 1206)  Resp: 18 (10/24/23 1206)  BP: (!) 141/71 (10/24/23 1206)  SpO2: 95 % (10/24/23 1206) Vital Signs (24h Range):  Temp:  [97.6 °F (36.4 °C)-98.6 °F (37 °C)] 98.6 °F (37 °C)  Pulse:  [62-71] 68  Resp:  [18-20] 18  SpO2:  [95 %-98 %] 95 %  BP: (131-174)/(63-82) 141/71     Weight: 109.3 kg (241 lb)  Body mass index is 37.75 kg/m².    Intake/Output Summary (Last 24 hours) at 10/24/2023 1227  Last data filed at 10/23/2023 1844  Gross per 24 hour   Intake 240 ml   Output 400 ml   Net -160 ml         Physical Exam  Vitals and nursing note reviewed.   Constitutional:       General: He is not in acute distress.     Appearance: He is obese.   HENT:      Head: Normocephalic and atraumatic.      Nose: No congestion.      Mouth/Throat:      Mouth: Mucous membranes are moist.      Pharynx: No oropharyngeal exudate.   Eyes:      Extraocular Movements: Extraocular movements intact.      Pupils: Pupils are equal, round, and reactive to light.   Cardiovascular:      Rate and Rhythm: Normal rate and regular rhythm.      Heart sounds: No murmur heard.     No friction rub. No gallop.   Pulmonary:      Effort: Pulmonary effort is normal. No respiratory distress.      Breath sounds: No wheezing or rales.   Chest:      Chest wall: No tenderness.   Abdominal:      General: Bowel sounds are normal. There is no distension.      Palpations: Abdomen is soft.      Tenderness: There is no abdominal tenderness. There is no guarding or rebound.   Musculoskeletal:         General: No swelling or tenderness.      Cervical back: No rigidity or tenderness.      Right lower leg: No edema.      Left lower leg: No edema.  "  Skin:     Findings: No erythema.   Neurological:      General: No focal deficit present.      Mental Status: He is alert and oriented to person, place, and time.      Cranial Nerves: No cranial nerve deficit.      Motor: No weakness.      Coordination: Coordination normal.      Gait: Gait normal.   Psychiatric:         Thought Content: Thought content normal.             Significant Labs: All pertinent labs within the past 24 hours have been reviewed.  BMP:   Recent Labs   Lab 10/24/23  1122   *      K 5.1      CO2 11*   *   CREATININE 12.0*   CALCIUM 8.2*     CBC:   Recent Labs   Lab 10/23/23  0319 10/24/23  1122   WBC 10.07 8.84   HGB 7.7* 8.1*   HCT 23.8* 24.5*    234     Lipid Panel: No results for input(s): "CHOL", "HDL", "LDLCALC", "TRIG", "CHOLHDL" in the last 48 hours.  Troponin: No results for input(s): "TROPONINI", "TROPONINIHS" in the last 48 hours.  TSH:   Recent Labs   Lab 10/21/23  0433   TSH 1.153     Recent Lab Results  (Last 5 results in the past 24 hours)        10/24/23  1208   10/24/23  1122   10/24/23  0852   10/24/23  0535   10/24/23  0019        Urine Protein, Timed               Albumin   2.6   2.7           ALP   51             ALT   13             Anion Gap   20   22           AST   14             Baso #   0.02             Basophil %   0.2             BILIRUBIN TOTAL   0.4  Comment: For infants and newborns, interpretation of results should be based  on gestational age, weight and in agreement with clinical  observations.    Premature Infant recommended reference ranges:  Up to 24 hours.............<8.0 mg/dL  Up to 48 hours............<12.0 mg/dL  3-5 days..................<15.0 mg/dL  6-29 days.................<15.0 mg/dL               BUN   112   112           Calcium   8.2   8.2           Chloride   106   107           CO2   11   9  Comment: CO2 and phosphorus critical result(s) called and verbal readback   obtained from [facility].  by DARRYL " 10/24/2023 11:37             Creatinine   12.0   12.0           Differential Method   Automated             eGFR   5   5           Eos #   0.3             Eosinophil %   2.9             Glucose   138   147           Gran # (ANC)   6.7             Gran %   75.7             Hematocrit   24.5             Hemoglobin   8.1             Immature Grans (Abs)   0.07  Comment: Mild elevation in immature granulocytes is non specific and   can be seen in a variety of conditions including stress response,   acute inflammation, trauma and pregnancy. Correlation with other   laboratory and clinical findings is essential.               Immature Granulocytes   0.8             Lymph #   1.0             Lymph %   11.2             MCH   27.0             MCHC   33.1             MCV   82             Mono #   0.8             Mono %   9.2             MPV   8.6             nRBC   0             Phosphorus Level     9.4  Comment: CO2 and phosphorus critical result(s) called and verbal readback   obtained from [facility].  by DARRYL 10/24/2023 11:37             Platelet Count   234             POCT Glucose 123       121   110       Potassium   5.1   5.2           PROTEIN TOTAL   6.3             PROTEIN URINE               RBC   3.00             RDW   13.0             Sodium   137   138           Urine Collection Duration               Urine Total Volume               WBC   8.84                                    Significant Imaging: I have reviewed all pertinent imaging results/findings within the past 24 hours.      Assessment/Plan:      * CKD (chronic kidney disease), stage V  -likely ESRD  -consult nephrology and F/U recommendation      Disorder of electrolytes  Cont replacement      Anemia  Consult GI :   Needs EGD/colonoscopy - can be outpatient vs inpatient pending clinical course. Monitor for overt GI bleeding.      Hyperkalemia  Shift       Severe obesity (BMI >= 40)  Body mass index is 37.75 kg/m². Morbid obesity complicates all  aspects of disease management from diagnostic modalities to treatment. Weight loss encouraged and health benefits explained to patient.         Cough in adult  -cont monitor  -will give robitussin  CT A/P  Abnormal appearance of the lung parenchyma with several patchy subsegmental areas of ground-glass opacities more so centrally within the bilateral upper and lower lung zones concerning for an underlying infectious process.  Bilateral pleural effusion.  Subcentimeter air collection adjacent to the proximal trachea and esophagus could represent a tracheocele or a small duodenal diverticulum.     Mild stranding adjacent to the bilateral kidneys, nonspecific, can be associated with an underlying inflammatory process of the kidneys, to correlate with urinalysis.    Anemia due to chronic kidney disease  -S/P one unit of PRBC, repeat H/H  - consent signed  -monitor H/H and monitor for bleeding  -will get anemia profile  CT A/P  Abnormal appearance of the lung parenchyma with several patchy subsegmental areas of ground-glass opacities more so centrally within the bilateral upper and lower lung zones concerning for an underlying infectious process.  Bilateral pleural effusion.  Subcentimeter air collection adjacent to the proximal trachea and esophagus could represent a tracheocele or a small duodenal diverticulum.     Mild stranding adjacent to the bilateral kidneys, nonspecific, can be associated with an underlying inflammatory process of the kidneys, to correlate with urinalysis.  Consult ID- T-Spot, Urine histoplasma and Blastomyces Ag, HIV and RPR.   Please confer with pulmonology  Consult GI- appreciates rec's    Hypertensive retinopathy, bilateral  -notion of eye injection monthly with blurry vision  -will consult ophthalmology or outpatient fu      Microscopic hematuria  -persist  -defer to urology Vs nephrology       Essential hypertension  -resume home meds with coreg and add amlodipine      Type 2 diabetes  mellitus with hyperglycemia, without long-term current use of insulin  -monitor FS and start ISS  - A1C 5.6        VTE Risk Mitigation (From admission, onward)         Ordered     IP VTE HIGH RISK PATIENT  Once         10/21/23 0245     Place sequential compression device  Until discontinued         10/21/23 0245                Discharge Planning   [place]: 10/27/2023     Code Status: Full Code   Is the patient medically ready for discharge?:     Reason for patient still in hospital (select all that apply): Patient trending condition and Treatment  Discharge Plan A: Home, Home with family          Time spent > 30 min        Nico Valenzuela MD  Department of Hospital Medicine   [place] [facility]

## 2023-10-24 NOTE — CONSULTS
[facility] Pul[facility] Medicine Consult Note    Primary Attending Physician: Dr. Valenzuela  Consultant Attending: Dr. Bender  Consultant Fellow: Dr. aHrley    Reason for Consult:     Bilateral pleural effusions     Subjective:      History of Present Illness:  Wai Bain is a 53 y.o male with pmh of DMT2, HTN who presented to the ED as advised by PCP for abnormal labs and elevated pressure SBP>220. Denied chest pain, dyspnea, orthopnea, PND. Lab work concerning for anemia, hg<7, with abnormal renal function, creatinine >10. Denied dizziness, fatigue, hematemesis, hematochezia, melena. No other known sources of blood loss. Does endorse feeling weak over the last few weeks, no falls. He has never had colonoscopy or EGD.      Wife states he was on metformin before for diabetes, but was taken off because it altered renal function. Denies HD. She states the metformin was stopped at that time and never resumed. No history of recurrent NSAID use. Denies changes in urinary frequency, no changes in output. Of note he hasn't had insurance and has been without medications for months. Blood pressure is usually greater than 160/90. Denies chest pain, swelling, orthopnea, headache. He does have blurry vision which is chronic and followed outpatient with optometrist    UPDATE: Evaluated for unexplained anemia. Has CKD5, however drop in hg thought to be acute vs chronic. Unable to access outpatient CBC. Denied dizziness, fatigue, hematemesis, hematochezia, melena. No other known sources of blood loss. While evaluating anemia, CT was significant to bilateral pleural effusions and medial, bilateral ground glass opacities. Work includes construction and . Endorses non-productive cough usually only present at night. No dyspnea, orthopnea or PND. Denies history of smoking. No known sick contacts or recent travel history. Denies weight loss.     Past Medical History:  Past Medical History:   Diagnosis Date    CKD (chronic  kidney disease), stage V 10/21/2023    Diabetes mellitus     Hypertension     Urinary tract infection        Past Surgical History:  Past Surgical History:   Procedure Laterality Date    ESOPHAGOGASTRODUODENOSCOPY N/A 3/13/2020    Procedure: EGD (ESOPHAGOGASTRODUODENOSCOPY);  Surgeon: Katiuska العراقي MD;  Location: [facility] ENDO;  Service: Endoscopy;  Laterality: N/A;    LAPAROSCOPIC CHOLECYSTECTOMY N/A 6/22/2020    Procedure: CHOLECYSTECTOMY, LAPAROSCOPIC;  Surgeon: Dayron Conner MD;  Location: [facility] OR;  Service: General;  Laterality: N/A;       Allergies:  Review of patient's allergies indicates:  No Known Allergies    Medications:   In-Hospital Scheduled Medications:   amLODIPine  10 mg Oral Daily    calcium acetate(phosphat bind)  667 mg Oral TID WM    carvediloL  25 mg Oral BID    ergocalciferol  50,000 Units Oral Q7 Days    hydrALAZINE  50 mg Oral Q8H    mupirocin   Nasal BID    sodium bicarbonate  650 mg Oral TID    sodium zirconium cyclosilicate  10 g Oral Daily      In-Hospital PRN Medications:  0.9%  NaCl infusion (for blood administration), 0.9%  NaCl infusion (for blood administration), acetaminophen, dextrose 10%, dextrose 10%, glucagon (human recombinant), glucose, glucose, guaiFENesin 100 mg/5 ml, insulin aspart U-100, melatonin, naloxone, ondansetron, sodium chloride 0.9%, sodium chloride 0.9%   In-Hospital IV Infusion Medications:     Home Medications:  Prior to Admission medications    Medication Sig Start Date End Date Taking? Authorizing Provider   blood sugar diagnostic Strp USE TO TEST BLOOD SUGAR ONCE DAILY 1/14/20   Dayron Buckner MD   blood-glucose meter kit USE AS DIRECTED 1/14/20 4/3/23  Dayron Buckner MD   dicyclomine (BENTYL) 20 mg tablet Take 20 mg by mouth every 6 (six) hours. 10/16/23   Provider, Historical   metoprolol succinate (TOPROL-XL) 50 MG 24 hr tablet Take 50 mg by mouth once daily. 10/16/23   Provider, Historical       Family History:  Family History   Problem Relation Age  "of Onset    Prostate cancer Neg Hx     Kidney disease Neg Hx        Social History:  Social History     Tobacco Use    Smoking status: Former     Types: Cigarettes    Smokeless tobacco: Never    Tobacco comments:     quit "long time ago"   Substance Use Topics    Alcohol use: Yes     Alcohol/week: 1.0 standard drink of alcohol     Types: 1 Cans of beer per week     Comment: social    Drug use: Never       Review of Systems:  All other systems are reviewed and are negative.     Objective:   Last 24 Hour Vital Signs:  BP  Min: 127/63  Max: 174/82  Temp  Av.7 °F (36.5 °C)  Min: 97 °F (36.1 °C)  Max: 98.1 °F (36.7 °C)  Pulse  Av.5  Min: 62  Max: 71  Resp  Av.7  Min: 18  Max: 20  SpO2  Av.3 %  Min: 95 %  Max: 98 %  I/O last 3 completed shifts:  In: 615 [P.O.:615]  Out: 1375 [Urine:1375]    Physical Examination:  Constitutional:       General: He is not in acute distress.     Appearance: Normal appearance. He is obese. He is not ill-appearing.   HENT:      Head: Normocephalic.      Nose: No congestion or rhinorrhea.   Eyes:      General: No scleral icterus.        Right eye: No discharge.         Left eye: No discharge.   Cardiovascular:      Rate and Rhythm: Normal rate.      Pulses: Normal pulses.      Heart sounds: Normal heart sounds. No murmur heard.  Pulmonary:      Effort: Pulmonary effort is normal.      Breath sounds: Normal breath sounds. No wheezing.   Abdominal:      Tenderness: There is no abdominal tenderness. There is no right CVA tenderness, left CVA tenderness, guarding or rebound.   Musculoskeletal:         General: No swelling, tenderness, deformity or signs of injury.      Right lower leg: No edema.      Left lower leg: No edema.   Skin:     Capillary Refill: Capillary refill takes less than 2 seconds.      Coloration: Skin is not jaundiced or pale.      Findings: No erythema or rash.   Neurological:      General: No focal deficit present.      Mental Status: He is alert and " oriented to person, place, and time.      Motor: No weakness.   Psychiatric:         Mood and Affect: Mood normal.         Behavior: Behavior normal.     Laboratory:  Trended Lab Data:  Recent Labs     10/22/23  0402 10/23/23  0319   WBC 9.22 10.07   HGB 6.4* 7.7*   HCT 19.6* 23.8*    234    138   K 5.5* 5.3*   * 108   CO2 15* 15*   BUN 96* 104*   CREATININE 11.2* 11.3*   * 110   BILITOT 0.4 0.5   AST 14 13   ALT 12 10   ALKPHOS 52* 54*   CALCIUM 8.2* 8.0*   ALBUMIN 2.4* 2.4*   PROT 6.1 5.9*       Cardiac:   Recent Labs   Lab 10/21/23  0756   *       Urinalysis:   Lab Results   Component Value Date    LABURIN No growth 01/21/2020    COLORU Yellow 10/21/2023    SPECGRAV 1.025 10/21/2023    NITRITE Negative 10/21/2023    KETONESU Negative 10/21/2023    UROBILINOGEN Negative 10/21/2023       Microbiology:  Microbiology Results (last 7 days)       Procedure Component Value Units Date/Time    Blood culture [[id number]] Collected: 10/21/23 0756    Order Status: Completed Specimen: Blood Updated: 10/23/23 1822     Blood Culture, Routine No Growth to date      No Growth to date      No Growth to date    Blood culture [[id number]] Collected: 10/21/23 0756    Order Status: Completed Specimen: Blood Updated: 10/23/23 1822     Blood Culture, Routine No Growth to date      No Growth to date      No Growth to date            Radiology:  I have personally reviewed the above labs and imaging.    Current Medications:     Infusions:       Scheduled:   amLODIPine  10 mg Oral Daily    calcium acetate(phosphat bind)  667 mg Oral TID WM    carvediloL  25 mg Oral BID    ergocalciferol  50,000 Units Oral Q7 Days    hydrALAZINE  50 mg Oral Q8H    mupirocin   Nasal BID    sodium bicarbonate  650 mg Oral TID    sodium zirconium cyclosilicate  10 g Oral Daily        PRN:  0.9%  NaCl infusion (for blood administration), 0.9%  NaCl infusion (for blood administration), acetaminophen, dextrose 10%, dextrose 10%,  glucagon (human recombinant), glucose, glucose, guaiFENesin 100 mg/5 ml, insulin aspart U-100, melatonin, naloxone, ondansetron, sodium chloride 0.9%, sodium chloride 0.9%     Assessment:     Wai Bain is a 53 y.o. male with:  Patient Active Problem List    Diagnosis Date Noted    Hyperkalemia 10/22/2023    Anemia 10/22/2023    Anemia due to chronic kidney disease 10/21/2023    CKD (chronic kidney disease), stage V 10/21/2023    Cough in adult 10/21/2023    Severe obesity (BMI >= 40) 10/21/2023    Type 2 diabetes mellitus with both eyes affected by proliferative retinopathy and macular edema, without long-term current use of insulin 01/05/2023    Hypertensive retinopathy, bilateral 01/05/2023    NS (nuclear sclerosis), bilateral 01/05/2023    Cholelithiasis 06/22/2020    RUQ pain 03/13/2020    Early satiety 03/10/2020    RUQ abdominal pain 01/21/2020    Right flank pain 01/21/2020    Microscopic hematuria 01/21/2020    Type 2 diabetes mellitus with hyperglycemia, without long-term current use of insulin 01/14/2020    Essential hypertension 01/14/2020        Plan:   Pulm:   Infectious (viral/bacterial) etiology vs pulmonary edema 2/2 volume overload (hx of CKD, HF?) vs malignant pleural effusion  Given history of CKD and bilateral pleural effusions with associated medial bilateral ground glass appearance without leukocytosis, fever, and chills this likely related to fluid overload. Recommend diuresis and assess for improvement. Less likely to bacterial without focal opacities. No concern for malignant process as no lymphadenopathy, night sweats, weight loss, fever. Also does not have nodules or extrapulmonary mass or malignancy/lymphadenopathy.   -Diuresis  -CTM to monitor for fever, chills, productive cough, decompensation.   -if does not improve with diuresis, consider inflammatory/autoimmune processes       Gretel Stanton MD  [employer] [facility] Medicine Fellow

## 2023-10-24 NOTE — PROGRESS NOTES
Progress Note  [facility]      Consulting Physician: Nico Valenzuela MD  Date of Admission: 10/21/2023   Reason for Consult: Abnormal CT findings    ASSESSMENT:   Abnormal findings on CT Chest c/w indolent inflammatory vs infectious process. Infectious history only consistent with cough. VSS. At present, would work up indolent infectious causes that may cause such changes in pulmonary architecture. RPR negative.   Hypertension  Diabetes  CKD  Anemia    RECOMMENDATIONS:   Please obtain T-Spot, serum fungitell.   Pending studies: Urine histoplasma and Blastomyces Ag.   Pending evaluation with pulmonology.     Thanks for this consult!. Please call if you have any questions.    Livier Herrmann MD  Internal Medicine/Pediatrics HO-IV  [facility] ID  [phone] pager    Subjective:     Interval History:  NAEON. Workup in progress. Creatinine remains elevated. Continues with UOP.    Medications reviewed; current antibiotics:  Antibiotics (From admission, onward)      Start     Stop Route Frequency Ordered    10/21/23 0900  mupirocin 2 % ointment         10/26/23 0859 Nasl 2 times daily 10/21/23 0247          Review of Systems   Constitutional:  Negative for chills, fever and malaise/fatigue.   HENT:  Negative for congestion, sinus pain and sore throat.    Respiratory:  Positive for cough. Negative for sputum production and shortness of breath.    Cardiovascular:  Negative for chest pain, palpitations and leg swelling.   Gastrointestinal:  Negative for abdominal pain, diarrhea, nausea and vomiting.   Genitourinary:  Negative for dysuria, frequency and urgency.   Musculoskeletal:  Negative for back pain, joint pain and neck pain.   Skin:  Negative for rash.   Neurological:  Negative for tremors, focal weakness, weakness and headaches.   All other systems reviewed and are negative.      Objective:     Physical Exam:  VS (24h):   Vitals:    10/24/23 0533   BP: (!) 146/69   Pulse: 66   Resp: 18   Temp: 98 °F (36.7 °C)      Temp:  [97 °F (36.1 °C)-98.6 °F (37 °C)]     Physical Exam  Vitals reviewed.   Constitutional:       General: He is not in acute distress.     Appearance: Normal appearance. He is normal weight. He is not ill-appearing or toxic-appearing.   HENT:      Head: Normocephalic and atraumatic.      Nose: Nose normal. No congestion or rhinorrhea.      Mouth/Throat:      Mouth: Mucous membranes are moist.      Pharynx: Oropharynx is clear.   Eyes:      Extraocular Movements: Extraocular movements intact.      Pupils: Pupils are equal, round, and reactive to light.   Cardiovascular:      Rate and Rhythm: Normal rate and regular rhythm.      Pulses: Normal pulses.      Heart sounds: Normal heart sounds. No murmur heard.     No friction rub. No gallop.   Pulmonary:      Effort: Pulmonary effort is normal.      Breath sounds: Normal breath sounds.   Abdominal:      General: Abdomen is flat. Bowel sounds are normal. There is no distension.      Palpations: Abdomen is soft. There is no mass.      Tenderness: There is no abdominal tenderness.   Musculoskeletal:         General: Normal range of motion.      Cervical back: Normal range of motion. No rigidity.      Right lower leg: No edema.      Left lower leg: No edema.   Lymphadenopathy:      Cervical: No cervical adenopathy.   Skin:     General: Skin is warm and dry.      Capillary Refill: Capillary refill takes less than 2 seconds.   Neurological:      General: No focal deficit present.      Mental Status: He is alert and oriented to person, place, and time. Mental status is at baseline.   Psychiatric:         Mood and Affect: Mood normal.         Behavior: Behavior normal.         Thought Content: Thought content normal.         Laboratory  Lab Results   Component Value Date    WBC 10.07 10/23/2023    WBC 9.22 10/22/2023    WBC 11.27 10/21/2023    WBC 11.30 10/21/2023    WBC 9.23 10/20/2023    HGB 7.7 (L) 10/23/2023    HCT 23.8 (L) 10/23/2023    MCV 83 10/23/2023      "10/23/2023     No results for input(s): "GLU", "NA", "K", "CL", "CO2", "BUN", "CREATININE", "CALCIUM", "MG" in the last 24 hours.  Lab Results   Component Value Date    ALT 10 10/23/2023    AST 13 10/23/2023    ALKPHOS 54 (L) 10/23/2023    BILITOT 0.5 10/23/2023       Microbiology (Last 7 Days)  Microbiology Results (last 7 days)       Procedure Component Value Units Date/Time    Blood culture [[id number]] Collected: 10/21/23 0756    Order Status: Completed Specimen: Blood Updated: 10/23/23 1822     Blood Culture, Routine No Growth to date      No Growth to date      No Growth to date    Blood culture [[id number]] Collected: 10/21/23 0756    Order Status: Completed Specimen: Blood Updated: 10/23/23 1822     Blood Culture, Routine No Growth to date      No Growth to date      No Growth to date              ASSESSMENT/PLAN:   See as above.    Active Hospital Problems    Diagnosis  POA    *CKD (chronic kidney disease), stage V [N18.5]  Yes    Hyperkalemia [E87.5]  Yes    Anemia [D64.9]  Yes    Anemia due to chronic kidney disease [N18.9, D63.1]  Yes    Cough in adult [R05.9]  Yes    Severe obesity (BMI >= 40) [E66.01]  Yes    Hypertensive retinopathy, bilateral [H35.033]  Yes    Microscopic hematuria [R31.29]  Yes    Type 2 diabetes mellitus with hyperglycemia, without long-term current use of insulin [E11.65]  Yes    Essential hypertension [I10]  Yes      Resolved Hospital Problems   No resolved problems to display.      "

## 2023-10-24 NOTE — SUBJECTIVE & OBJECTIVE
Interval History:   Seen and examined stated feeling much better, but still has electrolytes abnormalities, no fever, no CP or SOB. Denies abdominal pain, no diarrhea, or constipation. Per wife has good urine output.    Review of Systems   Constitutional:  Negative for activity change, appetite change, chills and fever.   HENT:  Negative for congestion.    Respiratory:  Negative for chest tightness, shortness of breath and wheezing.    Cardiovascular:  Negative for chest pain, palpitations and leg swelling.   Gastrointestinal:  Negative for abdominal pain, diarrhea, nausea and vomiting.   Musculoskeletal:  Negative for gait problem and joint swelling.   Neurological:  Negative for dizziness, weakness and headaches.   Psychiatric/Behavioral:  Negative for agitation, confusion and hallucinations.    All other systems reviewed and are negative.    Objective:     Vital Signs (Most Recent):  Temp: 98.6 °F (37 °C) (10/24/23 1206)  Pulse: 68 (10/24/23 1206)  Resp: 18 (10/24/23 1206)  BP: (!) 141/71 (10/24/23 1206)  SpO2: 95 % (10/24/23 1206) Vital Signs (24h Range):  Temp:  [97.6 °F (36.4 °C)-98.6 °F (37 °C)] 98.6 °F (37 °C)  Pulse:  [62-71] 68  Resp:  [18-20] 18  SpO2:  [95 %-98 %] 95 %  BP: (131-174)/(63-82) 141/71     Weight: 109.3 kg (241 lb)  Body mass index is 37.75 kg/m².    Intake/Output Summary (Last 24 hours) at 10/24/2023 1227  Last data filed at 10/23/2023 1844  Gross per 24 hour   Intake 240 ml   Output 400 ml   Net -160 ml         Physical Exam  Vitals and nursing note reviewed.   Constitutional:       General: He is not in acute distress.     Appearance: He is obese.   HENT:      Head: Normocephalic and atraumatic.      Nose: No congestion.      Mouth/Throat:      Mouth: Mucous membranes are moist.      Pharynx: No oropharyngeal exudate.   Eyes:      Extraocular Movements: Extraocular movements intact.      Pupils: Pupils are equal, round, and reactive to light.   Cardiovascular:      Rate and Rhythm: Normal  "rate and regular rhythm.      Heart sounds: No murmur heard.     No friction rub. No gallop.   Pulmonary:      Effort: Pulmonary effort is normal. No respiratory distress.      Breath sounds: No wheezing or rales.   Chest:      Chest wall: No tenderness.   Abdominal:      General: Bowel sounds are normal. There is no distension.      Palpations: Abdomen is soft.      Tenderness: There is no abdominal tenderness. There is no guarding or rebound.   Musculoskeletal:         General: No swelling or tenderness.      Cervical back: No rigidity or tenderness.      Right lower leg: No edema.      Left lower leg: No edema.   Skin:     Findings: No erythema.   Neurological:      General: No focal deficit present.      Mental Status: He is alert and oriented to person, place, and time.      Cranial Nerves: No cranial nerve deficit.      Motor: No weakness.      Coordination: Coordination normal.      Gait: Gait normal.   Psychiatric:         Thought Content: Thought content normal.             Significant Labs: All pertinent labs within the past 24 hours have been reviewed.  BMP:   Recent Labs   Lab 10/24/23  1122   *      K 5.1      CO2 11*   *   CREATININE 12.0*   CALCIUM 8.2*     CBC:   Recent Labs   Lab 10/23/23  0319 10/24/23  1122   WBC 10.07 8.84   HGB 7.7* 8.1*   HCT 23.8* 24.5*    234     Lipid Panel: No results for input(s): "CHOL", "HDL", "LDLCALC", "TRIG", "CHOLHDL" in the last 48 hours.  Troponin: No results for input(s): "TROPONINI", "TROPONINIHS" in the last 48 hours.  TSH:   Recent Labs   Lab 10/21/23  0433   TSH 1.153     Recent Lab Results  (Last 5 results in the past 24 hours)        10/24/23  1208   10/24/23  1122   10/24/23  0852   10/24/23  0535   10/24/23  0019        Urine Protein, Timed               Albumin   2.6   2.7           ALP   51             ALT   13             Anion Gap   20   22           AST   14             Baso #   0.02             Basophil %   0.2        "      BILIRUBIN TOTAL   0.4  Comment: For infants and newborns, interpretation of results should be based  on gestational age, weight and in agreement with clinical  observations.    Premature Infant recommended reference ranges:  Up to 24 hours.............<8.0 mg/dL  Up to 48 hours............<12.0 mg/dL  3-5 days..................<15.0 mg/dL  6-29 days.................<15.0 mg/dL               BUN   112   112           Calcium   8.2   8.2           Chloride   106   107           CO2   11   9  Comment: CO2 and phosphorus critical result(s) called and verbal readback   obtained from [facility].  by DARRYL 10/24/2023 11:37             Creatinine   12.0   12.0           Differential Method   Automated             eGFR   5   5           Eos #   0.3             Eosinophil %   2.9             Glucose   138   147           Gran # (ANC)   6.7             Gran %   75.7             Hematocrit   24.5             Hemoglobin   8.1             Immature Grans (Abs)   0.07  Comment: Mild elevation in immature granulocytes is non specific and   can be seen in a variety of conditions including stress response,   acute inflammation, trauma and pregnancy. Correlation with other   laboratory and clinical findings is essential.               Immature Granulocytes   0.8             Lymph #   1.0             Lymph %   11.2             MCH   27.0             MCHC   33.1             MCV   82             Mono #   0.8             Mono %   9.2             MPV   8.6             nRBC   0             Phosphorus Level     9.4  Comment: CO2 and phosphorus critical result(s) called and verbal readback   obtained from [facility].  by DARRYL 10/24/2023 11:37             Platelet Count   234             POCT Glucose 123       121   110       Potassium   5.1   5.2           PROTEIN TOTAL   6.3             PROTEIN URINE               RBC   3.00             RDW   13.0             Sodium   137   138           Urine Collection Duration                Urine Total Volume               WBC   8.84                                    Significant Imaging: I have reviewed all pertinent imaging results/findings within the past 24 hours.

## 2023-10-25 VITALS
WEIGHT: 238.63 LBS | RESPIRATION RATE: 20 BRPM | HEART RATE: 68 BPM | HEIGHT: 67 IN | TEMPERATURE: 98 F | DIASTOLIC BLOOD PRESSURE: 61 MMHG | OXYGEN SATURATION: 96 % | BODY MASS INDEX: 37.45 KG/M2 | SYSTOLIC BLOOD PRESSURE: 131 MMHG

## 2023-10-25 LAB
ALBUMIN SERPL BCP-MCNC: 2.4 G/DL (ref 3.5–5.2)
ANCA AB TITR SER IF: NORMAL TITER
ANION GAP SERPL CALC-SCNC: 15 MMOL/L (ref 8–16)
ANTI SM ANTIBODY: 0.11 RATIO (ref 0–0.99)
ANTI-SM INTERPRETATION: NEGATIVE
BASOPHILS # BLD AUTO: 0.03 K/UL (ref 0–0.2)
BASOPHILS NFR BLD: 0.3 % (ref 0–1.9)
BUN SERPL-MCNC: 115 MG/DL (ref 6–20)
CALCIUM SERPL-MCNC: 8.1 MG/DL (ref 8.7–10.5)
CHLORIDE SERPL-SCNC: 105 MMOL/L (ref 95–110)
CO2 SERPL-SCNC: 17 MMOL/L (ref 23–29)
CREAT SERPL-MCNC: 12.3 MG/DL (ref 0.5–1.4)
DIFFERENTIAL METHOD: ABNORMAL
EOSINOPHIL # BLD AUTO: 0.3 K/UL (ref 0–0.5)
EOSINOPHIL NFR BLD: 3.7 % (ref 0–8)
ERYTHROCYTE [DISTWIDTH] IN BLOOD BY AUTOMATED COUNT: 13 % (ref 11.5–14.5)
EST. GFR  (NO RACE VARIABLE): 4 ML/MIN/1.73 M^2
GLUCOSE SERPL-MCNC: 111 MG/DL (ref 70–110)
HCT VFR BLD AUTO: 24.1 % (ref 40–54)
HGB BLD-MCNC: 7.5 G/DL (ref 14–18)
IMM GRANULOCYTES # BLD AUTO: 0.05 K/UL (ref 0–0.04)
IMM GRANULOCYTES NFR BLD AUTO: 0.6 % (ref 0–0.5)
LYMPHOCYTES # BLD AUTO: 0.9 K/UL (ref 1–4.8)
LYMPHOCYTES NFR BLD: 10.9 % (ref 18–48)
MCH RBC QN AUTO: 26.9 PG (ref 27–31)
MCHC RBC AUTO-ENTMCNC: 31.1 G/DL (ref 32–36)
MCV RBC AUTO: 86 FL (ref 82–98)
MONOCYTES # BLD AUTO: 0.8 K/UL (ref 0.3–1)
MONOCYTES NFR BLD: 9.1 % (ref 4–15)
MYELOPEROXIDASE AB SER-ACNC: <0.2 U/ML
NEUTROPHILS # BLD AUTO: 6.5 K/UL (ref 1.8–7.7)
NEUTROPHILS NFR BLD: 75.4 % (ref 38–73)
NRBC BLD-RTO: 0 /100 WBC
P-ANCA TITR SER IF: NORMAL TITER
PATHOLOGIST INTERPRETATION IFE: NORMAL
PATHOLOGIST INTERPRETATION SPE: NORMAL
PHOSPHATE SERPL-MCNC: 9.3 MG/DL (ref 2.7–4.5)
PLATELET # BLD AUTO: 236 K/UL (ref 150–450)
PMV BLD AUTO: 8.8 FL (ref 9.2–12.9)
POCT GLUCOSE: 107 MG/DL (ref 70–110)
POCT GLUCOSE: 108 MG/DL (ref 70–110)
POCT GLUCOSE: 178 MG/DL (ref 70–110)
POTASSIUM SERPL-SCNC: 4.7 MMOL/L (ref 3.5–5.1)
RBC # BLD AUTO: 2.79 M/UL (ref 4.6–6.2)
SODIUM SERPL-SCNC: 137 MMOL/L (ref 136–145)
WBC # BLD AUTO: 8.61 K/UL (ref 3.9–12.7)

## 2023-10-25 PROCEDURE — 25000003 PHARM REV CODE 250: Performed by: FAMILY MEDICINE

## 2023-10-25 PROCEDURE — 25000003 PHARM REV CODE 250: Performed by: STUDENT IN AN ORGANIZED HEALTH CARE EDUCATION/TRAINING PROGRAM

## 2023-10-25 PROCEDURE — 36415 COLL VENOUS BLD VENIPUNCTURE: CPT | Performed by: STUDENT IN AN ORGANIZED HEALTH CARE EDUCATION/TRAINING PROGRAM

## 2023-10-25 PROCEDURE — 85025 COMPLETE CBC W/AUTO DIFF WBC: CPT | Performed by: FAMILY MEDICINE

## 2023-10-25 PROCEDURE — 99232 SBSQ HOSP IP/OBS MODERATE 35: CPT | Mod: ,,, | Performed by: INTERNAL MEDICINE

## 2023-10-25 PROCEDURE — 80069 RENAL FUNCTION PANEL: CPT | Performed by: STUDENT IN AN ORGANIZED HEALTH CARE EDUCATION/TRAINING PROGRAM

## 2023-10-25 PROCEDURE — 94761 N-INVAS EAR/PLS OXIMETRY MLT: CPT

## 2023-10-25 PROCEDURE — 99232 PR SUBSEQUENT HOSPITAL CARE,LEVL II: ICD-10-PCS | Mod: ,,, | Performed by: INTERNAL MEDICINE

## 2023-10-25 RX ORDER — CALCIUM ACETATE 667 MG/1
1334 CAPSULE ORAL
Qty: 180 CAPSULE | Refills: 1 | Status: SHIPPED | OUTPATIENT
Start: 2023-10-26 | End: 2024-02-15

## 2023-10-25 RX ORDER — HYDRALAZINE HYDROCHLORIDE 50 MG/1
50 TABLET, FILM COATED ORAL EVERY 8 HOURS
Qty: 90 TABLET | Refills: 1 | Status: SHIPPED | OUTPATIENT
Start: 2023-10-25 | End: 2024-10-24

## 2023-10-25 RX ORDER — AMLODIPINE BESYLATE 10 MG/1
10 TABLET ORAL DAILY
Qty: 30 TABLET | Refills: 1 | Status: SHIPPED | OUTPATIENT
Start: 2023-10-26 | End: 2024-10-25

## 2023-10-25 RX ORDER — CARVEDILOL 25 MG/1
25 TABLET ORAL 2 TIMES DAILY
Qty: 60 TABLET | Refills: 1 | Status: SHIPPED | OUTPATIENT
Start: 2023-10-25 | End: 2024-10-24

## 2023-10-25 RX ORDER — SODIUM BICARBONATE 650 MG/1
1300 TABLET ORAL 3 TIMES DAILY
Qty: 180 TABLET | Refills: 1 | Status: SHIPPED | OUTPATIENT
Start: 2023-10-25 | End: 2024-10-24

## 2023-10-25 RX ADMIN — SODIUM BICARBONATE 1300 MG: 650 TABLET ORAL at 09:10

## 2023-10-25 RX ADMIN — MUPIROCIN: 20 OINTMENT TOPICAL at 09:10

## 2023-10-25 RX ADMIN — HYDRALAZINE HYDROCHLORIDE 50 MG: 25 TABLET, FILM COATED ORAL at 06:10

## 2023-10-25 RX ADMIN — CALCIUM ACETATE 1334 MG: 667 CAPSULE ORAL at 07:10

## 2023-10-25 RX ADMIN — CARVEDILOL 25 MG: 25 TABLET, FILM COATED ORAL at 09:10

## 2023-10-25 RX ADMIN — CALCIUM ACETATE 1334 MG: 667 CAPSULE ORAL at 11:10

## 2023-10-25 RX ADMIN — HYDRALAZINE HYDROCHLORIDE 50 MG: 25 TABLET, FILM COATED ORAL at 02:10

## 2023-10-25 RX ADMIN — CALCIUM ACETATE 1334 MG: 667 CAPSULE ORAL at 05:10

## 2023-10-25 RX ADMIN — AMLODIPINE BESYLATE 10 MG: 5 TABLET ORAL at 09:10

## 2023-10-25 RX ADMIN — SODIUM BICARBONATE 1300 MG: 650 TABLET ORAL at 02:10

## 2023-10-25 RX ADMIN — SODIUM ZIRCONIUM CYCLOSILICATE 10 G: 5 POWDER, FOR SUSPENSION ORAL at 09:10

## 2023-10-25 NOTE — PROGRESS NOTES
Nephrology Progress Note       Consult Requested By: Rachel Daniel MD  Reason for Consult: NESTOR on CKD5     SUBJECTIVE:        ?    Review of Systems   Constitutional:  Negative for chills and fever.   HENT:  Negative for congestion and sore throat.    Eyes:  Negative for blurred vision, double vision and photophobia.   Respiratory:  Negative for cough and shortness of breath.    Cardiovascular:  Negative for chest pain, palpitations and leg swelling.   Gastrointestinal:  Negative for abdominal pain, diarrhea, nausea and vomiting.   Genitourinary:  Negative for dysuria and urgency.   Musculoskeletal:  Negative for joint pain and myalgias.   Skin:  Negative for itching and rash.   Neurological:  Negative for dizziness, sensory change, weakness and headaches.   Endo/Heme/Allergies:  Negative for polydipsia. Does not bruise/bleed easily.   Psychiatric/Behavioral:  Negative for depression.        Past Medical History:   Diagnosis Date    CKD (chronic kidney disease), stage V 10/21/2023    Diabetes mellitus     Hypertension     Urinary tract infection           OBJECTIVE:     Vital Signs (Most Recent)  Vitals:    10/25/23 0602 10/25/23 0813 10/25/23 0845 10/25/23 1143   BP: 117/69 (!) 157/74  135/64   BP Location:  Right arm     Patient Position: Lying Lying  Sitting   Pulse: 66 69  67   Resp: 18 20  18   Temp: 97.6 °F (36.4 °C) 97.6 °F (36.4 °C)  97.6 °F (36.4 °C)   TempSrc: Oral Oral  Oral   SpO2: 97% 96% 96% 97%   Weight:       Height:             Date 10/25/23 0700 - 10/26/23 0659   Shift 3895-4746 3999-0364 7875-2742 24 Hour Total   INTAKE   P.O. 476   476   Shift Total(mL/kg) 476(4.4)   476(4.4)   OUTPUT   Shift Total(mL/kg)       Weight (kg) 108.2 108.2 108.2 108.2             Medications:   amLODIPine  10 mg Oral Daily    calcium acetate(phosphat bind)  1,334 mg Oral TID WM    carvediloL  25 mg Oral BID    ergocalciferol  50,000 Units Oral Q7 Days    hydrALAZINE  50 mg Oral Q8H    mupirocin   Nasal BID     sodium bicarbonate  1,300 mg Oral TID    sodium zirconium cyclosilicate  10 g Oral Daily           Physical Exam  Vitals and nursing note reviewed.   Constitutional:       General: He is not in acute distress.     Appearance: He is obese. He is not diaphoretic.   HENT:      Head: Normocephalic and atraumatic.      Mouth/Throat:      Pharynx: No oropharyngeal exudate.   Eyes:      General: No scleral icterus.     Conjunctiva/sclera: Conjunctivae normal.      Pupils: Pupils are equal, round, and reactive to light.   Cardiovascular:      Rate and Rhythm: Normal rate and regular rhythm.      Heart sounds: Normal heart sounds. No murmur heard.  Pulmonary:      Effort: Pulmonary effort is normal. No respiratory distress.      Breath sounds: Normal breath sounds.   Abdominal:      General: Bowel sounds are normal. There is no distension.      Palpations: Abdomen is soft.      Tenderness: There is no abdominal tenderness.   Musculoskeletal:         General: Normal range of motion.      Cervical back: Normal range of motion and neck supple.   Skin:     General: Skin is warm and dry.      Findings: No erythema.   Neurological:      Mental Status: He is alert and oriented to person, place, and time.      Cranial Nerves: No cranial nerve deficit.   Psychiatric:         Mood and Affect: Affect normal.         Cognition and Memory: Memory normal.         Judgment: Judgment normal.         Laboratory:  Recent Labs   Lab 10/20/23  1609 10/21/23  0309 10/23/23  0319 10/24/23  1122 10/25/23  0321   WBC 9.23   < > 10.07 8.84 8.61   HGB 6.5*   < > 7.7* 8.1* 7.5*   HCT 20.0*   < > 23.8* 24.5* 24.1*      < > 234 234 236   MONO 8.5  0.8  --   --  9.2  0.8 9.1  0.8   EOSINOPHIL 3.9  --   --  2.9 3.7    < > = values in this interval not displayed.       Recent Labs   Lab 10/21/23  0309 10/22/23  0402 10/24/23  0852 10/24/23  1122 10/25/23  0321      < > 138 137 137   K 4.9   < > 5.2* 5.1 4.7      < > 107 106 105   CO2  14*   < > 9* 11* 17*   BUN 92*   < > 112* 112* 115*   CREATININE 10.6*   < > 12.0* 12.0* 12.3*   CALCIUM 7.9*   < > 8.2* 8.2* 8.1*   PHOS 8.4*  --  9.4*  --  9.3*    < > = values in this interval not displayed.         Diagnostic Results:  X-Ray: Reviewed  US: Reviewed  Echo: Reviewed  ASSESSMENT/PLAN:     1. NESTOR - ? CKD5 Cr 2020 was 0.9 on 3/23 Cr up to 4.4  no Kidney biopsy was performed at that time  Now Cr 11.2 in the setting of sever Anemia Hb 6.5   -- Kidney biopsy at this time - less likely would be informative.  -- as of now No Indication for RRT at this time has blood loss ? GIB  No Uremia symptoms.K+ stable Acidosis better with Bicarb supplement Phos high but started on Binders, his GFR is very low and may need RRT any moment safely would be to place access and start dialysis but Patient wishes to hold off on dialysis as of now   -- US kidney - No hydronephrosis.         Most likely  his Kidney failure just a progression of his not controlled DM/HTN   Most likely need Dialysis very soon his Nephrologist Marisabel Desouza in [facility]  Notified.   Need to be seen in clinic next week         -- Daily Renal Function Panel  -- Avoid Hypotension.  -- Renally dose all meds  -- Please avoid nephrotoxins, including NSAIDs, aminoglycosides, IV contrast (unless absolutely necessary), gadolinium, fleets and other phosphorous-based laxatives. Caution with antibiotics.    Hyperkalemia   -- Low K+ diet   -- Lokelma 10 daily     2. HTN (I10) -  controlled now  With amlodipine 10   Coreg 25 BID  Hydralazine 50 TID     3. Anemia of chronic kidney disease   -- Transfuse <7   GI consulted - defer management to GI     Recent Labs   Lab 10/23/23  0319 10/24/23  1122 10/25/23  0321   HGB 7.7* 8.1* 7.5*   HCT 23.8* 24.5* 24.1*    234 236         Iron   Lab Results   Component Value Date    IRON 28 (L) 10/21/2023    TIBC 247 (L) 10/21/2023    FERRITIN 433 (H) 10/21/2023       4. MBD (E88.9 M90.80) -   PTh  elevated Vit D  low    Recent Labs   Lab 10/22/23  1227 10/23/23  0319 10/25/23  0321   .6*  --   --    CALCIUM  --    < > 8.1*   PHOS  --    < > 9.3*    < > = values in this interval not displayed.       Recent Labs   Lab 10/20/23  1609 10/21/23  0309   MG 1.9 1.8         Lab Results   Component Value Date    .6 (H) 10/22/2023    CALCIUM 8.1 (L) 10/25/2023    PHOS 9.3 (HH) 10/25/2023    Binders phoslo TID  WM   Lab Results   Component Value Date    [id number] 16 (L) 10/22/2023     Acidosis - start PO bicarbonate 1300 TID   Lab Results   Component Value Date    CO2 17 (L) 10/25/2023       5. Nutrition/Hypoalbuminemia   Recent Labs   Lab 10/24/23  1122 10/25/23  0321   ALBUMIN 2.6* 2.4*       -- UPCR 5g mainly alb       Thank you for allowing me to participate in care of your patient  With any question please call   Glenn Mesa MD     [facility]  MUKUL Cabrera MD,   MD MANDA Avery MD E. V. Harmon, NP  [address]   [place], [address] [phone]  After hours answering service: [phone]

## 2023-10-25 NOTE — PLAN OF CARE
Problem: Adult Inpatient Plan of Care  Goal: Plan of Care Review  Outcome: Ongoing, Progressing  Goal: Patient-Specific Goal (Individualized)  Outcome: Ongoing, Progressing  Goal: Absence of Hospital-Acquired Illness or Injury  Outcome: Ongoing, Progressing  Goal: Optimal Comfort and Wellbeing  Outcome: Ongoing, Progressing  Goal: Readiness for Transition of Care  Outcome: Ongoing, Progressing     Problem: Diabetes Comorbidity  Goal: Blood Glucose Level Within Targeted Range  Outcome: Ongoing, Progressing     Problem: Fall Injury Risk  Goal: Absence of Fall and Fall-Related Injury  Outcome: Ongoing, Progressing     Problem: Anemia  Goal: Anemia Symptom Improvement  Outcome: Ongoing, Progressing     Problem: Electrolyte Imbalance (Chronic Kidney Disease)  Goal: Electrolyte Balance  Outcome: Ongoing, Progressing     Problem: Hematologic Alteration (Chronic Kidney Disease)  Goal: Absence of Anemia Signs and Symptoms  Outcome: Ongoing, Progressing     Problem: Renal Function Impairment (Chronic Kidney Disease)  Goal: Minimize Renal Failure Effects  Outcome: Ongoing, Progressing     Problem: Hypertension Acute  Goal: Blood Pressure Within Desired Range  Outcome: Ongoing, Progressing     Problem: Hypertension Comorbidity  Goal: Blood Pressure in Desired Range  Outcome: Ongoing, Progressing

## 2023-10-25 NOTE — PLAN OF CARE
Discharge orders noted. AVS prepared with medication list, importance of medication compliance, follow up appointments, diet, home care instructions, treatment plan, self management, and when to seek medical attention. Detailed clinical reference list attached. AVS printed and handed to patient by bedside nurse. VN reviewed discharge instructions with patient and his wife using teachback method, pt declined translation and allowed pt's wife to interpret.  Allowed time for questions, all questions answered.  Patient and wife verbalized complete understanding of discharge instructions and voices no concerns.      Discharge instructions complete.  Bedside delivery complete.  Transport wheelchair requested.  Bedside nurse notified.

## 2023-10-25 NOTE — PLAN OF CARE
"CM rounded at bedside. Pt resting in bed on room air. Denies any pain or discomfort. His spouse stated " The nephrologist here contacted Dr. Desouza and gave Dr. Desouza my phone to call me for an appt so that she can set up the dialysis for my  if he needs it". Pt hoping to be discharge today to be able to go to his Optometrist tomorrow. CM will continue to follow pt during his hospital stay and will provide any discharge needs and appointments.   Future Appointments   Date Time Provider Department Center   10/26/2023  3:00 PM [facility]      10/25/23 1258   Post-Acute Status   Coverage Medicaid   Hospital Resources/Appts/Education Provided Appointments scheduled and added to AVS   Discharge Delays None known at this time   Discharge Plan   Discharge Plan A Home;Home with family         "

## 2023-10-25 NOTE — PROGRESS NOTES
Progress Note  [facility]      Consulting Physician: Rachel Daniel MD  Date of Admission: 10/21/2023   Reason for Consult: Abnormal CT findings    ASSESSMENT:   Abnormal findings on CT Chest c/w indolent inflammatory vs infectious process. Infectious history only consistent with cough. VSS. At present, would work up indolent infectious causes that may cause such changes in pulmonary architecture. RPR negative.   Hypertension  Diabetes  CKD  Anemia    RECOMMENDATIONS:   Please obtain T-Spot, serum fungitell.   Pending studies: Urine histoplasma and Blastomyces Ag.     Thanks for this consult!. Please call if you have any questions. ID to sign off.     Livier Herrmann MD  Internal Medicine/Pediatrics HO-IV  [facility]  [phone] pager    Subjective:     Interval History:  NAEON. Workup in progress. Creatinine remains elevated. Continues with UOP.    Medications reviewed; current antibiotics:  Antibiotics (From admission, onward)      Start     Stop Route Frequency Ordered    10/21/23 0900  mupirocin 2 % ointment         10/26/23 0859 Nasl 2 times daily 10/21/23 0247          Review of Systems   Constitutional:  Negative for chills, fever and malaise/fatigue.   HENT:  Negative for congestion, sinus pain and sore throat.    Respiratory:  Positive for cough. Negative for sputum production and shortness of breath.    Cardiovascular:  Negative for chest pain, palpitations and leg swelling.   Gastrointestinal:  Negative for abdominal pain, diarrhea, nausea and vomiting.   Genitourinary:  Negative for dysuria, frequency and urgency.   Musculoskeletal:  Negative for back pain, joint pain and neck pain.   Skin:  Negative for rash.   Neurological:  Negative for tremors, focal weakness, weakness and headaches.   All other systems reviewed and are negative.      Objective:     Physical Exam:  VS (24h):   Vitals:    10/25/23 1143   BP: 135/64   Pulse: 67   Resp: 18   Temp: 97.6 °F (36.4 °C)     Temp:  [96.3 °F  (35.7 °C)-97.9 °F (36.6 °C)]     Physical Exam  Vitals reviewed.   Constitutional:       General: He is not in acute distress.     Appearance: Normal appearance. He is normal weight. He is not ill-appearing or toxic-appearing.   HENT:      Head: Normocephalic and atraumatic.      Nose: Nose normal. No congestion or rhinorrhea.      Mouth/Throat:      Mouth: Mucous membranes are moist.      Pharynx: Oropharynx is clear.   Eyes:      Extraocular Movements: Extraocular movements intact.      Pupils: Pupils are equal, round, and reactive to light.   Cardiovascular:      Rate and Rhythm: Normal rate and regular rhythm.      Pulses: Normal pulses.      Heart sounds: Normal heart sounds. No murmur heard.     No friction rub. No gallop.   Pulmonary:      Effort: Pulmonary effort is normal.      Breath sounds: Normal breath sounds.   Abdominal:      General: Abdomen is flat. Bowel sounds are normal. There is no distension.      Palpations: Abdomen is soft. There is no mass.      Tenderness: There is no abdominal tenderness.   Musculoskeletal:         General: Normal range of motion.      Cervical back: Normal range of motion. No rigidity.      Right lower leg: No edema.      Left lower leg: No edema.   Lymphadenopathy:      Cervical: No cervical adenopathy.   Skin:     General: Skin is warm and dry.      Capillary Refill: Capillary refill takes less than 2 seconds.   Neurological:      General: No focal deficit present.      Mental Status: He is alert and oriented to person, place, and time. Mental status is at baseline.   Psychiatric:         Mood and Affect: Mood normal.         Behavior: Behavior normal.         Thought Content: Thought content normal.         Laboratory  Lab Results   Component Value Date    WBC 8.61 10/25/2023    WBC 8.84 10/24/2023    WBC 10.07 10/23/2023    WBC 9.22 10/22/2023    WBC 11.27 10/21/2023    HGB 7.5 (L) 10/25/2023    HCT 24.1 (L) 10/25/2023    MCV 86 10/25/2023     10/25/2023      Recent Labs   Lab 10/25/23  0321   *      K 4.7      CO2 17*   *   CREATININE 12.3*   CALCIUM 8.1*     Lab Results   Component Value Date    ALT 13 10/24/2023    AST 14 10/24/2023    ALKPHOS 51 (L) 10/24/2023    BILITOT 0.4 10/24/2023       Microbiology (Last 7 Days)  Microbiology Results (last 7 days)       Procedure Component Value Units Date/Time    Blood culture [[id number]] Collected: 10/21/23 0756    Order Status: Completed Specimen: Blood Updated: 10/24/23 1822     Blood Culture, Routine No Growth to date      No Growth to date      No Growth to date      No Growth to date    Blood culture [[id number]] Collected: 10/21/23 0756    Order Status: Completed Specimen: Blood Updated: 10/24/23 1822     Blood Culture, Routine No Growth to date      No Growth to date      No Growth to date      No Growth to date              ASSESSMENT/PLAN:   See as above.    Active Hospital Problems    Diagnosis  POA    *CKD (chronic kidney disease), stage V [N18.5]  Yes    Abnormal CT of the chest [R93.89]  Yes    Disorder of electrolytes [E87.8]  Yes    Hyperkalemia [E87.5]  Yes    Anemia [D64.9]  Yes    Anemia due to chronic kidney disease [N18.9, D63.1]  Yes    Cough in adult [R05.9]  Yes    Severe obesity (BMI >= 40) [E66.01]  Yes    Hypertensive retinopathy, bilateral [H35.033]  Yes    Microscopic hematuria [R31.29]  Yes    Type 2 diabetes mellitus with hyperglycemia, without long-term current use of insulin [E11.65]  Yes    Essential hypertension [I10]  Yes      Resolved Hospital Problems   No resolved problems to display.

## 2023-10-25 NOTE — NURSING
Pt is being discharged home. Pharmacy delivered medications to bedside. IV s removed. Cardiac monitor removed. AVS provided and reviewed per VN. Pt refused flu and pneumonia vaccines at this time. Spouse at bedside.

## 2023-10-26 ENCOUNTER — PROCEDURE VISIT (OUTPATIENT)
Dept: OPHTHALMOLOGY | Facility: CLINIC | Age: 53
End: 2023-10-26
Payer: MEDICAID

## 2023-10-26 DIAGNOSIS — H35.033 HYPERTENSIVE RETINOPATHY, BILATERAL: ICD-10-CM

## 2023-10-26 DIAGNOSIS — H43.13 VITREOUS HEMORRHAGE, BILATERAL: ICD-10-CM

## 2023-10-26 DIAGNOSIS — E11.3513 TYPE 2 DIABETES MELLITUS WITH BOTH EYES AFFECTED BY PROLIFERATIVE RETINOPATHY AND MACULAR EDEMA, WITHOUT LONG-TERM CURRENT USE OF INSULIN: Primary | ICD-10-CM

## 2023-10-26 LAB
B DERMAT AG UR QL IA: NOT DETECTED
BACTERIA BLD CULT: NORMAL
BACTERIA BLD CULT: NORMAL
BLASTOMYCES AG RESULT: NOT DETECTED NG/ML
PATHOLOGIST INTERPRETATION UPE: NORMAL
PROT PATTERN UR ELPH-IMP: NORMAL

## 2023-10-26 PROCEDURE — 67028 INJECTION EYE DRUG: CPT | Mod: 50,PBBFAC | Performed by: OPHTHALMOLOGY

## 2023-10-26 PROCEDURE — 92134 CPTRZ OPH DX IMG PST SGM RTA: CPT | Mod: PBBFAC | Performed by: OPHTHALMOLOGY

## 2023-10-26 PROCEDURE — 92134 POSTERIOR SEGMENT OCT RETINA (OCULAR COHERENCE TOMOGRAPHY)-BOTH EYES: ICD-10-PCS | Mod: 26,S$PBB,, | Performed by: OPHTHALMOLOGY

## 2023-10-26 PROCEDURE — 92014 PR EYE EXAM, EST PATIENT,COMPREHESV: ICD-10-PCS | Mod: 25,S$PBB,, | Performed by: OPHTHALMOLOGY

## 2023-10-26 PROCEDURE — 67028 INJECTION EYE DRUG: CPT | Mod: 50,S$PBB,, | Performed by: OPHTHALMOLOGY

## 2023-10-26 PROCEDURE — 92014 COMPRE OPH EXAM EST PT 1/>: CPT | Mod: 25,S$PBB,, | Performed by: OPHTHALMOLOGY

## 2023-10-26 PROCEDURE — 67028 PR INJECT INTRAVITREAL PHARMCOLOGIC: ICD-10-PCS | Mod: 50,S$PBB,, | Performed by: OPHTHALMOLOGY

## 2023-10-26 RX ADMIN — Medication 1.25 MG: at 04:10

## 2023-10-26 NOTE — DISCHARGE SUMMARY
[facility] Medicine  Discharge Summary      Patient Name: Wai Bain  MRN: [id number]  BRICE: [id number]  Patient Class: IP- Inpatient  Admission Date: 10/21/2023  Hospital Length of Stay: 4 days  Discharge Date and Time: 10/25/2023  6:34 PM  Attending Physician: Rachel Daniel MD  Discharging Provider: Elliott Noe NP  Primary Care Provider: Abhi Peralta MD    Primary Care Team: Networked reference to record PCT     HPI:   54 YO M morbidly obese, HTN, DM was transferred from [facility] to [facility] for severe CKD, HTN urgency and severe anemia. Per wife for the past 4 months patient did not have insurance and could not afford his medications. He recently had his insurance back and went to see his PCP, labs work done which was abnormal and was called to go to [facility] ED. Then in [facility] ED BP was found to be systolic >229, labs with Hg 6.5. he stated due to his diabetes he was having blurry vision and had scheduled monthly injection which also he missed for the past few months. Per wife he started to have kidney problem four months ago and has not been follow up with nephrologist since then. When seen with notion of dry cough, reflux acid, associated with epigastric pain. Denies typical CP or SOB, no palpitation. In [facility] ED he was placed on cardene drip and received one unit of PRBC.       * No surgery found *      Hospital Course:   Mr Vergara is a 53 year old male who was transferred to [facility] from [facility] for evaluation of CKDstage 5. Nephrology consulted for further evaluation. No indication for RRT at this time. He was started on Phos binder. He will be continued on Lokelma 10 mg daily. He mostly will need dialysis very soon, his GFR is very low and may need RRT any moment safely would be to place access and start dialysis but patient wish to hold off on dialysis as of now. He was notified of increase risk of not having dialysis by Nephrology.  Patient and wife advised to closely follow up with PCP and Nephrologist as outpatient. He was seen, examined and deemed stable for discharge.        Goals of Care Treatment Preferences:  Code Status: Full Code      Consults:   Consults (From admission, onward)        Status Ordering Provider     Inpatient consult to Pulmonology  Once        Provider:  (Not yet assigned)    Completed ARSALAN TAYLOR NChandrakant     Inpatient consult to Infectious Diseases  Once        Provider:  (Not yet assigned)    Completed ARSALAN TAYLOR NChandrakant     Inpatient consult to Gastroenterology-[facility]  Once        Provider:  (Not yet assigned)    Completed IZABELA TAYLOR     Inpatient consult to Diabetes educator  Once        Provider:  (Not yet assigned)    Completed IZABELA TAYLOR     Inpatient consult to [facility] (Rick Guardado, Geovanna)  Once        Provider:  (Not yet assigned)    Completed IZABELA TAYLOR          No new Assessment & Plan notes have been filed under this hospital service since the last note was generated.  Service: Hospital Medicine    Final Active Diagnoses:    Diagnosis Date Noted POA    PRINCIPAL PROBLEM:  CKD (chronic kidney disease), stage V [N18.5] 10/21/2023 Yes    Abnormal CT of the chest [R93.89] 10/24/2023 Yes    Disorder of electrolytes [E87.8] 10/24/2023 Yes    Hyperkalemia [E87.5] 10/22/2023 Yes    Anemia [D64.9] 10/22/2023 Yes    Anemia due to chronic kidney disease [N18.9, D63.1] 10/21/2023 Yes    Cough in adult [R05.9] 10/21/2023 Yes    Severe obesity (BMI >= 40) [E66.01] 10/21/2023 Yes    Hypertensive retinopathy, bilateral [H35.033] 01/05/2023 Yes    Microscopic hematuria [R31.29] 01/21/2020 Yes    Type 2 diabetes mellitus with hyperglycemia, without long-term current use of insulin [E11.65] 01/14/2020 Yes    Essential hypertension [I10] 01/14/2020 Yes      Problems Resolved During this Admission:       Discharged Condition: stable    Disposition: Home or Self  Care    Follow Up:   Follow-up Information     Dr Yanick Hensley Follow up on 10/30/2023.    Why: at 2:30pm; PCP hospital follow up appointment  Contact information:  [facility] at [address] [place] [address] [phone]           Katiuska Desouza MD Follow up.    Specialty: Nephrology  Why: Patient will be notified of a nephrology [facility] follow up appointment  Contact information:  [address]   [address]  [facility]  [place] [address] [phone]             ROBERT Marino MD Follow up on 10/26/2023.    Specialty: Ophthalmology  Why: at 3:00pm; previously scheduled ophthalmology appointment  Contact information:  [address]  [address]  [place] [address] [phone]                       Patient Instructions:      Diet diabetic     Diet renal     Notify your health care provider if you experience any of the following:  difficulty breathing or increased cough     Notify your health care provider if you experience any of the following:  persistent dizziness, light-headedness, or visual disturbances     Notify your health care provider if you experience any of the following:  increased confusion or weakness     Activity as tolerated       Significant Diagnostic Studies: Labs:   CMP   Recent Labs   Lab 10/24/23  0852 10/24/23  1122 10/25/23  0321    137 137   K 5.2* 5.1 4.7    106 105   CO2 9* 11* 17*   * 138* 111*   * 112* 115*   CREATININE 12.0* 12.0* 12.3*   CALCIUM 8.2* 8.2* 8.1*   PROT  --  6.3  --    ALBUMIN 2.7* 2.6* 2.4*   BILITOT  --  0.4  --    ALKPHOS  --  51*  --    AST  --  14  --    ALT  --  13  --    ANIONGAP 22* 20* 15    and CBC   Recent Labs   Lab 10/24/23  1122 10/25/23  0321   WBC 8.84 8.61   HGB 8.1* 7.5*   HCT 24.5* 24.1*    236       Pending Diagnostic Studies:     Procedure Component Value Units Date/Time    Blastomyces Antigen, Urine [[id number]]  Collected: 10/24/23 1051    Order Status: Sent Lab Status: In process Updated: 10/24/23 1925    Specimen: Urine     Histoplasma Antibody, Serum [[id number]] Collected: 10/24/23 0852    Order Status: Sent Lab Status: In process Updated: 10/24/23 1925    Specimen: Blood     Narrative:      Collection has been rescheduled by [facility]AD at 10/24/2023 08:32 Reason:   Patient unavailable phleb will return     Histoplasma antigen, urine [[id number]] Collected: 10/24/23 1050    Order Status: Sent Lab Status: In process Updated: 10/24/23 1925    Specimen: Urine, Clean Catch     Protein electrophoresis, timed urine [[id number]] Collected: 10/23/23 1850    Order Status: Sent Lab Status: In process Updated: 10/24/23 0041    Specimen: Urine, Clean Catch          Medications:  Reconciled Home Medications:      Medication List      START taking these medications    amLODIPine 10 MG tablet  Commonly known as: NORVASC  Somers carito tableta (10 mg en total) por vía oral diariamente.  (Take 1 tablet (10 mg total) by mouth once daily.)  Start taking on: October 26, 2023     calcium acetate(phosphat bind) 667 mg capsule  Commonly known as: PHOSLO  Take 2 capsules (1,334 mg total) by mouth 3 (three) times daily with meals.  Start taking on: October 26, 2023     carvediloL 25 MG tablet  Commonly known as: COREG  Somers carito tableta (25 mg en total) por vía oral 2 veces al día.  (Take 1 tablet (25 mg total) by mouth 2 (two) times daily.)     hydrALAZINE 50 MG tablet  Commonly known as: APRESOLINE  Take 1 tablet (50 mg total) by mouth every 8 (eight) hours.     LOKELMA 10 gram packet  Generic drug: sodium zirconium cyclosilicate  Take 1 packet (10 g total) by mouth once daily. Mix entire contents of packet(s) into drinking glass containing 3 tablespoons of water; stir well and drink immediately. Add water and repeat until no powder remains to receive entire dose.  Start taking on: October 26, 2023     sodium bicarbonate 650 MG tablet  Somers 2 tabletas  (1,300 mg en total) por vía oral 3 (abhishek) veces al día  (Take 2 tablets (1,300 mg total) by mouth 3 (three) times daily.)        CONTINUE taking these medications    dicyclomine 20 mg tablet  Commonly known as: BENTYL  Take 20 mg by mouth every 6 (six) hours.     ONETOUCH VERIO SYNC kit  Generic drug: blood-glucose meter  Use según las indicaciones.  (USE AS DIRECTED)     ONETOUCH VERIO TEST STRIPS Strp  Generic drug: blood sugar diagnostic  USE TO TEST BLOOD SUGAR ONCE DAILY        STOP taking these medications    metoprolol succinate 50 MG 24 hr tablet  Commonly known as: TOPROL-XL            Indwelling Lines/Drains at time of discharge:   Lines/Drains/Airways     None                 Time spent on the discharge of patient: 40 minutes         Elliott Noe NP  Department of Hospital Medicine  [facility]

## 2023-10-26 NOTE — HOSPITAL COURSE
Mr Vergara is a 53 year old male who was transferred to [facility] from [facility] for evaluation of CKDstage 5. Nephrology consulted for further evaluation. No indication for RRT at this time. He was started on Phos binder. He will be continued on Lokelma 10 mg daily. He mostly will need dialysis very soon, his GFR is very low and may need RRT any moment safely would be to place access and start dialysis but patient wish to hold off on dialysis as of now. He was notified of increase risk of not having dialysis by Nephrology. Patient and wife advised to closely follow up with PCP and Nephrologist as outpatient. He was seen, examined and deemed stable for discharge.

## 2023-10-26 NOTE — PLAN OF CARE
Pt was agreeable to discharge to home. During hospital stay pt did not want to start dialysis at this hospital and was explained the risk by Nephrology. Pt plans to follow up with Nephrologist Dr. Desouza at [facility]. Transported home by spouse Jana Marcelo [phone]. I called Dr Desouza office and spoke with Cate, was able to get an appointment for Monday October 30th at 11:45AM with Labs and urine ordered for tomorrow. Contacted Pt and wife Jana Marcelo [phone] and informed them of Nephrologist appt for Ocober 30th at 11:45AM and blood work and urine scheduled at any [facility] facility for tomorrow October 27th. Pt and wife both verbalized understanding of Nephrology appt and Labs. Stated they will go tomorrow to do labs and will go to Nephrologist appt on Oct 30th.    Future Appointments   Date Time Provider Department Center   10/26/2023  3:00 PM ROBERT Marino MD [facility]   10/30/2023                11:45 AM                  Dr Desouza               Nephrology       10/26/23 0814   Final Note   Assessment Type Final Discharge Note   Anticipated Discharge Disposition Home   What phone number can be called within the next 1-3 days to see how you are doing after discharge? [phone]   Hospital Resources/Appts/Education Provided Provided patient/caregiver with written discharge plan information;Appointments scheduled and added to AVS   Post-Acute Status   Discharge Delays None known at this time

## 2023-10-26 NOTE — PROGRESS NOTES
HPI    Patient here today for f/u PDR ou. C/o blurry VA ou, floatwers in VA ou,   no flashes ou and denies pain.    AT ou prn  Last edited by Lynne Peralta on 10/26/2023  2:44 PM.            OCT - Complex DME OU  OD>OS    Prior WFFA - NV OU with late macular leakage OU        A/P    PDR OU  T2 uncontrolled without insulin  10/23 not seen x 8 months  Worsening VH OU    Will need PRP OU possible PPV OU if NI in heme    2. Complex DME OU  S/p Avastin OU x 3    Avastin OU today      3. HTN Ret OU  BS/BP/chol control    4. Early NS OU      1 month OCT and dilate      Risks, benefits, and alternatives to treatment discussed in detail with the patient.  The patient voiced understanding and wished to proceed with the procedure    Injection Procedure Note:  Diagnosis: DME OU    Patient Identified and Time Out complete  Pt marked  Topical Proparacaine and Betadine.  Inject Avastin OU at 6:00 @ 3.5-4mm posterior to limbus  Post Operative Dx: Same  Complications: None  Follow up as above.

## 2023-10-27 LAB
H CAPSUL AG UR-MCNC: NOT DETECTED NG/ML
HISTOPLASMA ANTIGEN URINE: NOT DETECTED

## 2023-10-27 NOTE — PHYSICIAN QUERY
PT Name: Wai Bain  MR #: [id number]  DOCUMENTATION CLARIFICATION     CDS/: David Pickard               Contact information:[email or web address]  This form is a permanent document in the medical record.     Query Date: October 26, 2023    By submitting this query, we are merely seeking further clarification of documentation. Please utilize your independent clinical judgment when addressing the question(s) below.    The Medical Record contains the following:   Indicators Supporting Clinical Findings Location in Medical Record   x CKD or Chronic Kidney (Renal) Failure/Disease -likely ESRD Progress Notes by Mame Booker MD at 10/22/2023 12:34 PM     x BUN/Creatinine                          GFR Gfr- 5 Result review on 10/21    Dehydration      Nausea / Vomiting     x Dialysis / CRRT mostly will need dialysis very soon, his GFR is very low and may need RRT any moment safely would be to place access and start dialysis but patient wish to hold off on dialysis as of now. He was notified of increase risk of not having dialysis by Nephrology. Discharge summary   x Medication He will be continued on Lokelma 10 mg daily Discharge summary    Treatment      Other Chronic Conditions      Other       Provider, please further specify the stage of Chronic Kidney Disease (CKD):    [   ] Chronic Kidney Disease (CKD) stage 1 - Normal or high eGFR 90+   [   ] Chronic Kidney Disease (CKD) stage 2 - Mildly decreased eGFR 60-89   [   ] Chronic Kidney Disease (CKD) stage 3a - Mildly to moderately decreased eGFR 45-59   [   ] Chronic Kidney Disease (CKD) stage 3b - Moderately to severely decreased eGFR 30-44   [   ] Chronic Kidney Disease (CKD) stage 3 unspecified   [   ] Chronic Kidney Disease (CKD) stage 4 - Severely decreased eGFR 15-29   [ x  ] Chronic Kidney Disease (CKD) stage 5 - Kidney failure eGFR <15   [   ] Chronic Kidney Disease (CKD) stage 5 on chronic dialysis   [   ] End Stage Renal  Disease (ESRD) - Kidney failure, requiring renal replacement therapy or transplant, eGFR <15 (for 3 or more months)   [   ] Other (please specify): _________________   [   ] Clinically Undetermined     Please document in your progress notes daily for the duration of treatment until resolved and include in your discharge summary.    Reference:     DANNA Hickman MD, & ROSY Nelson MD, MS. (2020, June 18). Definition and staging of chronic kidney disease in adults ([id number] CRISPIN Chowdhury MD, ScD & [id number] MYRON Christie MD, MSc, Eds.). Retrieved October 21, 2020, from https://www.[employer]/contents/definition-and-staging-of-chronic-kidney-disease-in-adults?search=ckd%20staging&source=search_result&selectedTitle=1~150&usage_type=default&display_rank=1    Form No. 86968

## 2023-10-28 LAB
H CAPSUL AB SER QL ID: NEGATIVE
H CAPSUL MYC AB SER QL CF: NEGATIVE
H CAPSUL YST AB SER QL CF: NEGATIVE

## 2023-10-30 PROBLEM — D50.9 IRON DEFICIENCY ANEMIA, UNSPECIFIED: Status: ACTIVE | Noted: 2023-10-30

## 2023-10-30 PROBLEM — N18.5 ANEMIA DUE TO STAGE 5 CHRONIC KIDNEY DISEASE, NOT ON CHRONIC DIALYSIS: Status: ACTIVE | Noted: 2023-10-21

## 2023-10-31 ENCOUNTER — TELEPHONE (OUTPATIENT)
Dept: TRANSPLANT | Facility: CLINIC | Age: 53
End: 2023-10-31
Payer: MEDICAID

## 2023-11-06 ENCOUNTER — TELEPHONE (OUTPATIENT)
Dept: TRANSPLANT | Facility: CLINIC | Age: 53
End: 2023-11-06
Payer: MEDICAID

## 2023-11-08 ENCOUNTER — TELEPHONE (OUTPATIENT)
Dept: TRANSPLANT | Facility: CLINIC | Age: 53
End: 2023-11-08
Payer: MEDICAID

## 2023-11-08 DIAGNOSIS — Z91.89 CARDIOVASCULAR EVENT RISK: ICD-10-CM

## 2023-11-08 DIAGNOSIS — Z76.82 ORGAN TRANSPLANT CANDIDATE: Primary | ICD-10-CM

## 2023-11-09 ENCOUNTER — HOSPITAL ENCOUNTER (INPATIENT)
Facility: HOSPITAL | Age: 53
LOS: 6 days | Discharge: HOME | DRG: 673 | End: 2023-11-15
Attending: EMERGENCY MEDICINE | Admitting: EMERGENCY MEDICINE
Payer: MEDICAID

## 2023-11-09 DIAGNOSIS — R07.9 CHEST PAIN: ICD-10-CM

## 2023-11-09 DIAGNOSIS — R79.89 ELEVATED BRAIN NATRIURETIC PEPTIDE (BNP) LEVEL: ICD-10-CM

## 2023-11-09 DIAGNOSIS — R06.02 SHORTNESS OF BREATH: ICD-10-CM

## 2023-11-09 DIAGNOSIS — N18.5 CKD (CHRONIC KIDNEY DISEASE), STAGE V: Primary | ICD-10-CM

## 2023-11-09 DIAGNOSIS — E87.70 FLUID OVERLOAD: ICD-10-CM

## 2023-11-09 PROBLEM — E83.39 HYPERPHOSPHATEMIA: Status: ACTIVE | Noted: 2023-11-09

## 2023-11-09 LAB
ALBUMIN SERPL BCP-MCNC: 3.3 G/DL (ref 3.5–5.2)
ALLENS TEST: ABNORMAL
ALP SERPL-CCNC: 64 U/L (ref 55–135)
ALT SERPL W/O P-5'-P-CCNC: 27 U/L (ref 10–44)
ANION GAP SERPL CALC-SCNC: 15 MMOL/L (ref 8–16)
AST SERPL-CCNC: 23 U/L (ref 10–40)
BASOPHILS # BLD AUTO: 0.02 K/UL (ref 0–0.2)
BASOPHILS NFR BLD: 0.3 % (ref 0–1.9)
BILIRUB SERPL-MCNC: 0.4 MG/DL (ref 0.1–1)
BNP SERPL-MCNC: 530 PG/ML (ref 0–99)
BUN SERPL-MCNC: 91 MG/DL (ref 6–20)
CALCIUM SERPL-MCNC: 8.3 MG/DL (ref 8.7–10.5)
CHLORIDE SERPL-SCNC: 103 MMOL/L (ref 95–110)
CO2 SERPL-SCNC: 25 MMOL/L (ref 23–29)
CREAT SERPL-MCNC: 11.3 MG/DL (ref 0.5–1.4)
DIFFERENTIAL METHOD: ABNORMAL
EOSINOPHIL # BLD AUTO: 0.4 K/UL (ref 0–0.5)
EOSINOPHIL NFR BLD: 5.4 % (ref 0–8)
ERYTHROCYTE [DISTWIDTH] IN BLOOD BY AUTOMATED COUNT: 12.1 % (ref 11.5–14.5)
EST. GFR  (NO RACE VARIABLE): 4.9 ML/MIN/1.73 M^2
GLUCOSE SERPL-MCNC: 99 MG/DL (ref 70–110)
HCO3 UR-SCNC: 27.4 MMOL/L (ref 24–28)
HCT VFR BLD AUTO: 22.4 % (ref 40–54)
HGB BLD-MCNC: 7.3 G/DL (ref 14–18)
IMM GRANULOCYTES # BLD AUTO: 0.02 K/UL (ref 0–0.04)
IMM GRANULOCYTES NFR BLD AUTO: 0.3 % (ref 0–0.5)
LYMPHOCYTES # BLD AUTO: 0.7 K/UL (ref 1–4.8)
LYMPHOCYTES NFR BLD: 9.6 % (ref 18–48)
MAGNESIUM SERPL-MCNC: 2.2 MG/DL (ref 1.6–2.6)
MCH RBC QN AUTO: 27.5 PG (ref 27–31)
MCHC RBC AUTO-ENTMCNC: 32.6 G/DL (ref 32–36)
MCV RBC AUTO: 85 FL (ref 82–98)
MONOCYTES # BLD AUTO: 0.9 K/UL (ref 0.3–1)
MONOCYTES NFR BLD: 12 % (ref 4–15)
NEUTROPHILS # BLD AUTO: 5.5 K/UL (ref 1.8–7.7)
NEUTROPHILS NFR BLD: 72.4 % (ref 38–73)
NRBC BLD-RTO: 0 /100 WBC
PCO2 BLDA: 37.7 MMHG (ref 35–45)
PH SMN: 7.47 [PH] (ref 7.35–7.45)
PHOSPHATE SERPL-MCNC: 5.7 MG/DL (ref 2.7–4.5)
PLATELET # BLD AUTO: 174 K/UL (ref 150–450)
PMV BLD AUTO: 9.9 FL (ref 9.2–12.9)
PO2 BLDA: 81 MMHG (ref 40–60)
POC BE: 4 MMOL/L
POC SATURATED O2: 97 % (ref 95–100)
POC TCO2: 28 MMOL/L (ref 24–29)
POTASSIUM SERPL-SCNC: 4.5 MMOL/L (ref 3.5–5.1)
PROT SERPL-MCNC: 6.6 G/DL (ref 6–8.4)
RBC # BLD AUTO: 2.65 M/UL (ref 4.6–6.2)
SAMPLE: ABNORMAL
SARS-COV-2 RDRP RESP QL NAA+PROBE: NEGATIVE
SITE: ABNORMAL
SODIUM SERPL-SCNC: 143 MMOL/L (ref 136–145)
TROPONIN I SERPL DL<=0.01 NG/ML-MCNC: 0.08 NG/ML (ref 0–0.03)
TROPONIN I SERPL DL<=0.01 NG/ML-MCNC: 0.09 NG/ML (ref 0–0.03)
WBC # BLD AUTO: 7.57 K/UL (ref 3.9–12.7)

## 2023-11-09 PROCEDURE — 93005 ELECTROCARDIOGRAM TRACING: CPT | Mod: NTX

## 2023-11-09 PROCEDURE — 99285 EMERGENCY DEPT VISIT HI MDM: CPT | Mod: 25,NTX

## 2023-11-09 PROCEDURE — 80053 COMPREHEN METABOLIC PANEL: CPT | Mod: NTX

## 2023-11-09 PROCEDURE — 63600175 PHARM REV CODE 636 W HCPCS: Mod: NTX

## 2023-11-09 PROCEDURE — G0378 HOSPITAL OBSERVATION PER HR: HCPCS | Mod: NTX

## 2023-11-09 PROCEDURE — 85025 COMPLETE CBC W/AUTO DIFF WBC: CPT | Mod: NTX

## 2023-11-09 PROCEDURE — 84484 ASSAY OF TROPONIN QUANT: CPT | Mod: 91,NTX

## 2023-11-09 PROCEDURE — 82803 BLOOD GASES ANY COMBINATION: CPT | Mod: NTX

## 2023-11-09 PROCEDURE — 93010 EKG 12-LEAD: ICD-10-PCS | Mod: NTX,,, | Performed by: INTERNAL MEDICINE

## 2023-11-09 PROCEDURE — 83735 ASSAY OF MAGNESIUM: CPT | Mod: NTX

## 2023-11-09 PROCEDURE — 20600001 HC STEP DOWN PRIVATE ROOM: Mod: NTX

## 2023-11-09 PROCEDURE — U0002 COVID-19 LAB TEST NON-CDC: HCPCS | Mod: NTX | Performed by: HOSPITALIST

## 2023-11-09 PROCEDURE — 25000003 PHARM REV CODE 250: Mod: NTX | Performed by: NURSE PRACTITIONER

## 2023-11-09 PROCEDURE — 93010 ELECTROCARDIOGRAM REPORT: CPT | Mod: 59,NTX,, | Performed by: INTERNAL MEDICINE

## 2023-11-09 PROCEDURE — 99900035 HC TECH TIME PER 15 MIN (STAT): Mod: NTX

## 2023-11-09 PROCEDURE — 84484 ASSAY OF TROPONIN QUANT: CPT | Mod: NTX | Performed by: NURSE PRACTITIONER

## 2023-11-09 PROCEDURE — 96374 THER/PROPH/DIAG INJ IV PUSH: CPT | Mod: NTX

## 2023-11-09 PROCEDURE — 84100 ASSAY OF PHOSPHORUS: CPT | Mod: NTX

## 2023-11-09 PROCEDURE — 83880 ASSAY OF NATRIURETIC PEPTIDE: CPT | Mod: NTX

## 2023-11-09 PROCEDURE — 93010 ELECTROCARDIOGRAM REPORT: CPT | Mod: NTX,,, | Performed by: INTERNAL MEDICINE

## 2023-11-09 RX ORDER — NALOXONE HCL 0.4 MG/ML
0.02 VIAL (ML) INJECTION
Status: DISCONTINUED | OUTPATIENT
Start: 2023-11-09 | End: 2023-11-15 | Stop reason: HOSPADM

## 2023-11-09 RX ORDER — SODIUM CHLORIDE 0.9 % (FLUSH) 0.9 %
10 SYRINGE (ML) INJECTION EVERY 12 HOURS PRN
Status: DISCONTINUED | OUTPATIENT
Start: 2023-11-09 | End: 2023-11-15 | Stop reason: HOSPADM

## 2023-11-09 RX ORDER — AMLODIPINE BESYLATE 10 MG/1
10 TABLET ORAL DAILY
Status: DISCONTINUED | OUTPATIENT
Start: 2023-11-10 | End: 2023-11-15 | Stop reason: HOSPADM

## 2023-11-09 RX ORDER — GLUCAGON 1 MG
1 KIT INJECTION
Status: DISCONTINUED | OUTPATIENT
Start: 2023-11-09 | End: 2023-11-15 | Stop reason: HOSPADM

## 2023-11-09 RX ORDER — IBUPROFEN 200 MG
16 TABLET ORAL
Status: DISCONTINUED | OUTPATIENT
Start: 2023-11-09 | End: 2023-11-15 | Stop reason: HOSPADM

## 2023-11-09 RX ORDER — ACETAMINOPHEN 325 MG/1
650 TABLET ORAL EVERY 6 HOURS PRN
Status: DISCONTINUED | OUTPATIENT
Start: 2023-11-09 | End: 2023-11-15 | Stop reason: HOSPADM

## 2023-11-09 RX ORDER — CARVEDILOL 12.5 MG/1
25 TABLET ORAL 2 TIMES DAILY
Status: DISCONTINUED | OUTPATIENT
Start: 2023-11-09 | End: 2023-11-15 | Stop reason: HOSPADM

## 2023-11-09 RX ORDER — HYDRALAZINE HYDROCHLORIDE 25 MG/1
50 TABLET, FILM COATED ORAL EVERY 8 HOURS
Status: DISCONTINUED | OUTPATIENT
Start: 2023-11-09 | End: 2023-11-11

## 2023-11-09 RX ORDER — HEPARIN SODIUM 5000 [USP'U]/ML
5000 INJECTION, SOLUTION INTRAVENOUS; SUBCUTANEOUS EVERY 8 HOURS
Status: DISCONTINUED | OUTPATIENT
Start: 2023-11-09 | End: 2023-11-12

## 2023-11-09 RX ORDER — ACETAMINOPHEN 500 MG
1000 TABLET ORAL EVERY 8 HOURS PRN
Status: DISCONTINUED | OUTPATIENT
Start: 2023-11-09 | End: 2023-11-15 | Stop reason: HOSPADM

## 2023-11-09 RX ORDER — TALC
6 POWDER (GRAM) TOPICAL NIGHTLY PRN
Status: DISCONTINUED | OUTPATIENT
Start: 2023-11-09 | End: 2023-11-15 | Stop reason: HOSPADM

## 2023-11-09 RX ORDER — CALCIUM ACETATE 667 MG/1
1334 CAPSULE ORAL
Status: DISCONTINUED | OUTPATIENT
Start: 2023-11-10 | End: 2023-11-15 | Stop reason: HOSPADM

## 2023-11-09 RX ORDER — SODIUM BICARBONATE 650 MG/1
1300 TABLET ORAL 3 TIMES DAILY
Status: DISCONTINUED | OUTPATIENT
Start: 2023-11-09 | End: 2023-11-15 | Stop reason: HOSPADM

## 2023-11-09 RX ORDER — FUROSEMIDE 10 MG/ML
80 INJECTION INTRAMUSCULAR; INTRAVENOUS
Status: COMPLETED | OUTPATIENT
Start: 2023-11-09 | End: 2023-11-09

## 2023-11-09 RX ORDER — IBUPROFEN 200 MG
24 TABLET ORAL
Status: DISCONTINUED | OUTPATIENT
Start: 2023-11-09 | End: 2023-11-15 | Stop reason: HOSPADM

## 2023-11-09 RX ORDER — ONDANSETRON 2 MG/ML
4 INJECTION INTRAMUSCULAR; INTRAVENOUS EVERY 8 HOURS PRN
Status: DISCONTINUED | OUTPATIENT
Start: 2023-11-09 | End: 2023-11-15 | Stop reason: HOSPADM

## 2023-11-09 RX ORDER — FUROSEMIDE 10 MG/ML
80 INJECTION INTRAMUSCULAR; INTRAVENOUS ONCE
Status: COMPLETED | OUTPATIENT
Start: 2023-11-10 | End: 2023-11-10

## 2023-11-09 RX ADMIN — HYDRALAZINE HYDROCHLORIDE 50 MG: 25 TABLET, FILM COATED ORAL at 10:11

## 2023-11-09 RX ADMIN — SODIUM BICARBONATE 650 MG TABLET 1300 MG: at 08:11

## 2023-11-09 RX ADMIN — CARVEDILOL 25 MG: 12.5 TABLET, FILM COATED ORAL at 08:11

## 2023-11-09 RX ADMIN — FUROSEMIDE 80 MG: 10 INJECTION, SOLUTION INTRAVENOUS at 07:11

## 2023-11-09 NOTE — Clinical Note
The neck was prepped. The site was prepped with ChloraPrep. The site was clipped. The patient was draped.

## 2023-11-09 NOTE — ED NOTES
Wai Bain, a 53 y.o. male presents to the ED w/ complaint of shortness of breath.  Per wife patient is kidney patient and being followed by nephrology with scheduled dialysis placement tomorrow.  She says while at home he started feeling bad and it progressed to worse do they came to ED.  Patient Upper sorbian speaking  bedside.    Triage note:  Chief Complaint   Patient presents with    Shortness of Breath     Dialysis patient/ complains of edema     Review of patient's allergies indicates:  No Known Allergies  Past Medical History:   Diagnosis Date    CKD (chronic kidney disease), stage V 10/21/2023    Diabetes mellitus     Hypertension     Urinary tract infection    Patient identifiers for Wai Bain checked and correct.    LOC: The patient is awake, alert and aware of environment with an appropriate affect, the patient is oriented x 4 and speaking appropriately.    APPEARANCE: Patient appears nervous and uncomfortable.    SKIN: The skin is warm and dry, color consistent with ethnicity, patient has normal skin turgor and moist mucus membranes, skin intact, no breakdown or bruising noted.     MUSCULOSKELETAL: Patient with difficulty and slowly due to BLE and BUE and general body edema, no obvious swelling or deformities noted.    RESPIRATORY: Airway is open and patent, respirations are spontaneous and even, patient has a normal effort and rate. Patient is complaining of SOB    CARDIAC: Patient has a normal rate, see EKG.    ABDOMEN: Abdomen distended and tight. Patient denies any nausea, vomiting, diarrhea, or constipation.     NEUROLOGIC: PERRLA    HEENT: No abnormalities noted. White sclera and pupils equal round and reactive to light. Denies headache, dizziness.     : Pt voids independently, denies dysuria, hematuria, frequency.

## 2023-11-10 LAB
ABO + RH BLD: NORMAL
ALBUMIN SERPL BCP-MCNC: 3 G/DL (ref 3.5–5.2)
ALP SERPL-CCNC: 55 U/L (ref 55–135)
ALT SERPL W/O P-5'-P-CCNC: 21 U/L (ref 10–44)
ANION GAP SERPL CALC-SCNC: 13 MMOL/L (ref 8–16)
ASCENDING AORTA: 3.15 CM
AST SERPL-CCNC: 15 U/L (ref 10–40)
AV INDEX (PROSTH): 0.57
AV MEAN GRADIENT: 11 MMHG
AV PEAK GRADIENT: 20 MMHG
AV VALVE AREA BY VELOCITY RATIO: 2.95 CM²
AV VALVE AREA: 3.27 CM²
AV VELOCITY RATIO: 0.52
BASOPHILS # BLD AUTO: 0.02 K/UL (ref 0–0.2)
BASOPHILS NFR BLD: 0.3 % (ref 0–1.9)
BILIRUB SERPL-MCNC: 0.3 MG/DL (ref 0.1–1)
BLD GP AB SCN CELLS X3 SERPL QL: NORMAL
BSA FOR ECHO PROCEDURE: 2.32 M2
BUN SERPL-MCNC: 92 MG/DL (ref 6–20)
CALCIUM SERPL-MCNC: 8.2 MG/DL (ref 8.7–10.5)
CHLORIDE SERPL-SCNC: 103 MMOL/L (ref 95–110)
CO2 SERPL-SCNC: 26 MMOL/L (ref 23–29)
CREAT SERPL-MCNC: 11.5 MG/DL (ref 0.5–1.4)
CV ECHO LV RWT: 0.4 CM
DIFFERENTIAL METHOD: ABNORMAL
DOP CALC AO PEAK VEL: 2.23 M/S
DOP CALC AO VTI: 54.8 CM
DOP CALC LVOT AREA: 5.7 CM2
DOP CALC LVOT DIAMETER: 2.7 CM
DOP CALC LVOT PEAK VEL: 1.15 M/S
DOP CALC LVOT STROKE VOLUME: 179.35 CM3
DOP CALCLVOT PEAK VEL VTI: 31.34 CM
E WAVE DECELERATION TIME: 189.3 MSEC
E/A RATIO: 1.32
E/E' RATIO: 14.6 M/S
ECHO LV POSTERIOR WALL: 1.13 CM (ref 0.6–1.1)
EOSINOPHIL # BLD AUTO: 0.4 K/UL (ref 0–0.5)
EOSINOPHIL NFR BLD: 5.6 % (ref 0–8)
ERYTHROCYTE [DISTWIDTH] IN BLOOD BY AUTOMATED COUNT: 12.2 % (ref 11.5–14.5)
EST. GFR  (NO RACE VARIABLE): 4.8 ML/MIN/1.73 M^2
FRACTIONAL SHORTENING: 37 % (ref 28–44)
GLUCOSE SERPL-MCNC: 111 MG/DL (ref 70–110)
HCT VFR BLD AUTO: 20.9 % (ref 40–54)
HGB BLD-MCNC: 6.7 G/DL (ref 14–18)
HGB BLD-MCNC: 7.4 G/DL (ref 14–18)
IMM GRANULOCYTES # BLD AUTO: 0.03 K/UL (ref 0–0.04)
IMM GRANULOCYTES NFR BLD AUTO: 0.4 % (ref 0–0.5)
INR PPP: 1.2 (ref 0.8–1.2)
INTERVENTRICULAR SEPTUM: 0.93 CM (ref 0.6–1.1)
IVRT: 74.22 MSEC
LA MAJOR: 6.71 CM
LA MINOR: 6.44 CM
LA WIDTH: 5.33 CM
LEFT ATRIUM SIZE: 4.87 CM
LEFT ATRIUM VOLUME INDEX MOD: 53 ML/M2
LEFT ATRIUM VOLUME INDEX: 65.3 ML/M2
LEFT ATRIUM VOLUME MOD: 117.65 CM3
LEFT ATRIUM VOLUME: 145.01 CM3
LEFT INTERNAL DIMENSION IN SYSTOLE: 3.52 CM (ref 2.1–4)
LEFT VENTRICLE DIASTOLIC VOLUME INDEX: 70.04 ML/M2
LEFT VENTRICLE DIASTOLIC VOLUME: 155.49 ML
LEFT VENTRICLE MASS INDEX: 104 G/M2
LEFT VENTRICLE SYSTOLIC VOLUME INDEX: 23.3 ML/M2
LEFT VENTRICLE SYSTOLIC VOLUME: 51.7 ML
LEFT VENTRICULAR INTERNAL DIMENSION IN DIASTOLE: 5.63 CM (ref 3.5–6)
LEFT VENTRICULAR MASS: 230.48 G
LV LATERAL E/E' RATIO: 13.27 M/S
LV SEPTAL E/E' RATIO: 16.22 M/S
LYMPHOCYTES # BLD AUTO: 0.8 K/UL (ref 1–4.8)
LYMPHOCYTES NFR BLD: 11.4 % (ref 18–48)
MAGNESIUM SERPL-MCNC: 2.3 MG/DL (ref 1.6–2.6)
MCH RBC QN AUTO: 27 PG (ref 27–31)
MCHC RBC AUTO-ENTMCNC: 32.1 G/DL (ref 32–36)
MCV RBC AUTO: 84 FL (ref 82–98)
MONOCYTES # BLD AUTO: 0.9 K/UL (ref 0.3–1)
MONOCYTES NFR BLD: 12 % (ref 4–15)
MV A" WAVE DURATION": 12.56 MSEC
MV PEAK A VEL: 1.11 M/S
MV PEAK E VEL: 1.46 M/S
MV STENOSIS PRESSURE HALF TIME: 54.9 MS
MV VALVE AREA P 1/2 METHOD: 4.01 CM2
NEUTROPHILS # BLD AUTO: 5 K/UL (ref 1.8–7.7)
NEUTROPHILS NFR BLD: 70.3 % (ref 38–73)
NRBC BLD-RTO: 0 /100 WBC
PHOSPHATE SERPL-MCNC: 6.2 MG/DL (ref 2.7–4.5)
PISA MRMAX VEL: 0.06 M/S
PISA TR MAX VEL: 3.11 M/S
PLATELET # BLD AUTO: 147 K/UL (ref 150–450)
PMV BLD AUTO: 10 FL (ref 9.2–12.9)
POTASSIUM SERPL-SCNC: 4.1 MMOL/L (ref 3.5–5.1)
PROT SERPL-MCNC: 5.9 G/DL (ref 6–8.4)
PROTHROMBIN TIME: 12.3 SEC (ref 9–12.5)
PULM VEIN S/D RATIO: 0.82
PV PEAK D VEL: 0.57 M/S
PV PEAK S VEL: 0.47 M/S
RA MAJOR: 5.64 CM
RA PRESSURE ESTIMATED: 8 MMHG
RA WIDTH: 4.19 CM
RBC # BLD AUTO: 2.48 M/UL (ref 4.6–6.2)
RIGHT VENTRICULAR END-DIASTOLIC DIMENSION: 4.64 CM
RV TB RVSP: 11 MMHG
SINUS: 3.17 CM
SODIUM SERPL-SCNC: 142 MMOL/L (ref 136–145)
SPECIMEN OUTDATE: NORMAL
STJ: 2.76 CM
TDI LATERAL: 0.11 M/S
TDI SEPTAL: 0.09 M/S
TDI: 0.1 M/S
TR MAX PG: 39 MMHG
TRICUSPID ANNULAR PLANE SYSTOLIC EXCURSION: 2.36 CM
TV REST PULMONARY ARTERY PRESSURE: 47 MMHG
WBC # BLD AUTO: 7.17 K/UL (ref 3.9–12.7)
Z-SCORE OF LEFT VENTRICULAR DIMENSION IN END DIASTOLE: -3.2
Z-SCORE OF LEFT VENTRICULAR DIMENSION IN END SYSTOLE: -2.29

## 2023-11-10 PROCEDURE — 99214 OFFICE O/P EST MOD 30 MIN: CPT | Mod: NTX,,, | Performed by: INTERNAL MEDICINE

## 2023-11-10 PROCEDURE — 85610 PROTHROMBIN TIME: CPT | Mod: NTX | Performed by: NURSE PRACTITIONER

## 2023-11-10 PROCEDURE — 96372 THER/PROPH/DIAG INJ SC/IM: CPT | Performed by: NURSE PRACTITIONER

## 2023-11-10 PROCEDURE — 36415 COLL VENOUS BLD VENIPUNCTURE: CPT | Mod: NTX | Performed by: NURSE PRACTITIONER

## 2023-11-10 PROCEDURE — 20600001 HC STEP DOWN PRIVATE ROOM: Mod: NTX

## 2023-11-10 PROCEDURE — 86850 RBC ANTIBODY SCREEN: CPT | Mod: NTX | Performed by: STUDENT IN AN ORGANIZED HEALTH CARE EDUCATION/TRAINING PROGRAM

## 2023-11-10 PROCEDURE — 83735 ASSAY OF MAGNESIUM: CPT | Mod: NTX | Performed by: NURSE PRACTITIONER

## 2023-11-10 PROCEDURE — 63600175 PHARM REV CODE 636 W HCPCS: Mod: NTX | Performed by: NURSE PRACTITIONER

## 2023-11-10 PROCEDURE — 85018 HEMOGLOBIN: CPT | Mod: NTX | Performed by: STUDENT IN AN ORGANIZED HEALTH CARE EDUCATION/TRAINING PROGRAM

## 2023-11-10 PROCEDURE — 99214 PR OFFICE/OUTPT VISIT, EST, LEVL IV, 30-39 MIN: ICD-10-PCS | Mod: NTX,,, | Performed by: INTERNAL MEDICINE

## 2023-11-10 PROCEDURE — 80053 COMPREHEN METABOLIC PANEL: CPT | Mod: NTX | Performed by: NURSE PRACTITIONER

## 2023-11-10 PROCEDURE — G0378 HOSPITAL OBSERVATION PER HR: HCPCS | Mod: NTX

## 2023-11-10 PROCEDURE — 36415 COLL VENOUS BLD VENIPUNCTURE: CPT | Mod: NTX | Performed by: STUDENT IN AN ORGANIZED HEALTH CARE EDUCATION/TRAINING PROGRAM

## 2023-11-10 PROCEDURE — 85025 COMPLETE CBC W/AUTO DIFF WBC: CPT | Mod: NTX | Performed by: NURSE PRACTITIONER

## 2023-11-10 PROCEDURE — 25000003 PHARM REV CODE 250: Mod: NTX | Performed by: NURSE PRACTITIONER

## 2023-11-10 PROCEDURE — 84100 ASSAY OF PHOSPHORUS: CPT | Mod: NTX | Performed by: NURSE PRACTITIONER

## 2023-11-10 RX ORDER — SODIUM CHLORIDE 0.9 % (FLUSH) 0.9 %
10 SYRINGE (ML) INJECTION
Status: DISCONTINUED | OUTPATIENT
Start: 2023-11-10 | End: 2023-11-15 | Stop reason: HOSPADM

## 2023-11-10 RX ORDER — FUROSEMIDE 10 MG/ML
80 INJECTION INTRAMUSCULAR; INTRAVENOUS DAILY
Status: DISCONTINUED | OUTPATIENT
Start: 2023-11-11 | End: 2023-11-15 | Stop reason: HOSPADM

## 2023-11-10 RX ORDER — SODIUM CHLORIDE 9 MG/ML
INJECTION, SOLUTION INTRAVENOUS CONTINUOUS
Status: CANCELLED | OUTPATIENT
Start: 2023-11-10 | End: 2023-11-10

## 2023-11-10 RX ADMIN — HEPARIN SODIUM 5000 UNITS: 5000 INJECTION INTRAVENOUS; SUBCUTANEOUS at 02:11

## 2023-11-10 RX ADMIN — CARVEDILOL 25 MG: 12.5 TABLET, FILM COATED ORAL at 09:11

## 2023-11-10 RX ADMIN — FUROSEMIDE 80 MG: 10 INJECTION, SOLUTION INTRAMUSCULAR; INTRAVENOUS at 05:11

## 2023-11-10 RX ADMIN — HYDRALAZINE HYDROCHLORIDE 50 MG: 25 TABLET, FILM COATED ORAL at 02:11

## 2023-11-10 RX ADMIN — CARVEDILOL 25 MG: 12.5 TABLET, FILM COATED ORAL at 10:11

## 2023-11-10 RX ADMIN — HYDRALAZINE HYDROCHLORIDE 50 MG: 25 TABLET, FILM COATED ORAL at 05:11

## 2023-11-10 RX ADMIN — CALCIUM ACETATE 1334 MG: 667 CAPSULE ORAL at 05:11

## 2023-11-10 RX ADMIN — SODIUM BICARBONATE 650 MG TABLET 1300 MG: at 09:11

## 2023-11-10 RX ADMIN — SODIUM ZIRCONIUM CYCLOSILICATE 10 G: 5 POWDER, FOR SUSPENSION ORAL at 02:11

## 2023-11-10 RX ADMIN — HEPARIN SODIUM 5000 UNITS: 5000 INJECTION INTRAVENOUS; SUBCUTANEOUS at 09:11

## 2023-11-10 RX ADMIN — HYDRALAZINE HYDROCHLORIDE 50 MG: 25 TABLET, FILM COATED ORAL at 09:11

## 2023-11-10 RX ADMIN — SODIUM BICARBONATE 650 MG TABLET 1300 MG: at 10:11

## 2023-11-10 RX ADMIN — AMLODIPINE BESYLATE 10 MG: 10 TABLET ORAL at 10:11

## 2023-11-10 RX ADMIN — CALCIUM ACETATE 1334 MG: 667 CAPSULE ORAL at 02:11

## 2023-11-10 RX ADMIN — SODIUM BICARBONATE 650 MG TABLET 1300 MG: at 02:11

## 2023-11-10 NOTE — HPI
"Wai Bain is a 53 y.o. male with a PMHx of DM2, HTN, CKD5, and anemia who presents to the ED with complaints of shortness of breath. Per pt and his wife he is followed by nephrology outpatient. He has an appointment tomorrow for HD tunneled line placement. His wife reports he became acutely short of breath today noting he has had intermittent CAT for several months. Pt endorses associated BLE edema, fatigue, abdominal distention, non productive cough, 15lb weight gain x1 month, nausea, and decreased UO. He notes intermittent lightheadedness/ head "fullness" with exertion. The patient's wife reports he was hospitalized about 3 weeks ago at [facility] for anemia and elevated blood pressure. She states his blood pressure have been much better controlled since the addition of several antihypertensives. The patient denies fever, chills, CP, abdominal pain, vomiting, diarrhea, or dysuria.    In the ED, VSS, afebrile. CBC with stable anemia. Cr 11.3 (consistent with CKD5). Calcium 8.3. Albumin 3.3. . Troponin 0.093. EKG with SR, 67 bpm, no ST elevation or depression. CXR reveals pulmonary edema. The patient received 80mg IV lasix.  "

## 2023-11-10 NOTE — CONSULTS
"Chief Complaint   Patient presents with    Shortness of Breath     Dialysis patient/ complains of edema        HPI:  I have seen Mr. Bain for a cuffed tunneled HD catheter insertion. HE is known to have HTN, DM, CLD V presented with volume overload. I have explained the procedure to him with all the complications.  Including bleeding, pain and infection. He is scheduled for Monday morning. Please send PT and PTT on Sunday morning. Hold al anticoagulation on Sunday evening. Keep him NPO after midnight on Sunday.    Past Medical History:   Diagnosis Date    CKD (chronic kidney disease), stage V 10/21/2023    Diabetes mellitus     Diabetes mellitus, type 2     H/O esophagogastroduodenoscopy     Hypertension     Urinary tract infection     History       Past Surgical History:   Procedure Laterality Date    ESOPHAGOGASTRODUODENOSCOPY N/A 03/13/2020    Procedure: EGD (ESOPHAGOGASTRODUODENOSCOPY);  Surgeon: Katiuska العراقي MD;  Location: [facility] ENDO;  Service: Endoscopy;  Laterality: N/A;    LAPAROSCOPIC CHOLECYSTECTOMY N/A 06/22/2020    Procedure: CHOLECYSTECTOMY, LAPAROSCOPIC;  Surgeon: Dayron Conner MD;  Location: [facility] OR;  Service: General;  Laterality: N/A;       Family History   Problem Relation Age of Onset    Prostate cancer Neg Hx     Kidney disease Neg Hx        Social History     Socioeconomic History    Marital status:      Spouse name: Jana Marcelo   Tobacco Use    Smoking status: Former     Types: Cigarettes    Smokeless tobacco: Never    Tobacco comments:     quit "long time ago"   Substance and Sexual Activity    Alcohol use: Yes     Alcohol/week: 1.0 standard drink of alcohol     Types: 1 Cans of beer per week     Comment: social    Drug use: Never    Sexual activity: Yes     Partners: Female   Social History Narrative    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     Social Determinants of Health     Financial Resource Strain: Low Risk  (10/23/2023)    Overall Financial Resource Strain (CARDIA)     Difficulty of " Paying Living Expenses: Not hard at all   Food Insecurity: No Food Insecurity (10/23/2023)    Hunger Vital Sign     Worried About Running Out of Food in the Last Year: Never true     Ran Out of Food in the Last Year: Never true   Transportation Needs: No Transportation Needs (10/23/2023)    PRAPARE - Transportation     Lack of Transportation (Medical): No     Lack of Transportation (Non-Medical): No   Physical Activity: Sufficiently Active (10/23/2023)    Exercise Vital Sign     Days of Exercise per Week: 5 days     Minutes of Exercise per Session: 30 min   Stress: Stress Concern Present (10/23/2023)    [place] [employer] - Occupational Stress Questionnaire     Feeling of Stress : To some extent   Social Connections: Moderately Isolated (10/23/2023)    Social Connection and Isolation Panel [NHANES]     Frequency of Communication with Friends and Family: More than three times a week     Frequency of Social Gatherings with Friends and Family: More than three times a week     Attends Gnosticism Services: Never     Active Member of Clubs or Organizations: No     Attends Club or Organization Meetings: Never     Marital Status:    Housing Stability: Low Risk  (10/23/2023)    Housing Stability Vital Sign     Unable to Pay for Housing in the Last Year: No     Number of Places Lived in the Last Year: 1     Unstable Housing in the Last Year: No       Current Facility-Administered Medications   Medication Dose Route Frequency Provider Last Rate Last Admin    acetaminophen tablet 1,000 mg  1,000 mg Oral Q8H PRN Michelle Lainez, NP        acetaminophen tablet 650 mg  650 mg Oral Q6H PRN Michelle Lainez, NP        amLODIPine tablet 10 mg  10 mg Oral Daily Michelle Lainez, NP   10 mg at 11/10/23 1039    calcium acetate(phosphat bind) capsule 1,334 mg  1,334 mg Oral TID WM Michelle Lainez, NP   1,334 mg at 11/10/23 1734    carvediloL tablet 25 mg  25 mg Oral BID Michelle Lainez NP   25 mg  at 11/10/23 1041    dextrose 10% bolus 125 mL 125 mL  12.5 g Intravenous PRN Michelle Lainez, NP        dextrose 10% bolus 250 mL 250 mL  25 g Intravenous PRN Michelle Lainez NP        [START ON 11/11/2023] furosemide injection 80 mg  80 mg Intravenous Daily Ivan Hall MD        glucagon (human recombinant) injection 1 mg  1 mg Intramuscular PRN Michelle Lainez, NP        glucose chewable tablet 16 g  16 g Oral PRN Michelle Lainez, NP        glucose chewable tablet 24 g  24 g Oral PRN Michelle Lainez, NP        heparin (porcine) injection 5,000 Units  5,000 Units Subcutaneous Q8H Michelle Lainez, NP   5,000 Units at 11/10/23 1433    hydrALAZINE tablet 50 mg  50 mg Oral Q8H Michelle Lainez, NP   50 mg at 11/10/23 1433    melatonin tablet 6 mg  6 mg Oral Nightly PRN Michelle Lainez, NP        naloxone 0.4 mg/mL injection 0.02 mg  0.02 mg Intravenous PRN Michelle Lainez, NADIYA        ondansetron injection 4 mg  4 mg Intravenous Q8H PRN Michelle Lainez, NP        sodium bicarbonate tablet 1,300 mg  1,300 mg Oral TID Michelle Lainez, NP   1,300 mg at 11/10/23 1433    sodium chloride 0.9% flush 10 mL  10 mL Intravenous Q12H PRN Michelle Lainez, NP        sodium chloride 0.9% flush 10 mL  10 mL Intravenous PRN Sasha Galloway MD        sodium zirconium cyclosilicate packet 10 g  10 g Oral Daily Michelle Lainez, NP   10 g at 11/10/23 1432       [unfilled]    Review of patient's allergies indicates:  No Known Allergies     Review of Systems   Constitutional: Negative.    HENT: Negative.     Eyes: Negative.    Respiratory:  Positive for shortness of breath.    Cardiovascular:  Positive for orthopnea.   Gastrointestinal: Negative.    Genitourinary: Negative.           Physical Exam  Constitutional:       Appearance: Normal appearance.   HENT:      Head: Normocephalic.      Nose: Nose normal.   Eyes:      Pupils: Pupils are equal, round, and reactive to light.   Cardiovascular:      Rate  and Rhythm: Normal rate.      Pulses: Normal pulses.   Pulmonary:      Breath sounds: Rales present.   Musculoskeletal:      Cervical back: Normal range of motion.      Right lower leg: Edema present.      Left lower leg: Edema present.   Neurological:      Mental Status: He is alert.          Problem List Items Addressed This Visit          Unprioritized    CKD (chronic kidney disease), stage V - Primary    * (Principal) Fluid overload     Other Visit Diagnoses       Shortness of breath        Chest pain        Elevated brain natriuretic peptide (BNP) level               Impression and plan   CKD V for initiation of dialysis. Will insert a cuffed tunneled HD catheter on Monday morning. Please send PT and PTT on Sunday morning. Hold al anticoagulation on Sunday evening. Keep him NPO after midnight on Sunday.    Fluid overload with edema.     HTN. Will need better control.     DM    Hyperkalemia on Lokelma.     CHANDLER JOYNER.Lauryn. MD. WADE. FACP.  , [employer] / [employer] ([place]  Nephrology Consultant. [facility].   [facility]. 5th [place] [address].    email: [email or web address]  Tel: Office: [phone]

## 2023-11-10 NOTE — ASSESSMENT & PLAN NOTE
Body mass index is 39.15 kg/m². Morbid obesity complicates all aspects of disease management from diagnostic modalities to treatment. Weight loss encouraged and health benefits explained to patient.

## 2023-11-10 NOTE — CARE UPDATE
Patient came last night  With shortness of breath  Scheduled for line placement today  Given lasix 80mg at night, good urine output, improvement in symptoms

## 2023-11-10 NOTE — ASSESSMENT & PLAN NOTE
Patient's anemia is currently controlled. Has not received any PRBCs to date. Etiology likely d/t chronic disease due to Chronic Kidney Disease/ESRD  Current CBC reviewed-   Lab Results   Component Value Date    HGB 7.4 (L) 11/10/2023    HCT 20.9 (L) 11/10/2023     Monitor serial CBC and transfuse if patient becomes hemodynamically unstable, symptomatic or H/H drops below 7/21.  Consented for blood.

## 2023-11-10 NOTE — PLAN OF CARE
[facility] [facility] - Transplant Stepdown  Initial Discharge Assessment       Primary Care Provider: Abhi Peralta MD    Admission Diagnosis: Shortness of breath [R06.02]  Elevated brain natriuretic peptide (BNP) level [R79.89]  Fluid overload [E87.70]  Chest pain [R07.9]    Admission Date: 11/9/2023  Expected Discharge Date: 11/13/2023    Transition of Care Barriers: Underinsured    Payor: MEDICAID / Plan: HE[place]UE [place] / Product Type: Managed Medicaid /     Extended Emergency Contact Information  Primary Emergency Contact: varinder matthew  Mobile Phone: [phone]  Relation: Spouse    Discharge Plan A: Home with family  Discharge Plan B: Home      [facility]  [address]  [place] [address]  Phone: [phone] Fax: [phone]    [id number] - [place], [place] - [address]  [address]  [place] [address]  Phone: [phone] Fax: [phone]      Initial Assessment (most recent)       Adult Discharge Assessment - 11/10/23 1608          Discharge Assessment    Assessment Type Discharge Planning Assessment     Confirmed/corrected address, phone number and insurance Yes     Confirmed Demographics Correct on Facesheet     Source of Information family     If unable to respond/provide information was family/caregiver contacted? Yes   patient was sleeping    Contact Name/Number Varinder matthew (spouse) [phone]     Communicated [place] with patient/caregiver Date not available/Unable to determine     Reason For Admission Fluid Overload     People in Home spouse     Facility Arrived From: home     Do you expect to return to your current living situation? Yes     Do you have help at home or someone to help you manage your care at home? Yes     Who are your caregiver(s) and their phone number(s)? Varinder matthew (spouse) [phone]     Prior to hospitilization cognitive status: Alert/Oriented     Current cognitive status: Unable to Assess   patient respirations even and  unlabored and wife asked to let him rest.    Walking or Climbing Stairs --   none    Dressing/Bathing --   none    Equipment Currently Used at Home none     Readmission within 30 days? Yes     Patient currently being followed by outpatient case management? No     Do you currently have service(s) that help you manage your care at home? No     Do you take prescription medications? Yes     Do you have prescription coverage? Yes     Coverage Medicaid [employer] [place] [employer] [place]     Do you have any problems affording any of your prescribed medications? No     Is the patient taking medications as prescribed? yes     Who is going to help you get home at discharge? Jana matthew (spouse) [phone]     How do you get to doctors appointments? car, drives self;family or friend will provide     Are you on dialysis? No     Do you take coumadin? No     DME Needed Upon Discharge  none     Discharge Plan discussed with: Spouse/sig other     Name(s) and Number(s) Jana matthew (spouse) [phone]     Transition of Care Barriers Underinsured     Discharge Plan A Home with family     Discharge Plan B Home        OTHER    Name(s) of People in Home Jana matthew (spouse) [phone]                     Readmission Assessment (most recent)       Readmission Assessment - 11/10/23 1616          Readmission    Why were you hospitalized in the last 30 days? because the PCP  called him to come to the hospiral due to abnormal labs (kidney)     Why were you readmitted? Alarmed about signs/symptoms     When you left the hospital how did you feel? good     When you left the hospital where did you go? Home with Family     Did patient/caregiver refused recommended DC plan? No     Tell me about what happened between when you left the hospital and the day you returned. started feeling short of breath     Did you have  a follow-up appointment on discharge? Yes     Did you go? Yes     Was this a planned readmission? No                       DIAMANTE met with the patient and spouse at the bedside. Patient was with eyes closed and respirations even and unlabored , in no distress. Wife requested I let him rest and she verified the demographics. Discussed the discharge process and gave her the discharge booklet . [facility] has their own . Patient lives in a trailer with 3 steps and lives with his spouse. She will bring him home on discharge. Patient PCP is Dr. Yanick Hensley and he owns no DME, is not on coumadin and not dialysis as of yet. Patient returned to hospital because she stated he was short of breath. Wife request his pharmacy be called and not interested in bedside delivery.     Diana Kwok RN, CM   [phone]

## 2023-11-10 NOTE — ASSESSMENT & PLAN NOTE
Body mass index is 39.16 kg/m². Morbid obesity complicates all aspects of disease management from diagnostic modalities to treatment. Weight loss encouraged and health benefits explained to patient.

## 2023-11-10 NOTE — H&P (VIEW-ONLY)
"Chief Complaint   Patient presents with    Shortness of Breath     Dialysis patient/ complains of edema        HPI:  I have seen Mr. Bain for a cuffed tunneled HD catheter insertion. HE is known to have HTN, DM, CLD V presented with volume overload. I have explained the procedure to him with all the complications.  Including bleeding, pain and infection. He is scheduled for Monday morning. Please send PT and PTT on Sunday morning. Hold al anticoagulation on Sunday evening. Keep him NPO after midnight on Sunday.    Past Medical History:   Diagnosis Date    CKD (chronic kidney disease), stage V 10/21/2023    Diabetes mellitus     Diabetes mellitus, type 2     H/O esophagogastroduodenoscopy     Hypertension     Urinary tract infection     History       Past Surgical History:   Procedure Laterality Date    ESOPHAGOGASTRODUODENOSCOPY N/A 03/13/2020    Procedure: EGD (ESOPHAGOGASTRODUODENOSCOPY);  Surgeon: Katiuska العراقي MD;  Location: [facility] ENDO;  Service: Endoscopy;  Laterality: N/A;    LAPAROSCOPIC CHOLECYSTECTOMY N/A 06/22/2020    Procedure: CHOLECYSTECTOMY, LAPAROSCOPIC;  Surgeon: Dayron Conner MD;  Location: [facility] OR;  Service: General;  Laterality: N/A;       Family History   Problem Relation Age of Onset    Prostate cancer Neg Hx     Kidney disease Neg Hx        Social History     Socioeconomic History    Marital status:      Spouse name: Jana Marcelo   Tobacco Use    Smoking status: Former     Types: Cigarettes    Smokeless tobacco: Never    Tobacco comments:     quit "long time ago"   Substance and Sexual Activity    Alcohol use: Yes     Alcohol/week: 1.0 standard drink of alcohol     Types: 1 Cans of beer per week     Comment: social    Drug use: Never    Sexual activity: Yes     Partners: Female   Social History Narrative    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     Social Determinants of Health     Financial Resource Strain: Low Risk  (10/23/2023)    Overall Financial Resource Strain (CARDIA)     Difficulty of " Paying Living Expenses: Not hard at all   Food Insecurity: No Food Insecurity (10/23/2023)    Hunger Vital Sign     Worried About Running Out of Food in the Last Year: Never true     Ran Out of Food in the Last Year: Never true   Transportation Needs: No Transportation Needs (10/23/2023)    PRAPARE - Transportation     Lack of Transportation (Medical): No     Lack of Transportation (Non-Medical): No   Physical Activity: Sufficiently Active (10/23/2023)    Exercise Vital Sign     Days of Exercise per Week: 5 days     Minutes of Exercise per Session: 30 min   Stress: Stress Concern Present (10/23/2023)    [employer] [place] [employer]ational [employer] - Occupational Stress Questionnaire     Feeling of Stress : To some extent   Social Connections: Moderately Isolated (10/23/2023)    Social Connection and Isolation Panel [NHANES]     Frequency of Communication with Friends and Family: More than three times a week     Frequency of Social Gatherings with Friends and Family: More than three times a week     Attends Gnosticism Services: Never     Active Member of Clubs or Organizations: No     Attends Club or Organization Meetings: Never     Marital Status:    Housing Stability: Low Risk  (10/23/2023)    Housing Stability Vital Sign     Unable to Pay for Housing in the Last Year: No     Number of Places Lived in the Last Year: 1     Unstable Housing in the Last Year: No       Current Facility-Administered Medications   Medication Dose Route Frequency Provider Last Rate Last Admin    acetaminophen tablet 1,000 mg  1,000 mg Oral Q8H PRN Michelle Lainez, NP        acetaminophen tablet 650 mg  650 mg Oral Q6H PRN Michelle Lainez, NP        amLODIPine tablet 10 mg  10 mg Oral Daily Michelle Lainez, NP   10 mg at 11/10/23 1039    calcium acetate(phosphat bind) capsule 1,334 mg  1,334 mg Oral TID WM Michelle Lainez, NP   1,334 mg at 11/10/23 1734    carvediloL tablet 25 mg  25 mg Oral BID Michelle Lainez NP   25 mg  at 11/10/23 1041    dextrose 10% bolus 125 mL 125 mL  12.5 g Intravenous PRN Michelle Lainez, NP        dextrose 10% bolus 250 mL 250 mL  25 g Intravenous PRN Michelle Lainez NP        [START ON 11/11/2023] furosemide injection 80 mg  80 mg Intravenous Daily Ivan Hall MD        glucagon (human recombinant) injection 1 mg  1 mg Intramuscular PRN Michelle Lainez, NP        glucose chewable tablet 16 g  16 g Oral PRN Michelle Lainez, NP        glucose chewable tablet 24 g  24 g Oral PRN Michelle Lainez, NP        heparin (porcine) injection 5,000 Units  5,000 Units Subcutaneous Q8H Michelle Lainez, NP   5,000 Units at 11/10/23 1433    hydrALAZINE tablet 50 mg  50 mg Oral Q8H Michelle Lainez, NP   50 mg at 11/10/23 1433    melatonin tablet 6 mg  6 mg Oral Nightly PRN Micehlle Lainez, NP        naloxone 0.4 mg/mL injection 0.02 mg  0.02 mg Intravenous PRN Michelle Lainez, NADIYA        ondansetron injection 4 mg  4 mg Intravenous Q8H PRN Michelle Lainez, NP        sodium bicarbonate tablet 1,300 mg  1,300 mg Oral TID Michelle Lainez, NP   1,300 mg at 11/10/23 1433    sodium chloride 0.9% flush 10 mL  10 mL Intravenous Q12H PRN Michelle Lainez, NP        sodium chloride 0.9% flush 10 mL  10 mL Intravenous PRN Sasha Galloway MD        sodium zirconium cyclosilicate packet 10 g  10 g Oral Daily Michelle Lainez, NP   10 g at 11/10/23 1432       [unfilled]    Review of patient's allergies indicates:  No Known Allergies     Review of Systems   Constitutional: Negative.    HENT: Negative.     Eyes: Negative.    Respiratory:  Positive for shortness of breath.    Cardiovascular:  Positive for orthopnea.   Gastrointestinal: Negative.    Genitourinary: Negative.           Physical Exam  Constitutional:       Appearance: Normal appearance.   HENT:      Head: Normocephalic.      Nose: Nose normal.   Eyes:      Pupils: Pupils are equal, round, and reactive to light.   Cardiovascular:      Rate  and Rhythm: Normal rate.      Pulses: Normal pulses.   Pulmonary:      Breath sounds: Rales present.   Musculoskeletal:      Cervical back: Normal range of motion.      Right lower leg: Edema present.      Left lower leg: Edema present.   Neurological:      Mental Status: He is alert.          Problem List Items Addressed This Visit          Unprioritized    CKD (chronic kidney disease), stage V - Primary    * (Principal) Fluid overload     Other Visit Diagnoses       Shortness of breath        Chest pain        Elevated brain natriuretic peptide (BNP) level               Impression and plan   CKD V for initiation of dialysis. Will insert a cuffed tunneled HD catheter on Monday morning. Please send PT and PTT on Sunday morning. Hold al anticoagulation on Sunday evening. Keep him NPO after midnight on Sunday.    Fluid overload with edema.     HTN. Will need better control.     DM    Hyperkalemia on Lokelma.     CHANDLER JOYNER.Lauryn. MD. WADE. FACP.  , [employer] / [employer] ([place]  Nephrology Consultant. [facility].   [facility]. 5th [place] [address].    email: [email or web address]  Tel: Office: [phone]

## 2023-11-10 NOTE — ASSESSMENT & PLAN NOTE
Chronic, controlled. Latest blood pressure and vitals reviewed-     Temp:  [97.8 °F (36.6 °C)-98.2 °F (36.8 °C)]   Pulse:  [62-70]   Resp:  [14-20]   BP: (141-160)/(67-74)   SpO2:  [94 %-98 %] .   Home meds for hypertension were reviewed and noted below.   Hypertension Medications             amLODIPine (NORVASC) 10 MG tablet Take 1 tablet (10 mg total) by mouth once daily.    carvediloL (COREG) 25 MG tablet Take 1 tablet (25 mg total) by mouth 2 (two) times daily.    hydrALAZINE (APRESOLINE) 50 MG tablet Take 1 tablet (50 mg total) by mouth every 8 (eight) hours.          While in the hospital, will manage blood pressure as follows; Continue home antihypertensive regimen    Will utilize PRN. blood pressure medication only if patient's blood pressure greater than 160/100 and he develops symptoms such as worsening chest pain or shortness of breath.

## 2023-11-10 NOTE — CONSULTS
54 y/o M with PMHx DM2, HTN, CKD5, and anemia who was admitted from the ED on 11/9/23 for SOB and hypervolemia. IR consulted for TDC placement. Pt has yet to initiate HD and currently does not have HD access. He was scheduled to undergo TDC placement at an OSH today. Nephrology consulted, awaiting recs. Informed ordering provider that interventional nephrology is currently on the schedule for tunneled HD cath placement. Recommend non-tunneled trialysis placement if urgent HD is needed. Discussed with ordering provider via epic secure chat.     India Ramos PA-C  Interventional Radiology   Spectra: [phone]

## 2023-11-10 NOTE — PROGRESS NOTES
"SW consulted for outpt HD coordination.    Upon record review from pt's Nephrology office visit on 10/30 with Katiuska Desouza MD: "admit to [facility] for [facility] initiation"    MIGUELINA contacted [facility] and spoke with clinic MIGUELINA Brown ([phone]). Per tSephanie, pt has been accepted for outpt dialysis at this clinic pending confirmation of dialysis access placement. Pt will follow on a T/Th/S at 2:00pm schedule. Pt will need to arrive at 1:45pm for initial treatment to complete required paperwork.    SW to send dialysis access operative notes to [facility] when available.    Inpt treatment team updated via secure chat.    SW to continue to follow with updates.    Carolynn Cooper LMSW  [facility]  [phone]    "

## 2023-11-10 NOTE — ASSESSMENT & PLAN NOTE
- Phos 5.7 on admit labs, improved from previous admission.  - Continue phosphorus binders.  - Renal diet

## 2023-11-10 NOTE — SUBJECTIVE & OBJECTIVE
"Past Medical History:   Diagnosis Date    CKD (chronic kidney disease), stage V 10/21/2023    Diabetes mellitus     Hypertension     Urinary tract infection        Past Surgical History:   Procedure Laterality Date    ESOPHAGOGASTRODUODENOSCOPY N/A 3/13/2020    Procedure: EGD (ESOPHAGOGASTRODUODENOSCOPY);  Surgeon: Katiuska العراقي MD;  Location: [facility] ENDO;  Service: Endoscopy;  Laterality: N/A;    LAPAROSCOPIC CHOLECYSTECTOMY N/A 6/22/2020    Procedure: CHOLECYSTECTOMY, LAPAROSCOPIC;  Surgeon: Dayron Conner MD;  Location: [facility] OR;  Service: General;  Laterality: N/A;       Review of patient's allergies indicates:  No Known Allergies    No current facility-administered medications on file prior to encounter.     Current Outpatient Medications on File Prior to Encounter   Medication Sig    amLODIPine (NORVASC) 10 MG tablet Take 1 tablet (10 mg total) by mouth once daily.    calcium acetate,phosphat bind, (PHOSLO) 667 mg capsule Take 2 capsules (1,334 mg total) by mouth 3 (three) times daily with meals.    carvediloL (COREG) 25 MG tablet Take 1 tablet (25 mg total) by mouth 2 (two) times daily.    hydrALAZINE (APRESOLINE) 50 MG tablet Take 1 tablet (50 mg total) by mouth every 8 (eight) hours.    sodium bicarbonate 650 MG tablet Take 2 tablets (1,300 mg total) by mouth 3 (three) times daily.    sodium zirconium cyclosilicate (LOKELMA) 10 gram packet Take 1 packet (10 g total) by mouth once daily. Mix entire contents of packet(s) into drinking glass containing 3 tablespoons of water; stir well and drink immediately. Add water and repeat until no powder remains to receive entire dose.     Family History    None       Tobacco Use    Smoking status: Former     Types: Cigarettes    Smokeless tobacco: Never    Tobacco comments:     quit "long time ago"   Substance and Sexual Activity    Alcohol use: Yes     Alcohol/week: 1.0 standard drink of alcohol     Types: 1 Cans of beer per week     Comment: social    Drug use: " Never    Sexual activity: Yes     Partners: Female     Review of Systems   Constitutional:  Positive for fatigue and unexpected weight change (15lbs wt gain x1 month). Negative for appetite change, chills, diaphoresis and fever.   HENT:  Negative for congestion, rhinorrhea and sore throat.    Eyes:  Negative for photophobia and visual disturbance.   Respiratory:  Positive for cough (non productive) and shortness of breath. Negative for wheezing.    Cardiovascular:  Positive for leg swelling. Negative for chest pain and palpitations.   Gastrointestinal:  Positive for abdominal distention and nausea. Negative for abdominal pain, diarrhea and vomiting.   Genitourinary:  Positive for decreased urine volume. Negative for dysuria and frequency.   Musculoskeletal:  Negative for back pain, myalgias and neck pain.   Skin:  Negative for color change, pallor, rash and wound.   Neurological:  Positive for light-headedness. Negative for dizziness, syncope, weakness and headaches.   Psychiatric/Behavioral:  Negative for confusion and sleep disturbance. The patient is not nervous/anxious.      Objective:     Vital Signs (Most Recent):  Temp: 98.2 °F (36.8 °C) (11/09/23 1558)  Pulse: 64 (11/09/23 1900)  Resp: 14 (11/09/23 1900)  BP: (!) 158/74 (11/09/23 1900)  SpO2: 96 % (11/09/23 1900) Vital Signs (24h Range):  Temp:  [98.2 °F (36.8 °C)] 98.2 °F (36.8 °C)  Pulse:  [64-68] 64  Resp:  [14-20] 14  SpO2:  [96 %-98 %] 96 %  BP: (151-158)/(69-74) 158/74     Weight: 113.4 kg (250 lb)  Body mass index is 39.16 kg/m².     Physical Exam  Vitals and nursing note reviewed.   Constitutional:       General: He is sleeping. He is not in acute distress.     Appearance: He is obese. He is not toxic-appearing or diaphoretic.   HENT:      Head: Normocephalic and atraumatic.      Nose: Nose normal.      Mouth/Throat:      Mouth: Mucous membranes are moist.   Eyes:      Pupils: Pupils are equal, round, and reactive to light.   Cardiovascular:       Rate and Rhythm: Normal rate and regular rhythm.      Pulses: Normal pulses.      Heart sounds: No murmur heard.  Pulmonary:      Effort: Pulmonary effort is normal. No respiratory distress.      Breath sounds: Rales present. No wheezing or rhonchi.   Abdominal:      General: Bowel sounds are normal. There is distension (mild).      Palpations: Abdomen is soft.      Tenderness: There is no abdominal tenderness. There is no guarding.   Musculoskeletal:         General: No tenderness or deformity. Normal range of motion.      Cervical back: Normal range of motion.      Right lower leg: Edema present.      Left lower leg: Edema present.   Skin:     General: Skin is warm and dry.      Capillary Refill: Capillary refill takes less than 2 seconds.   Neurological:      General: No focal deficit present.      Mental Status: He is oriented to person, place, and time and easily aroused.      Sensory: No sensory deficit.      Motor: No weakness.   Psychiatric:         Mood and Affect: Mood normal.         Behavior: Behavior normal.              CRANIAL NERVES     CN III, IV, VI   Pupils are equal, round, and reactive to light.       Significant Labs: All pertinent labs within the past 24 hours have been reviewed.  CBC:   Recent Labs   Lab 11/09/23  1735   WBC 7.57   HGB 7.3*   HCT 22.4*        CMP:   Recent Labs   Lab 11/09/23  1735      K 4.5      CO2 25   GLU 99   BUN 91*   CREATININE 11.3*   CALCIUM 8.3*   PROT 6.6   ALBUMIN 3.3*   BILITOT 0.4   ALKPHOS 64   AST 23   ALT 27   ANIONGAP 15     Cardiac Markers:   Recent Labs   Lab 11/09/23  1735   *     Troponin:   Recent Labs   Lab 11/09/23  1735   TROPONINI 0.093*       Significant Imaging: I have reviewed all pertinent imaging results/findings within the past 24 hours.  Imaging Results              X-Ray Chest AP Portable (Final result)  Result time 11/09/23 18:42:03      Final result by Ravi Ta MD (11/09/23 18:42:03)                    Impression:      CHF/volume overload.      Electronically signed by: Ravi Ta  Date:    11/09/2023  Time:    18:42               Narrative:    EXAMINATION:  XR CHEST AP PORTABLE    CLINICAL HISTORY:  CHF;    TECHNIQUE:  Single frontal view of the chest was performed.    COMPARISON:  10/30/2023    FINDINGS:  Enlarged cardiac silhouette with pulmonary vascular congestion.  Diffuse bilateral interstitial edema.  No focal consolidation.  No obvious pleural effusion.

## 2023-11-10 NOTE — ASSESSMENT & PLAN NOTE
Per pt and his wife he is followed by nephrology outpatient. He has an appointment tomorrow for HD tunneled line placement. His wife reports he became acutely short of breath today noting he has had intermittent CAT for several months. Pt endorses associated BLE edema, abdominal distention, cough, 15lb weight gain x1 month, nausea, and decreased UO.  -, Trop 0.093, continue to trend. Patient denies CP.  -CXR with pulmonary edema.  -EKG shows SR, 67 bpm, no ST elevation or depression.  -TTE pending.  -Pt received 80mg IV lasix in the ED, will evaluate UO and possibly repeat.  -Strict I&Os, daily weight.  -NPO after midnight.  -IR consult for HD line placement.  -Nephrology consult for likely initiation of HD for volume management.

## 2023-11-10 NOTE — ASSESSMENT & PLAN NOTE
Patient is no longer on medications.   Last A1c reviewed-   Lab Results   Component Value Date    HGBA1C 5.6 10/21/2023

## 2023-11-10 NOTE — PROGRESS NOTES
Pt. Arrived to [facility] rm [id number] via stretcher. VSS, spo2 >94% on room air. Skin CDI. Bed in low locked position, call light within reach, pt instructed to call for assistance as needed, WCTM.

## 2023-11-10 NOTE — PROGRESS NOTES
"[facility]  Progress Note    Patient Name: Wai Bain  MRN: [id number]  Patient Class: OP- Observation   Admission Date: 11/9/2023  Length of Stay: 0 days  Attending Physician: Ivan Hall MD  Primary Care Provider: Abhi Peralta MD        Subjective:     Principal Problem:Fluid overload        HPI:  Wai Bain is a 53 y.o. male with a PMHx of DM2, HTN, CKD5, and anemia who presents to the ED with complaints of shortness of breath. Per pt and his wife he is followed by nephrology outpatient. He has an appointment tomorrow for HD tunneled line placement. His wife reports he became acutely short of breath today noting he has had intermittent CAT for several months. Pt endorses associated BLE edema, fatigue, abdominal distention, non productive cough, 15lb weight gain x1 month, nausea, and decreased UO. He notes intermittent lightheadedness/ head "fullness" with exertion. The patient's wife reports he was hospitalized about 3 weeks ago at [facility] for anemia and elevated blood pressure. She states his blood pressure have been much better controlled since the addition of several antihypertensives. The patient denies fever, chills, CP, abdominal pain, vomiting, diarrhea, or dysuria.    In the ED, VSS, afebrile. CBC with stable anemia. Cr 11.3 (consistent with CKD5). Calcium 8.3. Albumin 3.3. . Troponin 0.093. EKG with SR, 67 bpm, no ST elevation or depression. CXR reveals pulmonary edema. The patient received 80mg IV lasix.      Overview/Hospital Course:  No notes on file    Interval History:   Patient reports that he feels better after receiving IV lasix overnight and had good UOP. No other complaints. Interventional nephrology consulted for line placement.      Review of Systems   Constitutional:  Negative for activity change, appetite change, chills, diaphoresis, fatigue and fever.   HENT:  Negative for rhinorrhea and sore throat.  "   Respiratory:  Negative for cough, chest tightness and shortness of breath.    Cardiovascular:  Negative for chest pain and palpitations.   Gastrointestinal:  Negative for abdominal pain, constipation, diarrhea and nausea.   Endocrine: Negative for cold intolerance.   Genitourinary:  Negative for decreased urine volume and dysuria.   Musculoskeletal:  Negative for arthralgias and myalgias.   Skin:  Negative for rash and wound.   Neurological:  Negative for dizziness, weakness, numbness and headaches.   Psychiatric/Behavioral:  Negative for agitation, behavioral problems and confusion.      Objective:     Vital Signs (Most Recent):  Temp: 98.2 °F (36.8 °C) (11/10/23 1127)  Pulse: 67 (11/10/23 1127)  Resp: 20 (11/10/23 1127)  BP: (!) 160/73 (11/10/23 1127)  SpO2: 98 % (11/10/23 1127) Vital Signs (24h Range):  Temp:  [97.8 °F (36.6 °C)-98.2 °F (36.8 °C)] 98.2 °F (36.8 °C)  Pulse:  [62-70] 67  Resp:  [14-20] 20  SpO2:  [94 %-98 %] 98 %  BP: (141-160)/(67-74) 160/73     Weight: 113.4 kg (250 lb)  Body mass index is 39.15 kg/m².    Intake/Output Summary (Last 24 hours) at 11/10/2023 1341  Last data filed at 11/10/2023 0608  Gross per 24 hour   Intake --   Output 1175 ml   Net -1175 ml         Physical Exam  Vitals and nursing note reviewed.   Constitutional:       General: He is sleeping. He is not in acute distress.     Appearance: He is obese. He is not toxic-appearing or diaphoretic.   HENT:      Head: Normocephalic and atraumatic.      Nose: Nose normal.      Mouth/Throat:      Mouth: Mucous membranes are moist.   Eyes:      Extraocular Movements: Extraocular movements intact.   Cardiovascular:      Rate and Rhythm: Normal rate and regular rhythm.      Pulses: Normal pulses.      Heart sounds: No murmur heard.  Pulmonary:      Effort: Pulmonary effort is normal. No respiratory distress.      Breath sounds: Rales present. No wheezing or rhonchi.   Abdominal:      General: Bowel sounds are normal. There is distension  (mild).      Palpations: Abdomen is soft.      Tenderness: There is no abdominal tenderness. There is no guarding.   Musculoskeletal:         General: No tenderness or deformity. Normal range of motion.      Cervical back: Normal range of motion.      Right lower leg: Edema present.      Left lower leg: Edema present.   Skin:     General: Skin is warm and dry.      Capillary Refill: Capillary refill takes less than 2 seconds.   Neurological:      General: No focal deficit present.      Mental Status: He is oriented to person, place, and time and easily aroused.      Sensory: No sensory deficit.      Motor: No weakness.   Psychiatric:         Mood and Affect: Mood normal.         Behavior: Behavior normal.             Significant Labs: All pertinent labs within the past 24 hours have been reviewed.  CBC:   Recent Labs   Lab 11/09/23  1735 11/10/23  0655 11/10/23  1041   WBC 7.57 7.17  --    HGB 7.3* 6.7* 7.4*   HCT 22.4* 20.9*  --     147*  --      CMP:   Recent Labs   Lab 11/09/23  1735 11/10/23  0655    142   K 4.5 4.1    103   CO2 25 26   GLU 99 111*   BUN 91* 92*   CREATININE 11.3* 11.5*   CALCIUM 8.3* 8.2*   PROT 6.6 5.9*   ALBUMIN 3.3* 3.0*   BILITOT 0.4 0.3   ALKPHOS 64 55   AST 23 15   ALT 27 21   ANIONGAP 15 13       Significant Imaging: I have reviewed all pertinent imaging results/findings within the past 24 hours.      Assessment/Plan:      * Fluid overload  Per pt and his wife he is followed by nephrology outpatient. He has an appointment tomorrow for HD tunneled line placement. His wife reports he became acutely short of breath today noting he has had intermittent CAT for several months. Pt endorses associated BLE edema, abdominal distention, cough, 15lb weight gain x1 month, nausea, and decreased UO.    -   - Trop peaked at 0.093 likely due to volume OL, patient denied CP on admit  - CXR with pulmonary edema.  - Pt received 80mg IV lasix in the ED and repeat on arrival to the  floor  - TTE 11/10: EF 60%, CVp 8, PASP 47, mild MR, TR  - Nephrology consulted  -- IV Lasix 80mg daily   - Fluid restriction  - Strict I&Os, daily weight.      CKD (chronic kidney disease), stage V  Creatine stable on admission. BMP reviewed- noted Estimated Creatinine Clearance: 8.9 mL/min (A) (based on SCr of 11.5 mg/dL (H)). according to latest data. Based on current GFR, CKD stage is stage 5 - GFR < 15.     - Nephrology consulted  -- Planning for the initiation of HD that initially being setup as outpatient  -- Interventional nephrology consulted for  HD line placement.  - Monitor UOP and serial BMP and adjust therapy as needed.   - Renally dose meds and avoid nephrotoxic medications    Hyperphosphatemia  - Phos 5.7 on admit labs, improved from previous admission.  - Continue phosphorus binders.  - Renal diet    Severe obesity (BMI >= 40)  Body mass index is 39.15 kg/m². Morbid obesity complicates all aspects of disease management from diagnostic modalities to treatment. Weight loss encouraged and health benefits explained to patient.    Anemia due to stage 5 chronic kidney disease, not on chronic dialysis  Patient's anemia is currently controlled. Has not received any PRBCs to date. Etiology likely d/t chronic disease due to Chronic Kidney Disease/ESRD  Current CBC reviewed-   Lab Results   Component Value Date    HGB 7.4 (L) 11/10/2023    HCT 20.9 (L) 11/10/2023     Monitor serial CBC and transfuse if patient becomes hemodynamically unstable, symptomatic or H/H drops below 7/21.  Consented for blood.    Essential hypertension  Chronic, controlled. Latest blood pressure and vitals reviewed-     Temp:  [97.8 °F (36.6 °C)-98.2 °F (36.8 °C)]   Pulse:  [62-70]   Resp:  [14-20]   BP: (141-160)/(67-74)   SpO2:  [94 %-98 %] .   Home meds for hypertension were reviewed and noted below.   Hypertension Medications             amLODIPine (NORVASC) 10 MG tablet Take 1 tablet (10 mg total) by mouth once daily.     carvediloL (COREG) 25 MG tablet Take 1 tablet (25 mg total) by mouth 2 (two) times daily.    hydrALAZINE (APRESOLINE) 50 MG tablet Take 1 tablet (50 mg total) by mouth every 8 (eight) hours.          While in the hospital, will manage blood pressure as follows; Continue home antihypertensive regimen    Will utilize PRN. blood pressure medication only if patient's blood pressure greater than 160/100 and he develops symptoms such as worsening chest pain or shortness of breath.    Type 2 diabetes mellitus with hyperglycemia, without long-term current use of insulin  Patient is no longer on medications.   Last A1c reviewed-   Lab Results   Component Value Date    HGBA1C 5.6 10/21/2023         VTE Risk Mitigation (From admission, onward)         Ordered     heparin (porcine) injection 5,000 Units  Every 8 hours         11/09/23 1937     IP VTE HIGH RISK PATIENT  Once         11/09/23 1937     Place sequential compression device  Until discontinued         11/09/23 1937                Discharge Planning   [place]: 11/13/2023     Code Status: Full Code   Is the patient medically ready for discharge?: No    Reason for patient still in hospital (select all that apply): Patient trending condition, Laboratory test, Treatment, Consult recommendations and Pending disposition                     Ivan Hall MD  Department of Hospital Medicine   [facility] - Transplant Stepdown

## 2023-11-10 NOTE — ASSESSMENT & PLAN NOTE
Baseline GFR 4  Suspect etiology to be chronic uncontrolled DM    Uop since last night 475 after 80mg lasix  We have lab from oct 20 scr 10.49 trending up to 11.3-11.5 today   Plan  Responding to lasix, please give lasix 80 mg today if he look volume up can increase the dose of lasix and monitor urine input output , will plan for dialysis accordingly  Obtain chest Xray  -Referred by outpatient Dr for CKD education, KTxp evaluation, AVF placement (not placed )  -admit to [facility] for HD initiation  -on sodium bicarb 1300mg, lokelma 10g ,   -Tunneled plan on Monday  - avoid nephrotoxic

## 2023-11-10 NOTE — ASSESSMENT & PLAN NOTE
Last week Pt stepped on a nail. It did not break the skin. Tetanus is up to date. Pt states its a little tender but not bad. [place] leaving to go [place] today at 4 and is asking for an Antibiotic to take with him incase it gets infected.   Dev Per primary

## 2023-11-10 NOTE — H&P
"[facility] - Emergency Dept  [facility] Medicine  History & Physical    Patient Name: Wai Bain  MRN: [id number]  Patient Class: OP- Observation  Admission Date: 11/9/2023  Attending Physician: Ivan Hall MD   Primary Care Provider: Abhi Peralta MD         Patient information was obtained from patient, spouse/SO, past medical records, and ER records.     Subjective:     Principal Problem:Fluid overload    Chief Complaint:   Chief Complaint   Patient presents with    Shortness of Breath     Dialysis patient/ complains of edema        HPI: Wai Bain is a 53 y.o. male with a PMHx of DM2, HTN, CKD5, and anemia who presents to the ED with complaints of shortness of breath. Per pt and his wife he is followed by nephrology outpatient. He has an appointment tomorrow for HD tunneled line placement. His wife reports he became acutely short of breath today noting he has had intermittent CAT for several months. Pt endorses associated BLE edema, fatigue, abdominal distention, non productive cough, 15lb weight gain x1 month, nausea, and decreased UO. He notes intermittent lightheadedness/ head "fullness" with exertion. The patient's wife reports he was hospitalized about 3 weeks ago at [facility] for anemia and elevated blood pressure. She states his blood pressure have been much better controlled since the addition of several antihypertensives. The patient denies fever, chills, CP, abdominal pain, vomiting, diarrhea, or dysuria.    In the ED, VSS, afebrile. CBC with stable anemia. Cr 11.3 (consistent with CKD5). Calcium 8.3. Albumin 3.3. . Troponin 0.093. EKG with SR, 67 bpm, no ST elevation or depression. CXR reveals pulmonary edema. The patient received 80mg IV lasix.    Past Medical History:   Diagnosis Date    CKD (chronic kidney disease), stage V 10/21/2023    Diabetes mellitus     Hypertension     Urinary tract infection        Past Surgical History:   Procedure Laterality Date    " "ESOPHAGOGASTRODUODENOSCOPY N/A 3/13/2020    Procedure: EGD (ESOPHAGOGASTRODUODENOSCOPY);  Surgeon: Katiuska العراقي MD;  Location: [facility] ENDO;  Service: Endoscopy;  Laterality: N/A;    LAPAROSCOPIC CHOLECYSTECTOMY N/A 6/22/2020    Procedure: CHOLECYSTECTOMY, LAPAROSCOPIC;  Surgeon: Dayron Conner MD;  Location: [facility] OR;  Service: General;  Laterality: N/A;       Review of patient's allergies indicates:  No Known Allergies    No current facility-administered medications on file prior to encounter.     Current Outpatient Medications on File Prior to Encounter   Medication Sig    amLODIPine (NORVASC) 10 MG tablet Take 1 tablet (10 mg total) by mouth once daily.    calcium acetate,phosphat bind, (PHOSLO) 667 mg capsule Take 2 capsules (1,334 mg total) by mouth 3 (three) times daily with meals.    carvediloL (COREG) 25 MG tablet Take 1 tablet (25 mg total) by mouth 2 (two) times daily.    hydrALAZINE (APRESOLINE) 50 MG tablet Take 1 tablet (50 mg total) by mouth every 8 (eight) hours.    sodium bicarbonate 650 MG tablet Take 2 tablets (1,300 mg total) by mouth 3 (three) times daily.    sodium zirconium cyclosilicate (LOKELMA) 10 gram packet Take 1 packet (10 g total) by mouth once daily. Mix entire contents of packet(s) into drinking glass containing 3 tablespoons of water; stir well and drink immediately. Add water and repeat until no powder remains to receive entire dose.     Family History    None       Tobacco Use    Smoking status: Former     Types: Cigarettes    Smokeless tobacco: Never    Tobacco comments:     quit "long time ago"   Substance and Sexual Activity    Alcohol use: Yes     Alcohol/week: 1.0 standard drink of alcohol     Types: 1 Cans of beer per week     Comment: social    Drug use: Never    Sexual activity: Yes     Partners: Female     Review of Systems   Constitutional:  Positive for fatigue and unexpected weight change (15lbs wt gain x1 month). Negative for appetite change, chills, diaphoresis " and fever.   HENT:  Negative for congestion, rhinorrhea and sore throat.    Eyes:  Negative for photophobia and visual disturbance.   Respiratory:  Positive for cough (non productive) and shortness of breath. Negative for wheezing.    Cardiovascular:  Positive for leg swelling. Negative for chest pain and palpitations.   Gastrointestinal:  Positive for abdominal distention and nausea. Negative for abdominal pain, diarrhea and vomiting.   Genitourinary:  Positive for decreased urine volume. Negative for dysuria and frequency.   Musculoskeletal:  Negative for back pain, myalgias and neck pain.   Skin:  Negative for color change, pallor, rash and wound.   Neurological:  Positive for light-headedness. Negative for dizziness, syncope, weakness and headaches.   Psychiatric/Behavioral:  Negative for confusion and sleep disturbance. The patient is not nervous/anxious.      Objective:     Vital Signs (Most Recent):  Temp: 98.2 °F (36.8 °C) (11/09/23 1558)  Pulse: 64 (11/09/23 1900)  Resp: 14 (11/09/23 1900)  BP: (!) 158/74 (11/09/23 1900)  SpO2: 96 % (11/09/23 1900) Vital Signs (24h Range):  Temp:  [98.2 °F (36.8 °C)] 98.2 °F (36.8 °C)  Pulse:  [64-68] 64  Resp:  [14-20] 14  SpO2:  [96 %-98 %] 96 %  BP: (151-158)/(69-74) 158/74     Weight: 113.4 kg (250 lb)  Body mass index is 39.16 kg/m².     Physical Exam  Vitals and nursing note reviewed.   Constitutional:       General: He is sleeping. He is not in acute distress.     Appearance: He is obese. He is not toxic-appearing or diaphoretic.   HENT:      Head: Normocephalic and atraumatic.      Nose: Nose normal.      Mouth/Throat:      Mouth: Mucous membranes are moist.   Eyes:      Pupils: Pupils are equal, round, and reactive to light.   Cardiovascular:      Rate and Rhythm: Normal rate and regular rhythm.      Pulses: Normal pulses.      Heart sounds: No murmur heard.  Pulmonary:      Effort: Pulmonary effort is normal. No respiratory distress.      Breath sounds: Rales  present. No wheezing or rhonchi.   Abdominal:      General: Bowel sounds are normal. There is distension (mild).      Palpations: Abdomen is soft.      Tenderness: There is no abdominal tenderness. There is no guarding.   Musculoskeletal:         General: No tenderness or deformity. Normal range of motion.      Cervical back: Normal range of motion.      Right lower leg: Edema present.      Left lower leg: Edema present.   Skin:     General: Skin is warm and dry.      Capillary Refill: Capillary refill takes less than 2 seconds.   Neurological:      General: No focal deficit present.      Mental Status: He is oriented to person, place, and time and easily aroused.      Sensory: No sensory deficit.      Motor: No weakness.   Psychiatric:         Mood and Affect: Mood normal.         Behavior: Behavior normal.              CRANIAL NERVES     CN III, IV, VI   Pupils are equal, round, and reactive to light.       Significant Labs: All pertinent labs within the past 24 hours have been reviewed.  CBC:   Recent Labs   Lab 11/09/23  1735   WBC 7.57   HGB 7.3*   HCT 22.4*        CMP:   Recent Labs   Lab 11/09/23  1735      K 4.5      CO2 25   GLU 99   BUN 91*   CREATININE 11.3*   CALCIUM 8.3*   PROT 6.6   ALBUMIN 3.3*   BILITOT 0.4   ALKPHOS 64   AST 23   ALT 27   ANIONGAP 15     Cardiac Markers:   Recent Labs   Lab 11/09/23  1735   *     Troponin:   Recent Labs   Lab 11/09/23  1735   TROPONINI 0.093*       Significant Imaging: I have reviewed all pertinent imaging results/findings within the past 24 hours.  Imaging Results              X-Ray Chest AP Portable (Final result)  Result time 11/09/23 18:42:03      Final result by Ravi Ta MD (11/09/23 18:42:03)                   Impression:      CHF/volume overload.      Electronically signed by: Ravi Ta  Date:    11/09/2023  Time:    18:42               Narrative:    EXAMINATION:  XR CHEST AP PORTABLE    CLINICAL  HISTORY:  CHF;    TECHNIQUE:  Single frontal view of the chest was performed.    COMPARISON:  10/30/2023    FINDINGS:  Enlarged cardiac silhouette with pulmonary vascular congestion.  Diffuse bilateral interstitial edema.  No focal consolidation.  No obvious pleural effusion.                                      Assessment/Plan:     * Fluid overload  Per pt and his wife he is followed by nephrology outpatient. He has an appointment tomorrow for HD tunneled line placement. His wife reports he became acutely short of breath today noting he has had intermittent CAT for several months. Pt endorses associated BLE edema, abdominal distention, cough, 15lb weight gain x1 month, nausea, and decreased UO.  -, Trop 0.093, continue to trend. Patient denies CP.  -CXR with pulmonary edema.  -EKG shows SR, 67 bpm, no ST elevation or depression.  -TTE pending.  -Pt received 80mg IV lasix in the ED, will evaluate UO and possibly repeat.  -Strict I&Os, daily weight.  -NPO after midnight.  -IR consult for HD line placement.  -Nephrology consult for likely initiation of HD for volume management.     Hyperphosphatemia  -Phos 5.7 on labs, much improved from previous admission.  -Continue phosphorus binders.  -Renal diet.    Severe obesity (BMI >= 40)  Body mass index is 39.16 kg/m². Morbid obesity complicates all aspects of disease management from diagnostic modalities to treatment. Weight loss encouraged and health benefits explained to patient.    CKD (chronic kidney disease), stage V  -See fluid overload.  -Creatine stable for now. BMP reviewed- noted Estimated Creatinine Clearance: 9.1 mL/min (A) (based on SCr of 11.3 mg/dL (H)). according to latest data. Based on current GFR, CKD stage is stage 5 - GFR < 15.   -Monitor UOP and serial BMP and adjust therapy as needed.   -Renally dose meds. Avoid nephrotoxic medications and procedures.    Anemia due to stage 5 chronic kidney disease, not on chronic dialysis  Patient's anemia is  currently controlled. Has not received any PRBCs to date. Etiology likely d/t chronic disease due to Chronic Kidney Disease/ESRD  Current CBC reviewed-   Lab Results   Component Value Date    HGB 7.3 (L) 11/09/2023    HCT 22.4 (L) 11/09/2023     Monitor serial CBC and transfuse if patient becomes hemodynamically unstable, symptomatic or H/H drops below 7/21.    Essential hypertension  Chronic, controlled. Latest blood pressure and vitals reviewed-     Temp:  [98.2 °F (36.8 °C)]   Pulse:  [64-68]   Resp:  [14-20]   BP: (151-158)/(69-74)   SpO2:  [96 %-98 %] .   Home meds for hypertension were reviewed and noted below.   Hypertension Medications               amLODIPine (NORVASC) 10 MG tablet Take 1 tablet (10 mg total) by mouth once daily.    carvediloL (COREG) 25 MG tablet Take 1 tablet (25 mg total) by mouth 2 (two) times daily.    hydrALAZINE (APRESOLINE) 50 MG tablet Take 1 tablet (50 mg total) by mouth every 8 (eight) hours.            While in the hospital, will manage blood pressure as follows; Continue home antihypertensive regimen    Will utilize p.r.n. blood pressure medication only if patient's blood pressure greater than 180/110 and he develops symptoms such as worsening chest pain or shortness of breath.    Type 2 diabetes mellitus with hyperglycemia, without long-term current use of insulin  Patient is no longer on medications.   Last A1c reviewed-   Lab Results   Component Value Date    HGBA1C 5.6 10/21/2023         VTE Risk Mitigation (From admission, onward)           Ordered     heparin (porcine) injection 5,000 Units  Every 8 hours         11/09/23 1937     IP VTE HIGH RISK PATIENT  Once         11/09/23 1937     Place sequential compression device  Until discontinued         11/09/23 1937                         On 11/09/2023, patient should be placed in hospital observation services under my care in collaboration with Orlando Stanton MD.    Language barrier identified, Interpretive services offered  ( SHEREE, live  or international language line) at no charge to patient, but patient and wife refused. Wife assisted in translating.       Michelle Lainez NP  Department of Hospital Medicine  [facility] - Emergency Dept    N/A  Family history non-contributory to current problem

## 2023-11-10 NOTE — ASSESSMENT & PLAN NOTE
Creatine stable on admission. BMP reviewed- noted Estimated Creatinine Clearance: 8.9 mL/min (A) (based on SCr of 11.5 mg/dL (H)). according to latest data. Based on current GFR, CKD stage is stage 5 - GFR < 15.     - Nephrology consulted  -- Planning for the initiation of HD that initially being setup as outpatient  -- Interventional nephrology consulted for  HD line placement.  - Monitor UOP and serial BMP and adjust therapy as needed.   - Renally dose meds and avoid nephrotoxic medications

## 2023-11-10 NOTE — ED PROVIDER NOTES
"Encounter Date: 11/9/2023       History     Chief Complaint   Patient presents with    Shortness of Breath     Dialysis patient/ complains of edema     Wai Bain is a 53 y.o. male with PMH of diabetes, CKD, hypertension, [place] [facility] [place].  Patient reports intermittent dyspnea on exertion for the last several months.  Shortness of breath acutely worsened today.  Reports getting lightheaded with exertion which caused him to present to the emergency department.  Denies fever, chills, rhinorrhea, sore throat.  Patient is due to get dialysis port placed tomorrow.        Review of patient's allergies indicates:  No Known Allergies  Past Medical History:   Diagnosis Date    CKD (chronic kidney disease), stage V 10/21/2023    Diabetes mellitus     Hypertension     Urinary tract infection      Past Surgical History:   Procedure Laterality Date    ESOPHAGOGASTRODUODENOSCOPY N/A 3/13/2020    Procedure: EGD (ESOPHAGOGASTRODUODENOSCOPY);  Surgeon: Katiuska العراقي MD;  Location: [facility] ENDO;  Service: Endoscopy;  Laterality: N/A;    LAPAROSCOPIC CHOLECYSTECTOMY N/A 6/22/2020    Procedure: CHOLECYSTECTOMY, LAPAROSCOPIC;  Surgeon: Dayron Conner MD;  Location: [facility] OR;  Service: General;  Laterality: N/A;     Family History   Problem Relation Age of Onset    Prostate cancer Neg Hx     Kidney disease Neg Hx      Social History     Tobacco Use    Smoking status: Former     Types: Cigarettes    Smokeless tobacco: Never    Tobacco comments:     quit "long time ago"   Substance Use Topics    Alcohol use: Yes     Alcohol/week: 1.0 standard drink of alcohol     Types: 1 Cans of beer per week     Comment: social    Drug use: Never     Review of Systems   Constitutional:  Negative for fever.   HENT:  Negative for congestion, rhinorrhea and sore throat.    Eyes:  Negative for visual disturbance.   Respiratory:  Positive for shortness of breath. Negative for cough.    Cardiovascular:  Negative for " chest pain and leg swelling.   Gastrointestinal:  Negative for abdominal pain, diarrhea, nausea and vomiting.   Genitourinary:  Negative for dysuria and hematuria.   Neurological:  Negative for weakness.       Physical Exam     Initial Vitals [11/09/23 1558]   BP Pulse Resp Temp SpO2   (!) 157/69 68 20 98.2 °F (36.8 °C) 98 %      MAP       --         Physical Exam    Nursing note and vitals reviewed.  Constitutional: He appears well-developed and well-nourished. He is cooperative.  Non-toxic appearance. He does not appear ill.   HENT:   Head: Normocephalic and atraumatic.   Mouth/Throat: Mucous membranes are normal. Mucous membranes are not dry.   Eyes: Conjunctivae are normal. Pupils are equal, round, and reactive to light.   Neck: Trachea normal and phonation normal.   Cardiovascular:  Normal rate, regular rhythm, normal heart sounds, intact distal pulses and normal pulses.     Exam reveals no gallop, no S3, no S4 and no friction rub.       No murmur heard.  Hepatic jugular reflex   Pulmonary/Chest: Breath sounds normal. No respiratory distress. He has no wheezes. He has no rhonchi. He has no rales.   Abdominal: Abdomen is soft. He exhibits no distension. There is no abdominal tenderness.   Musculoskeletal:      Right lower leg: Edema present.      Left lower leg: Edema present.     Neurological: He is alert.   Skin: Skin is warm, dry and intact. Capillary refill takes less than 2 seconds.   Psychiatric: He has a normal mood and affect. His speech is normal.         ED Course   Procedures  Labs Reviewed   CBC W/ AUTO DIFFERENTIAL - Abnormal; Notable for the following components:       Result Value    RBC 2.65 (*)     Hemoglobin 7.3 (*)     Hematocrit 22.4 (*)     Lymph # 0.7 (*)     Lymph % 9.6 (*)     All other components within normal limits   COMPREHENSIVE METABOLIC PANEL - Abnormal; Notable for the following components:    BUN 91 (*)     Creatinine 11.3 (*)     Calcium 8.3 (*)     Albumin 3.3 (*)     eGFR 4.9  (*)     All other components within normal limits   TROPONIN I - Abnormal; Notable for the following components:    Troponin I 0.093 (*)     All other components within normal limits   B-TYPE NATRIURETIC PEPTIDE - Abnormal; Notable for the following components:     (*)     All other components within normal limits   PHOSPHORUS - Abnormal; Notable for the following components:    Phosphorus 5.7 (*)     All other components within normal limits    Narrative:     ADD ON MAGNESIUM AND PHOS PER ABRAHAM MONTANANP/ORDER# [id number] AND   [id number] @ 19:34   ISTAT PROCEDURE - Abnormal; Notable for the following components:    POC PH 7.469 (*)     POC PO2 81 (*)     POC BE 4 (*)     All other components within normal limits   PHOSPHORUS   MAGNESIUM   MAGNESIUM    Narrative:     ADD ON MAGNESIUM AND PHOS PER ABRAHAM MONTANA,NP/ORDER# [id number] AND   [id number] @ 19:34   SARS-COV-2 RNA AMPLIFICATION, QUAL     EKG Readings: (Independently Interpreted)   Initial Reading: No STEMI. Previous EKG: Compared with most recent EKG Rhythm: Normal Sinus Rhythm. Heart Rate: 69. Ectopy: No Ectopy. Conduction: Normal. ST Segments: Normal ST Segments. T Waves Flipped: AVR, V1 and III. Axis: Normal. Clinical Impression: Normal Sinus Rhythm       Imaging Results              X-Ray Chest AP Portable (Final result)  Result time 11/09/23 18:42:03      Final result by Ravi Ta MD (11/09/23 18:42:03)                   Impression:      CHF/volume overload.      Electronically signed by: Ravi Ta  Date:    11/09/2023  Time:    18:42               Narrative:    EXAMINATION:  XR CHEST AP PORTABLE    CLINICAL HISTORY:  CHF;    TECHNIQUE:  Single frontal view of the chest was performed.    COMPARISON:  10/30/2023    FINDINGS:  Enlarged cardiac silhouette with pulmonary vascular congestion.  Diffuse bilateral interstitial edema.  No focal consolidation.  No obvious pleural effusion.                                        Medications   amLODIPine tablet 10 mg (10 mg Oral Given 11/10/23 1039)   calcium acetate(phosphat bind) capsule 1,334 mg (1,334 mg Oral Not Given 11/10/23 0745)   sodium bicarbonate tablet 1,300 mg (1,300 mg Oral Given 11/10/23 1039)   sodium zirconium cyclosilicate packet 10 g (has no administration in time range)   carvediloL tablet 25 mg (25 mg Oral Given 11/10/23 1041)   hydrALAZINE tablet 50 mg (50 mg Oral Given 11/10/23 0558)   sodium chloride 0.9% flush 10 mL (has no administration in time range)   naloxone 0.4 mg/mL injection 0.02 mg (has no administration in time range)   glucose chewable tablet 16 g (has no administration in time range)   glucose chewable tablet 24 g (has no administration in time range)   glucagon (human recombinant) injection 1 mg (has no administration in time range)   heparin (porcine) injection 5,000 Units (5,000 Units Subcutaneous Not Given 11/10/23 0600)   acetaminophen tablet 650 mg (has no administration in time range)   acetaminophen tablet 1,000 mg (has no administration in time range)   ondansetron injection 4 mg (has no administration in time range)   melatonin tablet 6 mg (has no administration in time range)   dextrose 10% bolus 125 mL 125 mL (has no administration in time range)   dextrose 10% bolus 250 mL 250 mL (has no administration in time range)   furosemide injection 80 mg (80 mg Intravenous Given 11/9/23 1904)   furosemide injection 80 mg (80 mg Intravenous Given 11/10/23 0558)     Medical Decision Making  Patient has a history of diabetes, CKD with dialysis port placement tomorrow, presenting for shortness of breath.  Initially, patient is hypertensive but overall hemodynamically stable.      Due to patient's history of CKD, bilateral lower extremity edema, dyspnea on exertion, most consistent with fluid overload.  Will treat with 80 mg of Lasix.  Low suspicion for pneumonia since patient has no infectious symptoms.  Lower suspicion for pneumothorax will obtain  chest x-ray.      Chest x-ray, BNP, troponin consistent with fluid overload, continue IV diuresis.  Discussed with Hospital Medicine who agreed to admit patient to their service for further management, patient hemodynamically stable and well-appearing at time of transfer of care.    Amount and/or Complexity of Data Reviewed  Labs: ordered. Decision-making details documented in ED Course.  Radiology: ordered. Decision-making details documented in ED Course.    Risk  Prescription drug management.              Attending Attestation:   Physician Attestation Statement for Resident:  As the supervising MD   Physician Attestation Statement: I have personally seen and examined this patient.   I agree with the above history.  -: Dyspnea on exertion secondary to hypervolemia.  Will attempt diuresis with Lasix.  Will admit.   As the supervising MD I agree with the above PE.     As the supervising MD I agree with the above treatment, course, plan, and disposition.                    ED Course as of 11/10/23 1057   Thu Nov 09, 2023   1603 No STEMI [AC]   1849 Troponin I(!): 0.093 [ES]   1849 Hemoglobin(!): 7.3  Normocytic anemia consistent with baseline. [ES]   1907 BNP(!): 530 [ES]   1908 BUN(!): 91 [ES]   1908 Creatinine(!): 11.3 [ES]   1908 X-Ray Chest AP Portable  Independent interpretation: Consistent with pulmonary edema.  Will treat with Lasix. [ES]      ED Course User Index  [AC] Jp Rice DO  [ES] Surekha Dahl MD                    Clinical Impression:   Final diagnoses:  [R06.02] Shortness of breath  [E87.70] Fluid overload        ED Disposition Condition    Observation Stable                Surekha Dahl MD  Resident  11/10/23 0102       Elias Smith MD  11/10/23 1058

## 2023-11-10 NOTE — ASSESSMENT & PLAN NOTE
-Phos 5.7 on labs, much improved from previous admission.  -Continue phosphorus binders.  -Renal diet.

## 2023-11-10 NOTE — PROGRESS NOTES
Nurses Note -- 4 Eyes      11/9/2023   11:25 PM      Skin assessed during: Admit      [x] No Altered Skin Integrity Present    []Prevention Measures Documented      [] Yes- Altered Skin Integrity Present or Discovered   [] LDA Added if Not in Epic (Describe Wound)   [] New Altered Skin Integrity was Present on Admit and Documented in LDA   [] Wound Image Taken    Wound Care Consulted? No    Attending Nurse:  Delia Holm RN/Staff Member:  MUKUL Vargas RN

## 2023-11-10 NOTE — ASSESSMENT & PLAN NOTE
-See fluid overload.  -Creatine stable for now. BMP reviewed- noted Estimated Creatinine Clearance: 9.1 mL/min (A) (based on SCr of 11.3 mg/dL (H)). according to latest data. Based on current GFR, CKD stage is stage 5 - GFR < 15.   -Monitor UOP and serial BMP and adjust therapy as needed.   -Renally dose meds. Avoid nephrotoxic medications and procedures.

## 2023-11-10 NOTE — CONSULTS
"[facility] - [facility] Step[facility]  Nephrology  Consult Note    Patient Name: Wai Bain  MRN: [id number]  Admission Date: 11/9/2023  Hospital Length of Stay: 0 days  Attending Provider: vIan Hall MD   Primary Care Physician: Abhi Peralta MD  Principal Problem:Fluid overload    Inpatient consult to Nephrology  Consult performed by: Masoud Isaac MD  Consult ordered by: Michelle Lainez NP  Reason for consult: claudine on ckd 5        Subjective:     HPI: HPI: Wai Bain is a 53 y.o. male with a PMHx of DM2, HTN, CKD5, and anemia who presents to the ED with complaints of shortness of breath. Per pt and his wife he is followed by nephrology outpatient. He has an appointment tomorrow for HD tunneled line placement. His wife reports he became acutely short of breath today noting he has had intermittent CAT for several months. Pt endorses associated BLE edema, fatigue, abdominal distention, non productive cough, 15lb weight gain x1 month, nausea, and decreased UO. He notes intermittent lightheadedness/ head "fullness" with exertion. The patient's wife reports he was hospitalized about 3 weeks ago at [facility] for anemia and elevated blood pressure. She states his blood pressure have been much better controlled since the addition of several antihypertensives. The patient denies fever, chills, CP, abdominal pain, vomiting, diarrhea, or dysuria.     In the ED, VSS, afebrile. CBC with stable anemia. Cr 11.3 (consistent with CKD5). Calcium 8.3. Albumin 3.3. . Troponin 0.093. EKG with SR, 67 bpm, no ST elevation or depression. CXR reveals pulmonary edema. The patient received 80mg IV lasix      Past Medical History:   Diagnosis Date    CKD (chronic kidney disease), stage V 10/21/2023    Diabetes mellitus     Diabetes mellitus, type 2     H/O esophagogastroduodenoscopy     Hypertension     Urinary tract infection     History       Past Surgical History:   Procedure Laterality Date    " "ESOPHAGOGASTRODUODENOSCOPY N/A 03/13/2020    Procedure: EGD (ESOPHAGOGASTRODUODENOSCOPY);  Surgeon: Katiuska العراقي MD;  Location: [facility] ENDO;  Service: Endoscopy;  Laterality: N/A;    LAPAROSCOPIC CHOLECYSTECTOMY N/A 06/22/2020    Procedure: CHOLECYSTECTOMY, LAPAROSCOPIC;  Surgeon: Dayron Conner MD;  Location: [facility] OR;  Service: General;  Laterality: N/A;       Review of patient's allergies indicates:  No Known Allergies  Current Facility-Administered Medications   Medication Frequency    acetaminophen tablet 1,000 mg Q8H PRN    acetaminophen tablet 650 mg Q6H PRN    amLODIPine tablet 10 mg Daily    calcium acetate(phosphat bind) capsule 1,334 mg TID WM    carvediloL tablet 25 mg BID    dextrose 10% bolus 125 mL 125 mL PRN    dextrose 10% bolus 250 mL 250 mL PRN    [START ON 11/11/2023] furosemide injection 80 mg Daily    glucagon (human recombinant) injection 1 mg PRN    glucose chewable tablet 16 g PRN    glucose chewable tablet 24 g PRN    heparin (porcine) injection 5,000 Units Q8H    hydrALAZINE tablet 50 mg Q8H    melatonin tablet 6 mg Nightly PRN    naloxone 0.4 mg/mL injection 0.02 mg PRN    ondansetron injection 4 mg Q8H PRN    sodium bicarbonate tablet 1,300 mg TID    sodium chloride 0.9% flush 10 mL Q12H PRN    sodium chloride 0.9% flush 10 mL PRN    sodium zirconium cyclosilicate packet 10 g Daily     Family History    None       Tobacco Use    Smoking status: Former     Types: Cigarettes    Smokeless tobacco: Never    Tobacco comments:     quit "long time ago"   Substance and Sexual Activity    Alcohol use: Yes     Alcohol/week: 1.0 standard drink of alcohol     Types: 1 Cans of beer per week     Comment: social    Drug use: Never    Sexual activity: Yes     Partners: Female     Review of Systems  Objective:     Vital Signs (Most Recent):  Temp: 97.9 °F (36.6 °C) (11/10/23 1544)  Pulse: 66 (11/10/23 1544)  Resp: 18 (11/10/23 1544)  BP: (!) 142/68 (11/10/23 1544)  SpO2: " "(!) 94 % (11/10/23 1544) Vital Signs (24h Range):  Temp:  [97.8 °F (36.6 °C)-98.2 °F (36.8 °C)] 97.9 °F (36.6 °C)  Pulse:  [62-70] 66  Resp:  [14-20] 18  SpO2:  [94 %-98 %] 94 %  BP: (141-167)/(67-76) 142/68     Weight: 113.4 kg (250 lb) (11/10/23 0752)  Body mass index is 39.15 kg/m².  Body surface area is 2.32 meters squared.    I/O last 3 completed shifts:  In: -   Out: 1175 [Urine:1175]     Physical Exam  HENT:      Head: Normocephalic.      Mouth/Throat:      Mouth: Mucous membranes are moist.   Cardiovascular:      Rate and Rhythm: Normal rate.      Pulses: Normal pulses.   Pulmonary:      Effort: Pulmonary effort is normal.   Musculoskeletal:      Cervical back: Normal range of motion.      Right lower leg: Edema present.      Left lower leg: Edema present.   Skin:     General: Skin is warm.   Neurological:      General: No focal deficit present.      Mental Status: He is alert.   Psychiatric:         Mood and Affect: Mood normal.          Significant Labs:  ABGs:   Recent Labs   Lab 11/09/23  2136   PH 7.469*   PCO2 37.7   HCO3 27.4   POCSATURATED 97   BE 4*     BMP:   Recent Labs   Lab 11/10/23  0655   *      K 4.1      CO2 26   BUN 92*   CREATININE 11.5*   CALCIUM 8.2*   MG 2.3     Cardiac Markers: No results for input(s): "CKMB", "TROPONINT", "MYOGLOBIN" in the last 168 hours.  CBC:   Recent Labs   Lab 11/10/23  0655 11/10/23  1041   WBC 7.17  --    RBC 2.48*  --    HGB 6.7* 7.4*   HCT 20.9*  --    *  --    MCV 84  --    MCH 27.0  --    MCHC 32.1  --      CMP:   Recent Labs   Lab 11/10/23  0655   *   CALCIUM 8.2*   ALBUMIN 3.0*   PROT 5.9*      K 4.1   CO2 26      BUN 92*   CREATININE 11.5*   ALKPHOS 55   ALT 21   AST 15   BILITOT 0.3     Coagulation:   Recent Labs   Lab 11/10/23  0655   INR 1.2     LFTs:   Recent Labs   Lab 11/10/23  0655   ALT 21   AST 15   ALKPHOS 55   BILITOT 0.3   PROT 5.9*   ALBUMIN 3.0*     Microbiology Results (last 7 days)       ** No " results found for the last 168 hours. **          Specimen (24h ago, onward)      None              Assessment/Plan:     Cardiac/Vascular  Essential hypertension  Per primary    Renal/  CKD (chronic kidney disease), stage V  Baseline GFR 4  Suspect etiology to be chronic uncontrolled DM    Uop since last night 475 after 80mg lasix  We have lab from oct 20 scr 10.49 trending up to 11.3-11.5 today   Plan  Responding to lasix, please give lasix 80 mg today if he look volume up can increase the dose of lasix and monitor urine input output , will plan for dialysis accordingly  Obtain chest Xray  -Referred by outpatient Dr for CKD education, KTxp evaluation, AVF placement (not placed )  -admit to [facility] for HD initiation  -on sodium bicarb 1300mg, lokelma 10g ,   -Tunneled plan on Monday  - avoid nephrotoxic    NESTOR  See CKD      Endocrine  Type 2 diabetes mellitus with hyperglycemia, without long-term current use of insulin  Per primary        Thank you for your consult.     Masoud Isaac MD  Nephrology  [facility]

## 2023-11-10 NOTE — SUBJECTIVE & OBJECTIVE
Interval History:   Patient reports that he feels better after receiving IV lasix overnight and had good UOP. No other complaints. Interventional nephrology consulted for line placement.      Review of Systems   Constitutional:  Negative for activity change, appetite change, chills, diaphoresis, fatigue and fever.   HENT:  Negative for rhinorrhea and sore throat.    Respiratory:  Negative for cough, chest tightness and shortness of breath.    Cardiovascular:  Negative for chest pain and palpitations.   Gastrointestinal:  Negative for abdominal pain, constipation, diarrhea and nausea.   Endocrine: Negative for cold intolerance.   Genitourinary:  Negative for decreased urine volume and dysuria.   Musculoskeletal:  Negative for arthralgias and myalgias.   Skin:  Negative for rash and wound.   Neurological:  Negative for dizziness, weakness, numbness and headaches.   Psychiatric/Behavioral:  Negative for agitation, behavioral problems and confusion.      Objective:     Vital Signs (Most Recent):  Temp: 98.2 °F (36.8 °C) (11/10/23 1127)  Pulse: 67 (11/10/23 1127)  Resp: 20 (11/10/23 1127)  BP: (!) 160/73 (11/10/23 1127)  SpO2: 98 % (11/10/23 1127) Vital Signs (24h Range):  Temp:  [97.8 °F (36.6 °C)-98.2 °F (36.8 °C)] 98.2 °F (36.8 °C)  Pulse:  [62-70] 67  Resp:  [14-20] 20  SpO2:  [94 %-98 %] 98 %  BP: (141-160)/(67-74) 160/73     Weight: 113.4 kg (250 lb)  Body mass index is 39.15 kg/m².    Intake/Output Summary (Last 24 hours) at 11/10/2023 1341  Last data filed at 11/10/2023 0608  Gross per 24 hour   Intake --   Output 1175 ml   Net -1175 ml         Physical Exam  Vitals and nursing note reviewed.   Constitutional:       General: He is sleeping. He is not in acute distress.     Appearance: He is obese. He is not toxic-appearing or diaphoretic.   HENT:      Head: Normocephalic and atraumatic.      Nose: Nose normal.      Mouth/Throat:      Mouth: Mucous membranes are moist.   Eyes:      Extraocular Movements:  Extraocular movements intact.   Cardiovascular:      Rate and Rhythm: Normal rate and regular rhythm.      Pulses: Normal pulses.      Heart sounds: No murmur heard.  Pulmonary:      Effort: Pulmonary effort is normal. No respiratory distress.      Breath sounds: Rales present. No wheezing or rhonchi.   Abdominal:      General: Bowel sounds are normal. There is distension (mild).      Palpations: Abdomen is soft.      Tenderness: There is no abdominal tenderness. There is no guarding.   Musculoskeletal:         General: No tenderness or deformity. Normal range of motion.      Cervical back: Normal range of motion.      Right lower leg: Edema present.      Left lower leg: Edema present.   Skin:     General: Skin is warm and dry.      Capillary Refill: Capillary refill takes less than 2 seconds.   Neurological:      General: No focal deficit present.      Mental Status: He is oriented to person, place, and time and easily aroused.      Sensory: No sensory deficit.      Motor: No weakness.   Psychiatric:         Mood and Affect: Mood normal.         Behavior: Behavior normal.             Significant Labs: All pertinent labs within the past 24 hours have been reviewed.  CBC:   Recent Labs   Lab 11/09/23  1735 11/10/23  0655 11/10/23  1041   WBC 7.57 7.17  --    HGB 7.3* 6.7* 7.4*   HCT 22.4* 20.9*  --     147*  --      CMP:   Recent Labs   Lab 11/09/23  1735 11/10/23  0655    142   K 4.5 4.1    103   CO2 25 26   GLU 99 111*   BUN 91* 92*   CREATININE 11.3* 11.5*   CALCIUM 8.3* 8.2*   PROT 6.6 5.9*   ALBUMIN 3.3* 3.0*   BILITOT 0.4 0.3   ALKPHOS 64 55   AST 23 15   ALT 27 21   ANIONGAP 15 13       Significant Imaging: I have reviewed all pertinent imaging results/findings within the past 24 hours.

## 2023-11-10 NOTE — SUBJECTIVE & OBJECTIVE
"Past Medical History:   Diagnosis Date    CKD (chronic kidney disease), stage V 10/21/2023    Diabetes mellitus     Diabetes mellitus, type 2     H/O esophagogastroduodenoscopy     Hypertension     Urinary tract infection     History       Past Surgical History:   Procedure Laterality Date    ESOPHAGOGASTRODUODENOSCOPY N/A 03/13/2020    Procedure: EGD (ESOPHAGOGASTRODUODENOSCOPY);  Surgeon: Katiuska العراقي MD;  Location: [facility] ENDO;  Service: Endoscopy;  Laterality: N/A;    LAPAROSCOPIC CHOLECYSTECTOMY N/A 06/22/2020    Procedure: CHOLECYSTECTOMY, LAPAROSCOPIC;  Surgeon: Dayron Conner MD;  Location: [facility] OR;  Service: General;  Laterality: N/A;       Review of patient's allergies indicates:  No Known Allergies  Current Facility-Administered Medications   Medication Frequency    acetaminophen tablet 1,000 mg Q8H PRN    acetaminophen tablet 650 mg Q6H PRN    amLODIPine tablet 10 mg Daily    calcium acetate(phosphat bind) capsule 1,334 mg TID WM    carvediloL tablet 25 mg BID    dextrose 10% bolus 125 mL 125 mL PRN    dextrose 10% bolus 250 mL 250 mL PRN    [START ON 11/11/2023] furosemide injection 80 mg Daily    glucagon (human recombinant) injection 1 mg PRN    glucose chewable tablet 16 g PRN    glucose chewable tablet 24 g PRN    heparin (porcine) injection 5,000 Units Q8H    hydrALAZINE tablet 50 mg Q8H    melatonin tablet 6 mg Nightly PRN    naloxone 0.4 mg/mL injection 0.02 mg PRN    ondansetron injection 4 mg Q8H PRN    sodium bicarbonate tablet 1,300 mg TID    sodium chloride 0.9% flush 10 mL Q12H PRN    sodium chloride 0.9% flush 10 mL PRN    sodium zirconium cyclosilicate packet 10 g Daily     Family History    None       Tobacco Use    Smoking status: Former     Types: Cigarettes    Smokeless tobacco: Never    Tobacco comments:     quit "long time ago"   Substance and Sexual Activity    Alcohol use: Yes     Alcohol/week: 1.0 standard drink of alcohol     Types: 1 Cans of beer per week     Comment: " "social    Drug use: Never    Sexual activity: Yes     Partners: Female     Review of Systems  Objective:     Vital Signs (Most Recent):  Temp: 97.9 °F (36.6 °C) (11/10/23 1544)  Pulse: 66 (11/10/23 1544)  Resp: 18 (11/10/23 1544)  BP: (!) 142/68 (11/10/23 1544)  SpO2: (!) 94 % (11/10/23 1544) Vital Signs (24h Range):  Temp:  [97.8 °F (36.6 °C)-98.2 °F (36.8 °C)] 97.9 °F (36.6 °C)  Pulse:  [62-70] 66  Resp:  [14-20] 18  SpO2:  [94 %-98 %] 94 %  BP: (141-167)/(67-76) 142/68     Weight: 113.4 kg (250 lb) (11/10/23 0752)  Body mass index is 39.15 kg/m².  Body surface area is 2.32 meters squared.    I/O last 3 completed shifts:  In: -   Out: 1175 [Urine:1175]     Physical Exam  HENT:      Head: Normocephalic.      Mouth/Throat:      Mouth: Mucous membranes are moist.   Cardiovascular:      Rate and Rhythm: Normal rate.      Pulses: Normal pulses.   Pulmonary:      Effort: Pulmonary effort is normal.   Musculoskeletal:      Cervical back: Normal range of motion.      Right lower leg: Edema present.      Left lower leg: Edema present.   Skin:     General: Skin is warm.   Neurological:      General: No focal deficit present.      Mental Status: He is alert.   Psychiatric:         Mood and Affect: Mood normal.          Significant Labs:  ABGs:   Recent Labs   Lab 11/09/23 2136   PH 7.469*   PCO2 37.7   HCO3 27.4   POCSATURATED 97   BE 4*     BMP:   Recent Labs   Lab 11/10/23  0655   *      K 4.1      CO2 26   BUN 92*   CREATININE 11.5*   CALCIUM 8.2*   MG 2.3     Cardiac Markers: No results for input(s): "CKMB", "TROPONINT", "MYOGLOBIN" in the last 168 hours.  CBC:   Recent Labs   Lab 11/10/23  0655 11/10/23  1041   WBC 7.17  --    RBC 2.48*  --    HGB 6.7* 7.4*   HCT 20.9*  --    *  --    MCV 84  --    MCH 27.0  --    MCHC 32.1  --      CMP:   Recent Labs   Lab 11/10/23  0655   *   CALCIUM 8.2*   ALBUMIN 3.0*   PROT 5.9*      K 4.1   CO2 26      BUN 92*   CREATININE 11.5* "   ALKPHOS 55   ALT 21   AST 15   BILITOT 0.3     Coagulation:   Recent Labs   Lab 11/10/23  0655   INR 1.2     LFTs:   Recent Labs   Lab 11/10/23  0655   ALT 21   AST 15   ALKPHOS 55   BILITOT 0.3   PROT 5.9*   ALBUMIN 3.0*     Microbiology Results (last 7 days)       ** No results found for the last 168 hours. **          Specimen (24h ago, onward)      None

## 2023-11-10 NOTE — HPI
"HPI: Wai Bain is a 53 y.o. male with a PMHx of DM2, HTN, CKD5, and anemia who presents to the ED with complaints of shortness of breath. Per pt and his wife he is followed by nephrology outpatient. He has an appointment tomorrow for HD tunneled line placement. His wife reports he became acutely short of breath today noting he has had intermittent CAT for several months. Pt endorses associated BLE edema, fatigue, abdominal distention, non productive cough, 15lb weight gain x1 month, nausea, and decreased UO. He notes intermittent lightheadedness/ head "fullness" with exertion. The patient's wife reports he was hospitalized about 3 weeks ago at [facility] for anemia and elevated blood pressure. She states his blood pressure have been much better controlled since the addition of several antihypertensives. The patient denies fever, chills, CP, abdominal pain, vomiting, diarrhea, or dysuria.     In the ED, VSS, afebrile. CBC with stable anemia. Cr 11.3 (consistent with CKD5). Calcium 8.3. Albumin 3.3. . Troponin 0.093. EKG with SR, 67 bpm, no ST elevation or depression. CXR reveals pulmonary edema. The patient received 80mg IV lasix  "

## 2023-11-10 NOTE — ASSESSMENT & PLAN NOTE
Patient's anemia is currently controlled. Has not received any PRBCs to date. Etiology likely d/t chronic disease due to Chronic Kidney Disease/ESRD  Current CBC reviewed-   Lab Results   Component Value Date    HGB 7.3 (L) 11/09/2023    HCT 22.4 (L) 11/09/2023     Monitor serial CBC and transfuse if patient becomes hemodynamically unstable, symptomatic or H/H drops below 7/21.

## 2023-11-10 NOTE — PLAN OF CARE
Plan of care reviewed on am rounds and updated throughout shift. Afrebrile, sbp 150-160's on current Rx management. FBS ( lab) nl. Repeat hgb 7.4/ has current T and S. K 4.1 Cr 11.5 Voiding OK. Plan for dialysis cath placement on Monday, renal diet resumed.Up ambulatory with steady gait, attentive/ supportive wife at bs, emotional support provided. Wife speaks/understands English/  Ipad utilized for blood consent, patient primary Kiswahili speaking

## 2023-11-10 NOTE — ASSESSMENT & PLAN NOTE
Chronic, controlled. Latest blood pressure and vitals reviewed-     Temp:  [98.2 °F (36.8 °C)]   Pulse:  [64-68]   Resp:  [14-20]   BP: (151-158)/(69-74)   SpO2:  [96 %-98 %] .   Home meds for hypertension were reviewed and noted below.   Hypertension Medications             amLODIPine (NORVASC) 10 MG tablet Take 1 tablet (10 mg total) by mouth once daily.    carvediloL (COREG) 25 MG tablet Take 1 tablet (25 mg total) by mouth 2 (two) times daily.    hydrALAZINE (APRESOLINE) 50 MG tablet Take 1 tablet (50 mg total) by mouth every 8 (eight) hours.          While in the hospital, will manage blood pressure as follows; Continue home antihypertensive regimen    Will utilize p.r.n. blood pressure medication only if patient's blood pressure greater than 180/110 and he develops symptoms such as worsening chest pain or shortness of breath.

## 2023-11-10 NOTE — ASSESSMENT & PLAN NOTE
Per pt and his wife he is followed by nephrology outpatient. He has an appointment tomorrow for HD tunneled line placement. His wife reports he became acutely short of breath today noting he has had intermittent CAT for several months. Pt endorses associated BLE edema, abdominal distention, cough, 15lb weight gain x1 month, nausea, and decreased UO.    -   - Trop peaked at 0.093 likely due to volume OL, patient denied CP on admit  - CXR with pulmonary edema.  - Pt received 80mg IV lasix in the ED and repeat on arrival to the floor  - TTE 11/10: EF 60%, CVp 8, PASP 47, mild MR, TR  - Nephrology consulted  -- IV Lasix 80mg daily   - Fluid restriction  - Strict I&Os, daily weight.

## 2023-11-11 LAB
ALBUMIN SERPL BCP-MCNC: 3 G/DL (ref 3.5–5.2)
ALP SERPL-CCNC: 53 U/L (ref 55–135)
ALT SERPL W/O P-5'-P-CCNC: 17 U/L (ref 10–44)
ANION GAP SERPL CALC-SCNC: 15 MMOL/L (ref 8–16)
AST SERPL-CCNC: 13 U/L (ref 10–40)
BASOPHILS # BLD AUTO: 0.01 K/UL (ref 0–0.2)
BASOPHILS NFR BLD: 0.1 % (ref 0–1.9)
BILIRUB SERPL-MCNC: 0.4 MG/DL (ref 0.1–1)
BLD PROD TYP BPU: NORMAL
BLOOD UNIT EXPIRATION DATE: NORMAL
BLOOD UNIT TYPE CODE: 5100
BLOOD UNIT TYPE: NORMAL
BUN SERPL-MCNC: 90 MG/DL (ref 6–20)
CALCIUM SERPL-MCNC: 8.4 MG/DL (ref 8.7–10.5)
CHLORIDE SERPL-SCNC: 104 MMOL/L (ref 95–110)
CO2 SERPL-SCNC: 25 MMOL/L (ref 23–29)
CODING SYSTEM: NORMAL
CREAT SERPL-MCNC: 11.8 MG/DL (ref 0.5–1.4)
CROSSMATCH INTERPRETATION: NORMAL
DIFFERENTIAL METHOD: ABNORMAL
DISPENSE STATUS: NORMAL
EOSINOPHIL # BLD AUTO: 0.4 K/UL (ref 0–0.5)
EOSINOPHIL NFR BLD: 5.3 % (ref 0–8)
ERYTHROCYTE [DISTWIDTH] IN BLOOD BY AUTOMATED COUNT: 12.1 % (ref 11.5–14.5)
EST. GFR  (NO RACE VARIABLE): 4.7 ML/MIN/1.73 M^2
GLUCOSE SERPL-MCNC: 102 MG/DL (ref 70–110)
HCT VFR BLD AUTO: 21.4 % (ref 40–54)
HGB BLD-MCNC: 6.8 G/DL (ref 14–18)
IMM GRANULOCYTES # BLD AUTO: 0.02 K/UL (ref 0–0.04)
IMM GRANULOCYTES NFR BLD AUTO: 0.3 % (ref 0–0.5)
LYMPHOCYTES # BLD AUTO: 0.8 K/UL (ref 1–4.8)
LYMPHOCYTES NFR BLD: 11 % (ref 18–48)
MAGNESIUM SERPL-MCNC: 2.3 MG/DL (ref 1.6–2.6)
MCH RBC QN AUTO: 26.9 PG (ref 27–31)
MCHC RBC AUTO-ENTMCNC: 31.8 G/DL (ref 32–36)
MCV RBC AUTO: 85 FL (ref 82–98)
MONOCYTES # BLD AUTO: 0.7 K/UL (ref 0.3–1)
MONOCYTES NFR BLD: 10.2 % (ref 4–15)
NEUTROPHILS # BLD AUTO: 5.3 K/UL (ref 1.8–7.7)
NEUTROPHILS NFR BLD: 73.1 % (ref 38–73)
NRBC BLD-RTO: 0 /100 WBC
NUM UNITS TRANS PACKED RBC: NORMAL
PHOSPHATE SERPL-MCNC: 6 MG/DL (ref 2.7–4.5)
PLATELET # BLD AUTO: 153 K/UL (ref 150–450)
PMV BLD AUTO: 10 FL (ref 9.2–12.9)
POTASSIUM SERPL-SCNC: 4 MMOL/L (ref 3.5–5.1)
PROT SERPL-MCNC: 6 G/DL (ref 6–8.4)
RBC # BLD AUTO: 2.53 M/UL (ref 4.6–6.2)
SODIUM SERPL-SCNC: 144 MMOL/L (ref 136–145)
WBC # BLD AUTO: 7.29 K/UL (ref 3.9–12.7)

## 2023-11-11 PROCEDURE — 80053 COMPREHEN METABOLIC PANEL: CPT | Mod: NTX | Performed by: STUDENT IN AN ORGANIZED HEALTH CARE EDUCATION/TRAINING PROGRAM

## 2023-11-11 PROCEDURE — 63600175 PHARM REV CODE 636 W HCPCS: Mod: NTX | Performed by: STUDENT IN AN ORGANIZED HEALTH CARE EDUCATION/TRAINING PROGRAM

## 2023-11-11 PROCEDURE — 20600001 HC STEP DOWN PRIVATE ROOM: Mod: NTX

## 2023-11-11 PROCEDURE — 25000003 PHARM REV CODE 250: Mod: NTX | Performed by: NURSE PRACTITIONER

## 2023-11-11 PROCEDURE — P9016 RBC LEUKOCYTES REDUCED: HCPCS | Mod: NTX | Performed by: STUDENT IN AN ORGANIZED HEALTH CARE EDUCATION/TRAINING PROGRAM

## 2023-11-11 PROCEDURE — 83735 ASSAY OF MAGNESIUM: CPT | Mod: NTX | Performed by: STUDENT IN AN ORGANIZED HEALTH CARE EDUCATION/TRAINING PROGRAM

## 2023-11-11 PROCEDURE — 25000003 PHARM REV CODE 250: Mod: NTX | Performed by: STUDENT IN AN ORGANIZED HEALTH CARE EDUCATION/TRAINING PROGRAM

## 2023-11-11 PROCEDURE — 86920 COMPATIBILITY TEST SPIN: CPT | Mod: NTX | Performed by: STUDENT IN AN ORGANIZED HEALTH CARE EDUCATION/TRAINING PROGRAM

## 2023-11-11 PROCEDURE — 36430 TRANSFUSION BLD/BLD COMPNT: CPT

## 2023-11-11 PROCEDURE — 84100 ASSAY OF PHOSPHORUS: CPT | Mod: NTX | Performed by: STUDENT IN AN ORGANIZED HEALTH CARE EDUCATION/TRAINING PROGRAM

## 2023-11-11 PROCEDURE — 85025 COMPLETE CBC W/AUTO DIFF WBC: CPT | Mod: NTX | Performed by: STUDENT IN AN ORGANIZED HEALTH CARE EDUCATION/TRAINING PROGRAM

## 2023-11-11 PROCEDURE — 36415 COLL VENOUS BLD VENIPUNCTURE: CPT | Mod: NTX | Performed by: STUDENT IN AN ORGANIZED HEALTH CARE EDUCATION/TRAINING PROGRAM

## 2023-11-11 RX ORDER — HYDRALAZINE HYDROCHLORIDE 25 MG/1
75 TABLET, FILM COATED ORAL EVERY 8 HOURS
Status: DISCONTINUED | OUTPATIENT
Start: 2023-11-11 | End: 2023-11-15 | Stop reason: HOSPADM

## 2023-11-11 RX ORDER — POLYETHYLENE GLYCOL 3350 17 G/17G
17 POWDER, FOR SOLUTION ORAL 2 TIMES DAILY PRN
Status: DISCONTINUED | OUTPATIENT
Start: 2023-11-11 | End: 2023-11-15 | Stop reason: HOSPADM

## 2023-11-11 RX ORDER — SENNOSIDES 8.6 MG/1
8.6 TABLET ORAL DAILY PRN
Status: DISCONTINUED | OUTPATIENT
Start: 2023-11-11 | End: 2023-11-15 | Stop reason: HOSPADM

## 2023-11-11 RX ORDER — BISACODYL 10 MG
10 SUPPOSITORY, RECTAL RECTAL DAILY PRN
Status: DISCONTINUED | OUTPATIENT
Start: 2023-11-11 | End: 2023-11-15 | Stop reason: HOSPADM

## 2023-11-11 RX ORDER — HYDRALAZINE HYDROCHLORIDE 25 MG/1
25 TABLET, FILM COATED ORAL EVERY 8 HOURS PRN
Status: DISCONTINUED | OUTPATIENT
Start: 2023-11-11 | End: 2023-11-15 | Stop reason: HOSPADM

## 2023-11-11 RX ORDER — HYDROCODONE BITARTRATE AND ACETAMINOPHEN 500; 5 MG/1; MG/1
TABLET ORAL
Status: DISCONTINUED | OUTPATIENT
Start: 2023-11-11 | End: 2023-11-15 | Stop reason: HOSPADM

## 2023-11-11 RX ADMIN — SODIUM ZIRCONIUM CYCLOSILICATE 10 G: 5 POWDER, FOR SUSPENSION ORAL at 01:11

## 2023-11-11 RX ADMIN — FUROSEMIDE 80 MG: 10 INJECTION, SOLUTION INTRAVENOUS at 09:11

## 2023-11-11 RX ADMIN — CALCIUM ACETATE 1334 MG: 667 CAPSULE ORAL at 09:11

## 2023-11-11 RX ADMIN — CARVEDILOL 25 MG: 12.5 TABLET, FILM COATED ORAL at 09:11

## 2023-11-11 RX ADMIN — HYDRALAZINE HYDROCHLORIDE 75 MG: 50 TABLET ORAL at 02:11

## 2023-11-11 RX ADMIN — SODIUM BICARBONATE 650 MG TABLET 1300 MG: at 09:11

## 2023-11-11 RX ADMIN — SENNOSIDES 8.6 MG: 8.6 TABLET, FILM COATED ORAL at 09:11

## 2023-11-11 RX ADMIN — SODIUM BICARBONATE 650 MG TABLET 1300 MG: at 02:11

## 2023-11-11 RX ADMIN — CALCIUM ACETATE 1334 MG: 667 CAPSULE ORAL at 05:11

## 2023-11-11 RX ADMIN — AMLODIPINE BESYLATE 10 MG: 10 TABLET ORAL at 09:11

## 2023-11-11 RX ADMIN — SODIUM BICARBONATE 650 MG TABLET 1300 MG: at 08:11

## 2023-11-11 RX ADMIN — HYDRALAZINE HYDROCHLORIDE 75 MG: 50 TABLET ORAL at 08:11

## 2023-11-11 RX ADMIN — CARVEDILOL 25 MG: 12.5 TABLET, FILM COATED ORAL at 08:11

## 2023-11-11 RX ADMIN — HYDRALAZINE HYDROCHLORIDE 50 MG: 25 TABLET, FILM COATED ORAL at 05:11

## 2023-11-11 RX ADMIN — CALCIUM ACETATE 1334 MG: 667 CAPSULE ORAL at 12:11

## 2023-11-11 NOTE — PLAN OF CARE
Pt aaox4 vswnl and no c/o pain. Wife at bedside, bed in low position and callbell within reach. Wife speaks english when pt doesn't understand, Pt refused Heparin SQ saying he walks and already had the shot today. Telemetry maintained NSR. Pt ambulates independently. Pt to get permacath for HD on Monday and NPO Sunday night.

## 2023-11-11 NOTE — SUBJECTIVE & OBJECTIVE
Interval History:   No events overnight. Patient with no complaints. Hgb 6.8. 1 unit RBC ordered.       Review of Systems   Constitutional:  Negative for activity change, appetite change, chills, diaphoresis, fatigue and fever.   HENT:  Negative for rhinorrhea and sore throat.    Respiratory:  Negative for cough, chest tightness and shortness of breath.    Cardiovascular:  Negative for chest pain and palpitations.   Gastrointestinal:  Negative for abdominal pain, constipation, diarrhea and nausea.   Endocrine: Negative for cold intolerance.   Genitourinary:  Negative for decreased urine volume and dysuria.   Musculoskeletal:  Negative for arthralgias and myalgias.   Skin:  Negative for rash and wound.   Neurological:  Negative for dizziness, weakness, numbness and headaches.   Psychiatric/Behavioral:  Negative for agitation, behavioral problems and confusion.      Objective:     Vital Signs (Most Recent):  Temp: 98.2 °F (36.8 °C) (11/11/23 1052)  Pulse: 70 (11/11/23 1048)  Resp: 16 (11/11/23 1052)  BP: (!) 155/71 (11/11/23 1052)  SpO2: 96 % (11/11/23 1052) Vital Signs (24h Range):  Temp:  [97.5 °F (36.4 °C)-98.5 °F (36.9 °C)] 98.2 °F (36.8 °C)  Pulse:  [66-70] 70  Resp:  [16-20] 16  SpO2:  [94 %-98 %] 96 %  BP: (130-167)/(60-76) 155/71     Weight: 113.4 kg (250 lb)  Body mass index is 39.15 kg/m².    Intake/Output Summary (Last 24 hours) at 11/11/2023 1108  Last data filed at 11/11/2023 0914  Gross per 24 hour   Intake 780 ml   Output 1200 ml   Net -420 ml         Physical Exam  Vitals and nursing note reviewed.   Constitutional:       General: He is sleeping. He is not in acute distress.     Appearance: He is obese. He is not toxic-appearing or diaphoretic.   HENT:      Head: Normocephalic and atraumatic.      Nose: Nose normal.      Mouth/Throat:      Mouth: Mucous membranes are moist.   Eyes:      Extraocular Movements: Extraocular movements intact.   Cardiovascular:      Rate and Rhythm: Normal rate and regular  rhythm.      Pulses: Normal pulses.      Heart sounds: No murmur heard.  Pulmonary:      Effort: Pulmonary effort is normal. No respiratory distress.      Breath sounds: No wheezing, rhonchi or rales.   Abdominal:      General: Bowel sounds are normal. There is no distension.      Palpations: Abdomen is soft.      Tenderness: There is no abdominal tenderness. There is no guarding.   Musculoskeletal:         General: No tenderness or deformity. Normal range of motion.      Cervical back: Normal range of motion.      Right lower leg: No edema.      Left lower leg: No edema.   Skin:     General: Skin is warm and dry.      Capillary Refill: Capillary refill takes less than 2 seconds.   Neurological:      General: No focal deficit present.      Mental Status: He is oriented to person, place, and time and easily aroused.      Sensory: No sensory deficit.      Motor: No weakness.   Psychiatric:         Mood and Affect: Mood normal.         Behavior: Behavior normal.             Significant Labs: All pertinent labs within the past 24 hours have been reviewed.  CBC:   Recent Labs   Lab 11/09/23  1735 11/10/23  0655 11/10/23  1041 11/11/23  0639   WBC 7.57 7.17  --  7.29   HGB 7.3* 6.7* 7.4* 6.8*   HCT 22.4* 20.9*  --  21.4*    147*  --  153     CMP:   Recent Labs   Lab 11/09/23  1735 11/10/23  0655 11/11/23  0639    142 144   K 4.5 4.1 4.0    103 104   CO2 25 26 25   GLU 99 111* 102   BUN 91* 92* 90*   CREATININE 11.3* 11.5* 11.8*   CALCIUM 8.3* 8.2* 8.4*   PROT 6.6 5.9* 6.0   ALBUMIN 3.3* 3.0* 3.0*   BILITOT 0.4 0.3 0.4   ALKPHOS 64 55 53*   AST 23 15 13   ALT 27 21 17   ANIONGAP 15 13 15       Significant Imaging: I have reviewed all pertinent imaging results/findings within the past 24 hours.

## 2023-11-11 NOTE — ASSESSMENT & PLAN NOTE
Patient's anemia is currently controlled. Has not received any PRBCs to date. Etiology likely d/t chronic disease due to Chronic Kidney Disease/ESRD  Current CBC reviewed-   Lab Results   Component Value Date    HGB 6.8 (L) 11/11/2023    HCT 21.4 (L) 11/11/2023     - Monitor serial CBC and transfuse as needed  - Goal Hgb >7  - Trend CBC  - Consented for blood.

## 2023-11-11 NOTE — ASSESSMENT & PLAN NOTE
Creatine stable on admission. BMP reviewed- noted Estimated Creatinine Clearance: 8.7 mL/min (A) (based on SCr of 11.8 mg/dL (H)). according to latest data. Based on current GFR, CKD stage is stage 5 - GFR < 15.     - Nephrology consulted  -- Planning for the initiation of HD that initially being setup as outpatient  -- Interventional nephrology consulted for HD line placement, planned 11/13  - Monitor UOP and serial BMP and adjust therapy as needed.   - Renally dose meds and avoid nephrotoxic medications

## 2023-11-11 NOTE — PROGRESS NOTES
"[facility] Medicine  Progress Note    Patient Name: Wai Bain  MRN: [id number]  Patient Class: IP- Inpatient   Admission Date: 11/9/2023  Length of Stay: 0 days  Attending Physician: Ivan Hall MD  Primary Care Provider: Abhi Peralta MD        Subjective:     Principal Problem:Fluid overload        HPI:  Wai Bain is a 53 y.o. male with a PMHx of DM2, HTN, CKD5, and anemia who presents to the ED with complaints of shortness of breath. Per pt and his wife he is followed by nephrology outpatient. He has an appointment tomorrow for HD tunneled line placement. His wife reports he became acutely short of breath today noting he has had intermittent CAT for several months. Pt endorses associated BLE edema, fatigue, abdominal distention, non productive cough, 15lb weight gain x1 month, nausea, and decreased UO. He notes intermittent lightheadedness/ head "fullness" with exertion. The patient's wife reports he was hospitalized about 3 weeks ago at [facility] for anemia and elevated blood pressure. She states his blood pressure have been much better controlled since the addition of several antihypertensives. The patient denies fever, chills, CP, abdominal pain, vomiting, diarrhea, or dysuria.    In the ED, VSS, afebrile. CBC with stable anemia. Cr 11.3 (consistent with CKD5). Calcium 8.3. Albumin 3.3. . Troponin 0.093. EKG with SR, 67 bpm, no ST elevation or depression. CXR reveals pulmonary edema. The patient received 80mg IV lasix.      Overview/Hospital Course:  No notes on file    Interval History:   No events overnight. Patient with no complaints. Hgb 6.8. 1 unit RBC ordered.       Review of Systems   Constitutional:  Negative for activity change, appetite change, chills, diaphoresis, fatigue and fever.   HENT:  Negative for rhinorrhea and sore throat.    Respiratory:  Negative for cough, chest tightness and shortness of breath.    Cardiovascular:  " Negative for chest pain and palpitations.   Gastrointestinal:  Negative for abdominal pain, constipation, diarrhea and nausea.   Endocrine: Negative for cold intolerance.   Genitourinary:  Negative for decreased urine volume and dysuria.   Musculoskeletal:  Negative for arthralgias and myalgias.   Skin:  Negative for rash and wound.   Neurological:  Negative for dizziness, weakness, numbness and headaches.   Psychiatric/Behavioral:  Negative for agitation, behavioral problems and confusion.      Objective:     Vital Signs (Most Recent):  Temp: 98.2 °F (36.8 °C) (11/11/23 1052)  Pulse: 70 (11/11/23 1048)  Resp: 16 (11/11/23 1052)  BP: (!) 155/71 (11/11/23 1052)  SpO2: 96 % (11/11/23 1052) Vital Signs (24h Range):  Temp:  [97.5 °F (36.4 °C)-98.5 °F (36.9 °C)] 98.2 °F (36.8 °C)  Pulse:  [66-70] 70  Resp:  [16-20] 16  SpO2:  [94 %-98 %] 96 %  BP: (130-167)/(60-76) 155/71     Weight: 113.4 kg (250 lb)  Body mass index is 39.15 kg/m².    Intake/Output Summary (Last 24 hours) at 11/11/2023 1108  Last data filed at 11/11/2023 0914  Gross per 24 hour   Intake 780 ml   Output 1200 ml   Net -420 ml         Physical Exam  Vitals and nursing note reviewed.   Constitutional:       General: He is sleeping. He is not in acute distress.     Appearance: He is obese. He is not toxic-appearing or diaphoretic.   HENT:      Head: Normocephalic and atraumatic.      Nose: Nose normal.      Mouth/Throat:      Mouth: Mucous membranes are moist.   Eyes:      Extraocular Movements: Extraocular movements intact.   Cardiovascular:      Rate and Rhythm: Normal rate and regular rhythm.      Pulses: Normal pulses.      Heart sounds: No murmur heard.  Pulmonary:      Effort: Pulmonary effort is normal. No respiratory distress.      Breath sounds: No wheezing, rhonchi or rales.   Abdominal:      General: Bowel sounds are normal. There is no distension.      Palpations: Abdomen is soft.      Tenderness: There is no abdominal tenderness. There is no  guarding.   Musculoskeletal:         General: No tenderness or deformity. Normal range of motion.      Cervical back: Normal range of motion.      Right lower leg: No edema.      Left lower leg: No edema.   Skin:     General: Skin is warm and dry.      Capillary Refill: Capillary refill takes less than 2 seconds.   Neurological:      General: No focal deficit present.      Mental Status: He is oriented to person, place, and time and easily aroused.      Sensory: No sensory deficit.      Motor: No weakness.   Psychiatric:         Mood and Affect: Mood normal.         Behavior: Behavior normal.             Significant Labs: All pertinent labs within the past 24 hours have been reviewed.  CBC:   Recent Labs   Lab 11/09/23  1735 11/10/23  0655 11/10/23  1041 11/11/23  0639   WBC 7.57 7.17  --  7.29   HGB 7.3* 6.7* 7.4* 6.8*   HCT 22.4* 20.9*  --  21.4*    147*  --  153     CMP:   Recent Labs   Lab 11/09/23  1735 11/10/23  0655 11/11/23  0639    142 144   K 4.5 4.1 4.0    103 104   CO2 25 26 25   GLU 99 111* 102   BUN 91* 92* 90*   CREATININE 11.3* 11.5* 11.8*   CALCIUM 8.3* 8.2* 8.4*   PROT 6.6 5.9* 6.0   ALBUMIN 3.3* 3.0* 3.0*   BILITOT 0.4 0.3 0.4   ALKPHOS 64 55 53*   AST 23 15 13   ALT 27 21 17   ANIONGAP 15 13 15       Significant Imaging: I have reviewed all pertinent imaging results/findings within the past 24 hours.      Assessment/Plan:      * Fluid overload  Per pt and his wife he is followed by nephrology outpatient. He has an appointment tomorrow for HD tunneled line placement. His wife reports he became acutely short of breath today noting he has had intermittent CAT for several months. Pt endorses associated BLE edema, abdominal distention, cough, 15lb weight gain x1 month, nausea, and decreased UO.    -   - Trop peaked at 0.093 likely due to volume OL, patient denied CP on admit  - CXR with pulmonary edema.  - Pt received 80mg IV lasix in the ED and repeat on arrival to the  floor  - TTE 11/10: EF 60%, CVp 8, PASP 47, mild MR, TR  - Nephrology consulted  -- IV Lasix 80mg daily   - Fluid restriction  - Strict I&Os, daily weight.      CKD (chronic kidney disease), stage V  Creatine stable on admission. BMP reviewed- noted Estimated Creatinine Clearance: 8.7 mL/min (A) (based on SCr of 11.8 mg/dL (H)). according to latest data. Based on current GFR, CKD stage is stage 5 - GFR < 15.     - Nephrology consulted  -- Planning for the initiation of HD that initially being setup as outpatient  -- Interventional nephrology consulted for HD line placement, planned 11/13  - Monitor UOP and serial BMP and adjust therapy as needed.   - Renally dose meds and avoid nephrotoxic medications    Hyperphosphatemia  - Phos 5.7 on admit labs, improved from previous admission.  - Continue phosphorus binders.  - Renal diet    Severe obesity (BMI >= 40)  Body mass index is 39.15 kg/m². Morbid obesity complicates all aspects of disease management from diagnostic modalities to treatment. Weight loss encouraged and health benefits explained to patient.    Anemia due to stage 5 chronic kidney disease, not on chronic dialysis  Patient's anemia is currently controlled. Has not received any PRBCs to date. Etiology likely d/t chronic disease due to Chronic Kidney Disease/ESRD  Current CBC reviewed-   Lab Results   Component Value Date    HGB 6.8 (L) 11/11/2023    HCT 21.4 (L) 11/11/2023     - Monitor serial CBC and transfuse as needed  - Goal Hgb >7  - Trend CBC  - Consented for blood.    Essential hypertension  Chronic, controlled. Latest blood pressure and vitals reviewed-     Temp:  [97.8 °F (36.6 °C)-98.2 °F (36.8 °C)]   Pulse:  [62-70]   Resp:  [14-20]   BP: (141-160)/(67-74)   SpO2:  [94 %-98 %] .   Home meds for hypertension were reviewed and noted below.   Hypertension Medications             amLODIPine (NORVASC) 10 MG tablet Take 1 tablet (10 mg total) by mouth once daily.    carvediloL (COREG) 25 MG tablet Take  1 tablet (25 mg total) by mouth 2 (two) times daily.    hydrALAZINE (APRESOLINE) 50 MG tablet Take 1 tablet (50 mg total) by mouth every 8 (eight) hours.          While in the hospital, will manage blood pressure as follows; Continue home antihypertensive regimen    Will utilize PRN. blood pressure medication only if patient's blood pressure greater than 160/100 and he develops symptoms such as worsening chest pain or shortness of breath.    Type 2 diabetes mellitus with hyperglycemia, without long-term current use of insulin  Patient is no longer on medications.   Last A1c reviewed-   Lab Results   Component Value Date    HGBA1C 5.6 10/21/2023         VTE Risk Mitigation (From admission, onward)         Ordered     heparin (porcine) injection 5,000 Units  Every 8 hours         11/09/23 1937     IP VTE HIGH RISK PATIENT  Once         11/09/23 1937     Place sequential compression device  Until discontinued         11/09/23 1937                Discharge Planning   [place]: 11/13/2023     Code Status: Full Code   Is the patient medically ready for discharge?: No    Reason for patient still in hospital (select all that apply): Patient trending condition, Laboratory test, Treatment, Consult recommendations and Pending disposition  Discharge Plan A: Home with family                  Ivan Hall MD  Department of Hospital Medicine   [facility]

## 2023-11-11 NOTE — PLAN OF CARE
Pt sanjuanitao x4. Call bell within reach. He is Jamaican speaking. Wife at bedside. She speaks English. Using  via IPAD as needed. H/H 6.8/21.4 this am. 1 unit of prbc transfused. Will recheck labs in AM. He is up independent. He refused his heparin shots stating he has been walking around. Encouraged him to keep ambulating frequently. Plan for permacath placement Monday. He verbalized understanding of plan of care.

## 2023-11-12 LAB
ALBUMIN SERPL BCP-MCNC: 3.1 G/DL (ref 3.5–5.2)
ALP SERPL-CCNC: 58 U/L (ref 55–135)
ALT SERPL W/O P-5'-P-CCNC: 21 U/L (ref 10–44)
ANION GAP SERPL CALC-SCNC: 12 MMOL/L (ref 8–16)
APTT PPP: 27.7 SEC (ref 21–32)
AST SERPL-CCNC: 19 U/L (ref 10–40)
BASOPHILS # BLD AUTO: 0.02 K/UL (ref 0–0.2)
BASOPHILS NFR BLD: 0.3 % (ref 0–1.9)
BILIRUB SERPL-MCNC: 0.4 MG/DL (ref 0.1–1)
BUN SERPL-MCNC: 88 MG/DL (ref 6–20)
CALCIUM SERPL-MCNC: 8.3 MG/DL (ref 8.7–10.5)
CHLORIDE SERPL-SCNC: 105 MMOL/L (ref 95–110)
CO2 SERPL-SCNC: 27 MMOL/L (ref 23–29)
CREAT SERPL-MCNC: 11.4 MG/DL (ref 0.5–1.4)
DIFFERENTIAL METHOD: ABNORMAL
EOSINOPHIL # BLD AUTO: 0.4 K/UL (ref 0–0.5)
EOSINOPHIL NFR BLD: 5 % (ref 0–8)
ERYTHROCYTE [DISTWIDTH] IN BLOOD BY AUTOMATED COUNT: 13.3 % (ref 11.5–14.5)
EST. GFR  (NO RACE VARIABLE): 4.9 ML/MIN/1.73 M^2
GLUCOSE SERPL-MCNC: 106 MG/DL (ref 70–110)
HCT VFR BLD AUTO: 24.9 % (ref 40–54)
HGB BLD-MCNC: 7.8 G/DL (ref 14–18)
IMM GRANULOCYTES # BLD AUTO: 0.03 K/UL (ref 0–0.04)
IMM GRANULOCYTES NFR BLD AUTO: 0.4 % (ref 0–0.5)
INR PPP: 1.1 (ref 0.8–1.2)
LYMPHOCYTES # BLD AUTO: 0.9 K/UL (ref 1–4.8)
LYMPHOCYTES NFR BLD: 12.3 % (ref 18–48)
MAGNESIUM SERPL-MCNC: 2.2 MG/DL (ref 1.6–2.6)
MCH RBC QN AUTO: 26.8 PG (ref 27–31)
MCHC RBC AUTO-ENTMCNC: 31.3 G/DL (ref 32–36)
MCV RBC AUTO: 86 FL (ref 82–98)
MONOCYTES # BLD AUTO: 0.8 K/UL (ref 0.3–1)
MONOCYTES NFR BLD: 10.7 % (ref 4–15)
NEUTROPHILS # BLD AUTO: 5.3 K/UL (ref 1.8–7.7)
NEUTROPHILS NFR BLD: 71.3 % (ref 38–73)
NRBC BLD-RTO: 0 /100 WBC
PHOSPHATE SERPL-MCNC: 5.3 MG/DL (ref 2.7–4.5)
PLATELET # BLD AUTO: 164 K/UL (ref 150–450)
PMV BLD AUTO: 10.3 FL (ref 9.2–12.9)
POTASSIUM SERPL-SCNC: 4.2 MMOL/L (ref 3.5–5.1)
PROT SERPL-MCNC: 6.3 G/DL (ref 6–8.4)
PROTHROMBIN TIME: 12.1 SEC (ref 9–12.5)
RBC # BLD AUTO: 2.91 M/UL (ref 4.6–6.2)
SODIUM SERPL-SCNC: 144 MMOL/L (ref 136–145)
WBC # BLD AUTO: 7.37 K/UL (ref 3.9–12.7)

## 2023-11-12 PROCEDURE — 83735 ASSAY OF MAGNESIUM: CPT | Mod: NTX | Performed by: STUDENT IN AN ORGANIZED HEALTH CARE EDUCATION/TRAINING PROGRAM

## 2023-11-12 PROCEDURE — 80053 COMPREHEN METABOLIC PANEL: CPT | Mod: NTX | Performed by: STUDENT IN AN ORGANIZED HEALTH CARE EDUCATION/TRAINING PROGRAM

## 2023-11-12 PROCEDURE — 36415 COLL VENOUS BLD VENIPUNCTURE: CPT | Mod: NTX | Performed by: STUDENT IN AN ORGANIZED HEALTH CARE EDUCATION/TRAINING PROGRAM

## 2023-11-12 PROCEDURE — 84100 ASSAY OF PHOSPHORUS: CPT | Mod: NTX | Performed by: STUDENT IN AN ORGANIZED HEALTH CARE EDUCATION/TRAINING PROGRAM

## 2023-11-12 PROCEDURE — 85610 PROTHROMBIN TIME: CPT | Mod: NTX | Performed by: STUDENT IN AN ORGANIZED HEALTH CARE EDUCATION/TRAINING PROGRAM

## 2023-11-12 PROCEDURE — 63600175 PHARM REV CODE 636 W HCPCS: Mod: NTX | Performed by: STUDENT IN AN ORGANIZED HEALTH CARE EDUCATION/TRAINING PROGRAM

## 2023-11-12 PROCEDURE — 25000003 PHARM REV CODE 250: Mod: NTX | Performed by: STUDENT IN AN ORGANIZED HEALTH CARE EDUCATION/TRAINING PROGRAM

## 2023-11-12 PROCEDURE — 85025 COMPLETE CBC W/AUTO DIFF WBC: CPT | Mod: NTX | Performed by: STUDENT IN AN ORGANIZED HEALTH CARE EDUCATION/TRAINING PROGRAM

## 2023-11-12 PROCEDURE — 25000003 PHARM REV CODE 250: Mod: NTX | Performed by: NURSE PRACTITIONER

## 2023-11-12 PROCEDURE — 20600001 HC STEP DOWN PRIVATE ROOM: Mod: NTX

## 2023-11-12 PROCEDURE — 85730 THROMBOPLASTIN TIME PARTIAL: CPT | Mod: NTX | Performed by: STUDENT IN AN ORGANIZED HEALTH CARE EDUCATION/TRAINING PROGRAM

## 2023-11-12 RX ORDER — HEPARIN SODIUM 5000 [USP'U]/ML
5000 INJECTION, SOLUTION INTRAVENOUS; SUBCUTANEOUS EVERY 8 HOURS
Status: DISCONTINUED | OUTPATIENT
Start: 2023-11-15 | End: 2023-11-15 | Stop reason: HOSPADM

## 2023-11-12 RX ADMIN — CALCIUM ACETATE 1334 MG: 667 CAPSULE ORAL at 08:11

## 2023-11-12 RX ADMIN — HYDRALAZINE HYDROCHLORIDE 75 MG: 50 TABLET ORAL at 02:11

## 2023-11-12 RX ADMIN — AMLODIPINE BESYLATE 10 MG: 10 TABLET ORAL at 08:11

## 2023-11-12 RX ADMIN — CARVEDILOL 25 MG: 12.5 TABLET, FILM COATED ORAL at 08:11

## 2023-11-12 RX ADMIN — CALCIUM ACETATE 1334 MG: 667 CAPSULE ORAL at 04:11

## 2023-11-12 RX ADMIN — SODIUM BICARBONATE 650 MG TABLET 1300 MG: at 08:11

## 2023-11-12 RX ADMIN — FUROSEMIDE 80 MG: 10 INJECTION, SOLUTION INTRAVENOUS at 08:11

## 2023-11-12 RX ADMIN — CALCIUM ACETATE 1334 MG: 667 CAPSULE ORAL at 11:11

## 2023-11-12 RX ADMIN — SODIUM ZIRCONIUM CYCLOSILICATE 10 G: 5 POWDER, FOR SUSPENSION ORAL at 08:11

## 2023-11-12 RX ADMIN — HYDRALAZINE HYDROCHLORIDE 75 MG: 50 TABLET ORAL at 06:11

## 2023-11-12 RX ADMIN — SODIUM BICARBONATE 650 MG TABLET 1300 MG: at 02:11

## 2023-11-12 RX ADMIN — HYDRALAZINE HYDROCHLORIDE 75 MG: 50 TABLET ORAL at 08:11

## 2023-11-12 NOTE — CARE UPDATE
Interventional Nephrology:    The patient is scheduled for cuffed tunneled HD catheter tomorrow.     Please hold Heparin from tonight and keep him NPO after midnight.     CHANDLER JOYNER.Lauryn. MD. WADE. JAQUELIN.  , [employer] [facility] [employer] ([place]  Nephrology Consultant. [facility].   [facility]. 5th [place] [address].    email: [email or web address]  Tel: Office: [phone]

## 2023-11-12 NOTE — ASSESSMENT & PLAN NOTE
Patient's anemia is currently controlled. Has not received any PRBCs to date. Etiology likely d/t chronic disease due to Chronic Kidney Disease/ESRD  Current CBC reviewed-   Lab Results   Component Value Date    HGB 7.8 (L) 11/12/2023    HCT 24.9 (L) 11/12/2023     - Monitor serial CBC and transfuse as needed  - Goal Hgb >7  - Trend CBC  - Consented for blood.

## 2023-11-12 NOTE — PLAN OF CARE
AAOx4. VSS. Patient is Macedonian speaking, but understands some english - Ipad  in use as needed. Patient up independently in room and bathroom - sat up in chair for majority of this shift. Cr 11.4 (down from 11.8 prior). H/H 7.8/24.9 (increased from 6.8/21.4 prior) - 1unit PRBCs transfused yesterday 11/11. Patient had BM x1 this shift. Patient voiding clear yellow urine in urinal - see flowsheet for exact UOP amount. Per MD order - heparin SubQ injections held. Patient to go for a permcath placement tomorrow 11/13 for HD. Patient is to be NPO at midnight tonight. Non-skid socks on. Bed in low position. Call light within reach.

## 2023-11-12 NOTE — ASSESSMENT & PLAN NOTE
Creatine stable on admission. BMP reviewed- noted Estimated Creatinine Clearance: 9 mL/min (A) (based on SCr of 11.4 mg/dL (H)). according to latest data. Based on current GFR, CKD stage is stage 5 - GFR < 15.     - Nephrology consulted  -- To start initiation of HD inpatient that initially being setup as outpatient prior to admission  -- Interventional nephrology consulted for HD line placement, planned 11/13  - Monitor UOP and serial BMP and adjust therapy as needed.   - Renally dose meds and avoid nephrotoxic medications

## 2023-11-12 NOTE — ASSESSMENT & PLAN NOTE
Per pt and his wife he is followed by nephrology outpatient. He has an appointment tomorrow for HD tunneled line placement. His wife reports he became acutely short of breath today noting he has had intermittent CAT for several months. Pt endorses associated BLE edema, abdominal distention, cough, 15lb weight gain x1 month, nausea, and decreased UO.    -   - Trop peaked at 0.093 likely due to volume OL, patient denied CP on admit  - CXR with pulmonary edema.  - Pt received 80mg IV lasix in the ED and repeat on arrival to the floor  - TTE 11/10: EF 60%, CVp 8, PASP 47, mild MR, TR  - Nephrology consulted  -- IV Lasix 80mg daily   -- Initiation of HD post line placement  - Fluid restriction  - Strict I&Os, daily weight.

## 2023-11-12 NOTE — SUBJECTIVE & OBJECTIVE
Interval History:   No events overnight. Hgb improved post blood transfusion yesterday. No complaints today.       Review of Systems   Constitutional:  Negative for activity change, appetite change, chills, diaphoresis, fatigue and fever.   HENT:  Negative for rhinorrhea and sore throat.    Respiratory:  Negative for cough, chest tightness and shortness of breath.    Cardiovascular:  Positive for leg swelling. Negative for chest pain and palpitations.   Gastrointestinal:  Negative for abdominal pain, constipation, diarrhea and nausea.   Endocrine: Negative for cold intolerance.   Genitourinary:  Negative for decreased urine volume and dysuria.   Musculoskeletal:  Negative for arthralgias and myalgias.   Skin:  Negative for rash and wound.   Neurological:  Negative for dizziness, weakness, numbness and headaches.   Psychiatric/Behavioral:  Negative for agitation, behavioral problems and confusion.      Objective:     Vital Signs (Most Recent):  Temp: 98.4 °F (36.9 °C) (11/12/23 1110)  Pulse: 68 (11/12/23 1110)  Resp: 18 (11/12/23 1110)  BP: (!) 160/72 (11/12/23 1110)  SpO2: (!) 93 % (11/12/23 1110) Vital Signs (24h Range):  Temp:  [95 °F (35 °C)-98.8 °F (37.1 °C)] 98.4 °F (36.9 °C)  Pulse:  [66-70] 68  Resp:  [16-19] 18  SpO2:  [93 %-97 %] 93 %  BP: (138-160)/(64-72) 160/72     Weight: 112.6 kg (248 lb 5.6 oz)  Body mass index is 38.89 kg/m².    Intake/Output Summary (Last 24 hours) at 11/12/2023 1147  Last data filed at 11/12/2023 0921  Gross per 24 hour   Intake 1200.42 ml   Output 965 ml   Net 235.42 ml         Physical Exam  Vitals and nursing note reviewed.   Constitutional:       General: He is sleeping. He is not in acute distress.     Appearance: He is obese. He is not toxic-appearing or diaphoretic.   HENT:      Head: Normocephalic and atraumatic.      Nose: Nose normal.      Mouth/Throat:      Mouth: Mucous membranes are moist.   Eyes:      Extraocular Movements: Extraocular movements intact.    Cardiovascular:      Rate and Rhythm: Normal rate and regular rhythm.      Pulses: Normal pulses.      Heart sounds: No murmur heard.  Pulmonary:      Effort: Pulmonary effort is normal. No respiratory distress.      Breath sounds: No wheezing, rhonchi or rales.   Abdominal:      General: Bowel sounds are normal. There is no distension.      Palpations: Abdomen is soft.      Tenderness: There is no abdominal tenderness. There is no guarding.   Musculoskeletal:         General: No tenderness or deformity. Normal range of motion.      Cervical back: Normal range of motion.      Right lower leg: Edema (Trace) present.      Left lower leg: Edema (Trace) present.   Skin:     General: Skin is warm and dry.      Capillary Refill: Capillary refill takes less than 2 seconds.   Neurological:      General: No focal deficit present.      Mental Status: He is oriented to person, place, and time and easily aroused.      Sensory: No sensory deficit.      Motor: No weakness.   Psychiatric:         Mood and Affect: Mood normal.         Behavior: Behavior normal.             Significant Labs: All pertinent labs within the past 24 hours have been reviewed.  CBC:   Recent Labs   Lab 11/11/23  0639 11/12/23  0704   WBC 7.29 7.37   HGB 6.8* 7.8*   HCT 21.4* 24.9*    164     CMP:   Recent Labs   Lab 11/11/23  0639 11/12/23  0704    144   K 4.0 4.2    105   CO2 25 27    106   BUN 90* 88*   CREATININE 11.8* 11.4*   CALCIUM 8.4* 8.3*   PROT 6.0 6.3   ALBUMIN 3.0* 3.1*   BILITOT 0.4 0.4   ALKPHOS 53* 58   AST 13 19   ALT 17 21   ANIONGAP 15 12       Significant Imaging: I have reviewed all pertinent imaging results/findings within the past 24 hours.

## 2023-11-12 NOTE — PROGRESS NOTES
"[facility] Medicine  Progress Note    Patient Name: Wai Bain  MRN: [id number]  Patient Class: IP- Inpatient   Admission Date: 11/9/2023  Length of Stay: 1 days  Attending Physician: Ivan Hall MD  Primary Care Provider: Abhi Peralta MD        Subjective:     Principal Problem:Fluid overload        HPI:  Wai Bain is a 53 y.o. male with a PMHx of DM2, HTN, CKD5, and anemia who presents to the ED with complaints of shortness of breath. Per pt and his wife he is followed by nephrology outpatient. He has an appointment tomorrow for HD tunneled line placement. His wife reports he became acutely short of breath today noting he has had intermittent CAT for several months. Pt endorses associated BLE edema, fatigue, abdominal distention, non productive cough, 15lb weight gain x1 month, nausea, and decreased UO. He notes intermittent lightheadedness/ head "fullness" with exertion. The patient's wife reports he was hospitalized about 3 weeks ago at [facility] for anemia and elevated blood pressure. She states his blood pressure have been much better controlled since the addition of several antihypertensives. The patient denies fever, chills, CP, abdominal pain, vomiting, diarrhea, or dysuria.    In the ED, VSS, afebrile. CBC with stable anemia. Cr 11.3 (consistent with CKD5). Calcium 8.3. Albumin 3.3. . Troponin 0.093. EKG with SR, 67 bpm, no ST elevation or depression. CXR reveals pulmonary edema. The patient received 80mg IV lasix.      Overview/Hospital Course:  No notes on file    Interval History:   No events overnight. Hgb improved post blood transfusion yesterday. No complaints today.       Review of Systems   Constitutional:  Negative for activity change, appetite change, chills, diaphoresis, fatigue and fever.   HENT:  Negative for rhinorrhea and sore throat.    Respiratory:  Negative for cough, chest tightness and shortness of breath.  " [id number] [facility] CRISTINA Cordero  MR#: [id number]  : 1943  ACCOUNT #: [de-identified]   DATE OF SERVICE: 2018    PROCEDURE:  Cardioversion for recent onset atrial fibrillation. The patient has had 1 unsuccessful cardioversion approximately 6-8 weeks ago after pneumonia. He is now clear of any respiratory illnesses, has been on Eliquis without missing any doses and is on amiodarone. He has confirmed AFib by EKG today. After obtaining informed consent, anterior and posterior patches were placed. The patient received 6 mg of Versed and 50 mcg of fentanyl IV. A 200 joule synchronized direct current cardioversion was delivered, restoring normal sinus rhythm. There were no complications. CONCLUSION:  Successful cardioversion with restitution of normal sinus rhythm.       MD Octavio Paez / Raad Mancera  D: 2018 08:12     T: 2018 11:59  JOB #: [id number]   Cardiovascular:  Positive for leg swelling. Negative for chest pain and palpitations.   Gastrointestinal:  Negative for abdominal pain, constipation, diarrhea and nausea.   Endocrine: Negative for cold intolerance.   Genitourinary:  Negative for decreased urine volume and dysuria.   Musculoskeletal:  Negative for arthralgias and myalgias.   Skin:  Negative for rash and wound.   Neurological:  Negative for dizziness, weakness, numbness and headaches.   Psychiatric/Behavioral:  Negative for agitation, behavioral problems and confusion.      Objective:     Vital Signs (Most Recent):  Temp: 98.4 °F (36.9 °C) (11/12/23 1110)  Pulse: 68 (11/12/23 1110)  Resp: 18 (11/12/23 1110)  BP: (!) 160/72 (11/12/23 1110)  SpO2: (!) 93 % (11/12/23 1110) Vital Signs (24h Range):  Temp:  [95 °F (35 °C)-98.8 °F (37.1 °C)] 98.4 °F (36.9 °C)  Pulse:  [66-70] 68  Resp:  [16-19] 18  SpO2:  [93 %-97 %] 93 %  BP: (138-160)/(64-72) 160/72     Weight: 112.6 kg (248 lb 5.6 oz)  Body mass index is 38.89 kg/m².    Intake/Output Summary (Last 24 hours) at 11/12/2023 1147  Last data filed at 11/12/2023 0921  Gross per 24 hour   Intake 1200.42 ml   Output 965 ml   Net 235.42 ml         Physical Exam  Vitals and nursing note reviewed.   Constitutional:       General: He is sleeping. He is not in acute distress.     Appearance: He is obese. He is not toxic-appearing or diaphoretic.   HENT:      Head: Normocephalic and atraumatic.      Nose: Nose normal.      Mouth/Throat:      Mouth: Mucous membranes are moist.   Eyes:      Extraocular Movements: Extraocular movements intact.   Cardiovascular:      Rate and Rhythm: Normal rate and regular rhythm.      Pulses: Normal pulses.      Heart sounds: No murmur heard.  Pulmonary:      Effort: Pulmonary effort is normal. No respiratory distress.      Breath sounds: No wheezing, rhonchi or rales.   Abdominal:      General: Bowel sounds are normal. There is no distension.      Palpations: Abdomen is soft.       Tenderness: There is no abdominal tenderness. There is no guarding.   Musculoskeletal:         General: No tenderness or deformity. Normal range of motion.      Cervical back: Normal range of motion.      Right lower leg: Edema (Trace) present.      Left lower leg: Edema (Trace) present.   Skin:     General: Skin is warm and dry.      Capillary Refill: Capillary refill takes less than 2 seconds.   Neurological:      General: No focal deficit present.      Mental Status: He is oriented to person, place, and time and easily aroused.      Sensory: No sensory deficit.      Motor: No weakness.   Psychiatric:         Mood and Affect: Mood normal.         Behavior: Behavior normal.             Significant Labs: All pertinent labs within the past 24 hours have been reviewed.  CBC:   Recent Labs   Lab 11/11/23  0639 11/12/23  0704   WBC 7.29 7.37   HGB 6.8* 7.8*   HCT 21.4* 24.9*    164     CMP:   Recent Labs   Lab 11/11/23  0639 11/12/23  0704    144   K 4.0 4.2    105   CO2 25 27    106   BUN 90* 88*   CREATININE 11.8* 11.4*   CALCIUM 8.4* 8.3*   PROT 6.0 6.3   ALBUMIN 3.0* 3.1*   BILITOT 0.4 0.4   ALKPHOS 53* 58   AST 13 19   ALT 17 21   ANIONGAP 15 12       Significant Imaging: I have reviewed all pertinent imaging results/findings within the past 24 hours.      Assessment/Plan:      * Fluid overload  Per pt and his wife he is followed by nephrology outpatient. He has an appointment tomorrow for HD tunneled line placement. His wife reports he became acutely short of breath today noting he has had intermittent CAT for several months. Pt endorses associated BLE edema, abdominal distention, cough, 15lb weight gain x1 month, nausea, and decreased UO.    -   - Trop peaked at 0.093 likely due to volume OL, patient denied CP on admit  - CXR with pulmonary edema.  - Pt received 80mg IV lasix in the ED and repeat on arrival to the floor  - TTE 11/10: EF 60%, CVp 8, PASP 47, mild MR, TR  -  Nephrology consulted  -- IV Lasix 80mg daily   -- Initiation of HD post line placement  - Fluid restriction  - Strict I&Os, daily weight.      CKD (chronic kidney disease), stage V  Creatine stable on admission. BMP reviewed- noted Estimated Creatinine Clearance: 9 mL/min (A) (based on SCr of 11.4 mg/dL (H)). according to latest data. Based on current GFR, CKD stage is stage 5 - GFR < 15.     - Nephrology consulted  -- To start initiation of HD inpatient that initially being setup as outpatient prior to admission  -- Interventional nephrology consulted for HD line placement, planned 11/13  - Monitor UOP and serial BMP and adjust therapy as needed.   - Renally dose meds and avoid nephrotoxic medications    Hyperphosphatemia  - Phos 5.7 on admit labs, improved from previous admission.  - Continue phosphorus binders.  - Renal diet    Severe obesity (BMI >= 40)  Body mass index is 39.15 kg/m². Morbid obesity complicates all aspects of disease management from diagnostic modalities to treatment. Weight loss encouraged and health benefits explained to patient.    Anemia due to stage 5 chronic kidney disease, not on chronic dialysis  Patient's anemia is currently controlled. Has not received any PRBCs to date. Etiology likely d/t chronic disease due to Chronic Kidney Disease/ESRD  Current CBC reviewed-   Lab Results   Component Value Date    HGB 7.8 (L) 11/12/2023    HCT 24.9 (L) 11/12/2023     - Monitor serial CBC and transfuse as needed  - Goal Hgb >7  - Trend CBC  - Consented for blood.    Essential hypertension  Chronic, controlled. Latest blood pressure and vitals reviewed-     Temp:  [97.8 °F (36.6 °C)-98.2 °F (36.8 °C)]   Pulse:  [62-70]   Resp:  [14-20]   BP: (141-160)/(67-74)   SpO2:  [94 %-98 %] .   Home meds for hypertension were reviewed and noted below.   Hypertension Medications             amLODIPine (NORVASC) 10 MG tablet Take 1 tablet (10 mg total) by mouth once daily.    carvediloL (COREG) 25 MG tablet  Take 1 tablet (25 mg total) by mouth 2 (two) times daily.    hydrALAZINE (APRESOLINE) 50 MG tablet Take 1 tablet (50 mg total) by mouth every 8 (eight) hours.          While in the hospital, will manage blood pressure as follows; Continue home antihypertensive regimen    Will utilize PRN. blood pressure medication only if patient's blood pressure greater than 160/100 and he develops symptoms such as worsening chest pain or shortness of breath.    Type 2 diabetes mellitus with hyperglycemia, without long-term current use of insulin  Patient is no longer on medications.   Last A1c reviewed-   Lab Results   Component Value Date    HGBA1C 5.6 10/21/2023         VTE Risk Mitigation (From admission, onward)         Ordered     heparin (porcine) injection 5,000 Units  Every 8 hours         11/12/23 1035     IP VTE HIGH RISK PATIENT  Once         11/09/23 1937     Place sequential compression device  Until discontinued         11/09/23 1937                Discharge Planning   [place]: 11/14/2023     Code Status: Full Code   Is the patient medically ready for discharge?: No    Reason for patient still in hospital (select all that apply): Patient trending condition, Laboratory test, Treatment, Consult recommendations and Pending disposition  Discharge Plan A: Home with family                  Ivan Hall MD  Department of Hospital Medicine   [facility]

## 2023-11-12 NOTE — PLAN OF CARE
PLAN OF CARE NOTE     Fall bundle in place. Pt. Remained free from falls/rauma/injuries. No complaints of CP/ SOB/discomfort. VSS. A&Ox4. Tele, NSR. RA. Pt. denies pain this shift. Pt. Had 2 unmeasured occurrences due to void on the toilet & not using the urinal, pt. Reminded of importance of urinary measurement by RN; extra urinals provided to have @ bedside & in bathroom. The POC reviewed w/ pt. & pts' family @ bedside, questions were answered & encouraged. No further concerns at this time.

## 2023-11-13 LAB
ALBUMIN SERPL BCP-MCNC: 2.9 G/DL (ref 3.5–5.2)
ALP SERPL-CCNC: 58 U/L (ref 55–135)
ALT SERPL W/O P-5'-P-CCNC: 16 U/L (ref 10–44)
ANION GAP SERPL CALC-SCNC: 12 MMOL/L (ref 8–16)
AST SERPL-CCNC: 14 U/L (ref 10–40)
BASOPHILS # BLD AUTO: 0.01 K/UL (ref 0–0.2)
BASOPHILS NFR BLD: 0.1 % (ref 0–1.9)
BILIRUB SERPL-MCNC: 0.4 MG/DL (ref 0.1–1)
BUN SERPL-MCNC: 86 MG/DL (ref 6–20)
CALCIUM SERPL-MCNC: 8.5 MG/DL (ref 8.7–10.5)
CHLORIDE SERPL-SCNC: 103 MMOL/L (ref 95–110)
CO2 SERPL-SCNC: 27 MMOL/L (ref 23–29)
CREAT SERPL-MCNC: 11.4 MG/DL (ref 0.5–1.4)
DIFFERENTIAL METHOD: ABNORMAL
EOSINOPHIL # BLD AUTO: 0.4 K/UL (ref 0–0.5)
EOSINOPHIL NFR BLD: 6 % (ref 0–8)
ERYTHROCYTE [DISTWIDTH] IN BLOOD BY AUTOMATED COUNT: 12.9 % (ref 11.5–14.5)
EST. GFR  (NO RACE VARIABLE): 4.9 ML/MIN/1.73 M^2
GLUCOSE SERPL-MCNC: 94 MG/DL (ref 70–110)
HCT VFR BLD AUTO: 23.5 % (ref 40–54)
HGB BLD-MCNC: 7.1 G/DL (ref 14–18)
IMM GRANULOCYTES # BLD AUTO: 0.02 K/UL (ref 0–0.04)
IMM GRANULOCYTES NFR BLD AUTO: 0.3 % (ref 0–0.5)
LYMPHOCYTES # BLD AUTO: 0.9 K/UL (ref 1–4.8)
LYMPHOCYTES NFR BLD: 12.4 % (ref 18–48)
MAGNESIUM SERPL-MCNC: 2.1 MG/DL (ref 1.6–2.6)
MCH RBC QN AUTO: 25.9 PG (ref 27–31)
MCHC RBC AUTO-ENTMCNC: 30.2 G/DL (ref 32–36)
MCV RBC AUTO: 86 FL (ref 82–98)
MONOCYTES # BLD AUTO: 0.9 K/UL (ref 0.3–1)
MONOCYTES NFR BLD: 12.9 % (ref 4–15)
NEUTROPHILS # BLD AUTO: 4.8 K/UL (ref 1.8–7.7)
NEUTROPHILS NFR BLD: 68.3 % (ref 38–73)
NRBC BLD-RTO: 0 /100 WBC
PHOSPHATE SERPL-MCNC: 5.3 MG/DL (ref 2.7–4.5)
PLATELET # BLD AUTO: 146 K/UL (ref 150–450)
PMV BLD AUTO: 10.1 FL (ref 9.2–12.9)
POTASSIUM SERPL-SCNC: 4.1 MMOL/L (ref 3.5–5.1)
PROT SERPL-MCNC: 5.9 G/DL (ref 6–8.4)
RBC # BLD AUTO: 2.74 M/UL (ref 4.6–6.2)
SODIUM SERPL-SCNC: 142 MMOL/L (ref 136–145)
WBC # BLD AUTO: 6.96 K/UL (ref 3.9–12.7)

## 2023-11-13 PROCEDURE — 99233 PR SUBSEQUENT HOSPITAL CARE,LEVL III: ICD-10-PCS | Mod: NTX,,, | Performed by: INTERNAL MEDICINE

## 2023-11-13 PROCEDURE — 25000003 PHARM REV CODE 250: Mod: NTX | Performed by: NURSE PRACTITIONER

## 2023-11-13 PROCEDURE — 99233 SBSQ HOSP IP/OBS HIGH 50: CPT | Mod: NTX,,, | Performed by: INTERNAL MEDICINE

## 2023-11-13 PROCEDURE — 86920 COMPATIBILITY TEST SPIN: CPT | Mod: NTX | Performed by: STUDENT IN AN ORGANIZED HEALTH CARE EDUCATION/TRAINING PROGRAM

## 2023-11-13 PROCEDURE — 36415 COLL VENOUS BLD VENIPUNCTURE: CPT | Mod: NTX | Performed by: STUDENT IN AN ORGANIZED HEALTH CARE EDUCATION/TRAINING PROGRAM

## 2023-11-13 PROCEDURE — 25000003 PHARM REV CODE 250: Mod: NTX | Performed by: STUDENT IN AN ORGANIZED HEALTH CARE EDUCATION/TRAINING PROGRAM

## 2023-11-13 PROCEDURE — 63600175 PHARM REV CODE 636 W HCPCS: Mod: NTX | Performed by: STUDENT IN AN ORGANIZED HEALTH CARE EDUCATION/TRAINING PROGRAM

## 2023-11-13 PROCEDURE — 36558 PR INSERT TUNNELED CV CATH W/O PORT OR PUMP: ICD-10-PCS | Mod: RT,NTX,, | Performed by: INTERNAL MEDICINE

## 2023-11-13 PROCEDURE — 63600175 PHARM REV CODE 636 W HCPCS: Mod: NTX | Performed by: INTERNAL MEDICINE

## 2023-11-13 PROCEDURE — 83735 ASSAY OF MAGNESIUM: CPT | Mod: NTX | Performed by: STUDENT IN AN ORGANIZED HEALTH CARE EDUCATION/TRAINING PROGRAM

## 2023-11-13 PROCEDURE — 76937 PR  US GUIDE, VASCULAR ACCESS: ICD-10-PCS | Mod: 26,NTX,, | Performed by: INTERNAL MEDICINE

## 2023-11-13 PROCEDURE — 85025 COMPLETE CBC W/AUTO DIFF WBC: CPT | Mod: NTX | Performed by: STUDENT IN AN ORGANIZED HEALTH CARE EDUCATION/TRAINING PROGRAM

## 2023-11-13 PROCEDURE — 80053 COMPREHEN METABOLIC PANEL: CPT | Mod: NTX | Performed by: STUDENT IN AN ORGANIZED HEALTH CARE EDUCATION/TRAINING PROGRAM

## 2023-11-13 PROCEDURE — 36558 INSERT TUNNELED CV CATH: CPT | Mod: RT,NTX,, | Performed by: INTERNAL MEDICINE

## 2023-11-13 PROCEDURE — 20600001 HC STEP DOWN PRIVATE ROOM: Mod: NTX

## 2023-11-13 PROCEDURE — 76937 US GUIDE VASCULAR ACCESS: CPT | Mod: NTX | Performed by: INTERNAL MEDICINE

## 2023-11-13 PROCEDURE — 36558 INSERT TUNNELED CV CATH: CPT | Mod: RT,NTX | Performed by: INTERNAL MEDICINE

## 2023-11-13 PROCEDURE — 63600175 PHARM REV CODE 636 W HCPCS: Mod: JG,NTX

## 2023-11-13 PROCEDURE — 76937 US GUIDE VASCULAR ACCESS: CPT | Mod: 26,NTX,, | Performed by: INTERNAL MEDICINE

## 2023-11-13 PROCEDURE — 99152 MOD SED SAME PHYS/QHP 5/>YRS: CPT | Mod: NTX | Performed by: INTERNAL MEDICINE

## 2023-11-13 PROCEDURE — 77001 CHG FLUOROGUIDE CNTRL VEN ACCESS,PLACE,REPLACE,REMOVE: ICD-10-PCS | Mod: 26,NTX,, | Performed by: INTERNAL MEDICINE

## 2023-11-13 PROCEDURE — C1769 GUIDE WIRE: HCPCS | Mod: NTX | Performed by: INTERNAL MEDICINE

## 2023-11-13 PROCEDURE — 77001 FLUOROGUIDE FOR VEIN DEVICE: CPT | Mod: NTX | Performed by: INTERNAL MEDICINE

## 2023-11-13 PROCEDURE — 25000003 PHARM REV CODE 250: Mod: NTX | Performed by: INTERNAL MEDICINE

## 2023-11-13 PROCEDURE — 25000003 PHARM REV CODE 250: Mod: NTX

## 2023-11-13 PROCEDURE — 77001 FLUOROGUIDE FOR VEIN DEVICE: CPT | Mod: 26,NTX,, | Performed by: INTERNAL MEDICINE

## 2023-11-13 PROCEDURE — 84100 ASSAY OF PHOSPHORUS: CPT | Mod: NTX | Performed by: STUDENT IN AN ORGANIZED HEALTH CARE EDUCATION/TRAINING PROGRAM

## 2023-11-13 PROCEDURE — C1750 CATH, HEMODIALYSIS,LONG-TERM: HCPCS | Mod: NTX | Performed by: INTERNAL MEDICINE

## 2023-11-13 DEVICE — 14F X 28CM SPLIT14F X 28CM SPLIT CATH®III CATHETER SET (CUFF 23CM FROM TIP)(CUFF 23CM F
Type: IMPLANTABLE DEVICE | Site: NECK | Status: FUNCTIONAL
Brand: SPLIT-CATH®III

## 2023-11-13 RX ORDER — SODIUM CHLORIDE 9 MG/ML
INJECTION, SOLUTION INTRAVENOUS
Status: DISCONTINUED | OUTPATIENT
Start: 2023-11-13 | End: 2023-11-15 | Stop reason: HOSPADM

## 2023-11-13 RX ORDER — HYDROCODONE BITARTRATE AND ACETAMINOPHEN 500; 5 MG/1; MG/1
TABLET ORAL
Status: DISCONTINUED | OUTPATIENT
Start: 2023-11-13 | End: 2023-11-15 | Stop reason: HOSPADM

## 2023-11-13 RX ORDER — ACETAMINOPHEN 10 MG/ML
INJECTION, SOLUTION INTRAVENOUS
Status: DISCONTINUED | OUTPATIENT
Start: 2023-11-13 | End: 2023-11-15 | Stop reason: HOSPADM

## 2023-11-13 RX ORDER — FENTANYL CITRATE 50 UG/ML
INJECTION, SOLUTION INTRAMUSCULAR; INTRAVENOUS
Status: DISCONTINUED | OUTPATIENT
Start: 2023-11-13 | End: 2023-11-13 | Stop reason: HOSPADM

## 2023-11-13 RX ORDER — LIDOCAINE HCL/EPINEPHRINE/PF 2%-1:200K
VIAL (ML) INJECTION
Status: DISCONTINUED | OUTPATIENT
Start: 2023-11-13 | End: 2023-11-13 | Stop reason: HOSPADM

## 2023-11-13 RX ORDER — HEPARIN SODIUM 1000 [USP'U]/ML
INJECTION, SOLUTION INTRAVENOUS; SUBCUTANEOUS
Status: DISCONTINUED | OUTPATIENT
Start: 2023-11-13 | End: 2023-11-13 | Stop reason: HOSPADM

## 2023-11-13 RX ORDER — MIDAZOLAM HYDROCHLORIDE 2 MG/2ML
INJECTION, SOLUTION INTRAMUSCULAR; INTRAVENOUS
Status: DISCONTINUED | OUTPATIENT
Start: 2023-11-13 | End: 2023-11-13 | Stop reason: HOSPADM

## 2023-11-13 RX ADMIN — SODIUM BICARBONATE 650 MG TABLET 1300 MG: at 02:11

## 2023-11-13 RX ADMIN — FUROSEMIDE 80 MG: 10 INJECTION, SOLUTION INTRAVENOUS at 08:11

## 2023-11-13 RX ADMIN — ACETAMINOPHEN 650 MG: 325 TABLET ORAL at 07:11

## 2023-11-13 RX ADMIN — HYDRALAZINE HYDROCHLORIDE 75 MG: 50 TABLET ORAL at 02:11

## 2023-11-13 RX ADMIN — CARVEDILOL 25 MG: 12.5 TABLET, FILM COATED ORAL at 08:11

## 2023-11-13 RX ADMIN — HYDRALAZINE HYDROCHLORIDE 75 MG: 50 TABLET ORAL at 06:11

## 2023-11-13 RX ADMIN — AMLODIPINE BESYLATE 10 MG: 10 TABLET ORAL at 08:11

## 2023-11-13 RX ADMIN — HYDRALAZINE HYDROCHLORIDE 75 MG: 50 TABLET ORAL at 07:11

## 2023-11-13 RX ADMIN — DESMOPRESSIN ACETATE 33.63 MCG: 4 SOLUTION INTRAVENOUS at 11:11

## 2023-11-13 RX ADMIN — CARVEDILOL 25 MG: 12.5 TABLET, FILM COATED ORAL at 07:11

## 2023-11-13 RX ADMIN — SODIUM BICARBONATE 650 MG TABLET 1300 MG: at 07:11

## 2023-11-13 RX ADMIN — CALCIUM ACETATE 1334 MG: 667 CAPSULE ORAL at 04:11

## 2023-11-13 RX ADMIN — SODIUM BICARBONATE 650 MG TABLET 1300 MG: at 08:11

## 2023-11-13 NOTE — PROGRESS NOTES
"[facility]  Nephrology  Progress Note    Patient Name: Wai Bain  MRN: [id number]  Admission Date: 11/9/2023  Hospital Length of Stay: 2 days  Attending Provider: Ivan Hall MD   Primary Care Physician: Abhi Peralta MD  Principal Problem:Fluid overload    Subjective:     HPI: HPI: Wai Bain is a 53 y.o. male with a PMHx of DM2, HTN, CKD5, and anemia who presents to the ED with complaints of shortness of breath. Per pt and his wife he is followed by nephrology outpatient. He has an appointment tomorrow for HD tunneled line placement. His wife reports he became acutely short of breath today noting he has had intermittent CAT for several months. Pt endorses associated BLE edema, fatigue, abdominal distention, non productive cough, 15lb weight gain x1 month, nausea, and decreased UO. He notes intermittent lightheadedness/ head "fullness" with exertion. The patient's wife reports he was hospitalized about 3 weeks ago at [facility] for anemia and elevated blood pressure. She states his blood pressure have been much better controlled since the addition of several antihypertensives. The patient denies fever, chills, CP, abdominal pain, vomiting, diarrhea, or dysuria.     In the ED, VSS, afebrile. CBC with stable anemia. Cr 11.3 (consistent with CKD5). Calcium 8.3. Albumin 3.3. . Troponin 0.093. EKG with SR, 67 bpm, no ST elevation or depression. CXR reveals pulmonary edema. The patient received 80mg IV lasix    Past Medical History:   Diagnosis Date    CKD (chronic kidney disease), stage V 10/21/2023    Diabetes mellitus     Diabetes mellitus, type 2     H/O esophagogastroduodenoscopy     Hypertension     Urinary tract infection     History       Past Surgical History:   Procedure Laterality Date    ESOPHAGOGASTRODUODENOSCOPY N/A 03/13/2020    Procedure: EGD (ESOPHAGOGASTRODUODENOSCOPY);  Surgeon: Katiuska العراقي MD;  Location: [facility];  Service: Endoscopy;  " "Laterality: N/A;    LAPAROSCOPIC CHOLECYSTECTOMY N/A 06/22/2020    Procedure: CHOLECYSTECTOMY, LAPAROSCOPIC;  Surgeon: Dayron Conner MD;  Location: [facility];  Service: General;  Laterality: N/A;       Review of patient's allergies indicates:  No Known Allergies  Current Facility-Administered Medications   Medication Frequency    0.9%  NaCl infusion (for blood administration) Q24H PRN    acetaminophen tablet 1,000 mg Q8H PRN    acetaminophen tablet 650 mg Q6H PRN    amLODIPine tablet 10 mg Daily    bisacodyL suppository 10 mg Daily PRN    calcium acetate(phosphat bind) capsule 1,334 mg TID WM    carvediloL tablet 25 mg BID    dextrose 10% bolus 125 mL 125 mL PRN    dextrose 10% bolus 250 mL 250 mL PRN    furosemide injection 80 mg Daily    glucagon (human recombinant) injection 1 mg PRN    glucose chewable tablet 16 g PRN    glucose chewable tablet 24 g PRN    [START ON 11/15/2023] heparin (porcine) injection 5,000 Units Q8H    hydrALAZINE tablet 25 mg Q8H PRN    hydrALAZINE tablet 75 mg Q8H    melatonin tablet 6 mg Nightly PRN    naloxone 0.4 mg/mL injection 0.02 mg PRN    ondansetron injection 4 mg Q8H PRN    polyethylene glycol packet 17 g BID PRN    senna tablet 8.6 mg Daily PRN    sodium bicarbonate tablet 1,300 mg TID    sodium chloride 0.9% flush 10 mL Q12H PRN    sodium chloride 0.9% flush 10 mL PRN    sodium zirconium cyclosilicate packet 10 g Daily     Family History    None       Tobacco Use    Smoking status: Former     Types: Cigarettes    Smokeless tobacco: Never    Tobacco comments:     quit "long time ago"   Substance and Sexual Activity    Alcohol use: Yes     Alcohol/week: 1.0 standard drink of alcohol     Types: 1 Cans of beer per week     Comment: social    Drug use: Never    Sexual activity: Yes     Partners: Female     Review of Systems  Objective:     Vital Signs (Most Recent):  Temp: 98.3 °F (36.8 °C) (11/13/23 0721)  Pulse: 72 (11/13/23 0721)  Resp: 16 (11/13/23 0721)  BP: (!) 150/68 " "(11/13/23 0721)  SpO2: (!) 93 % (11/13/23 0721) Vital Signs (24h Range):  Temp:  [98.1 °F (36.7 °C)-98.7 °F (37.1 °C)] 98.3 °F (36.8 °C)  Pulse:  [66-72] 72  Resp:  [16-19] 16  SpO2:  [93 %-95 %] 93 %  BP: (143-160)/(65-73) 150/68     Weight: 112.1 kg (247 lb 2.2 oz) (11/13/23 0505)  Body mass index is 38.7 kg/m².  Body surface area is 2.3 meters squared.    I/O last 3 completed shifts:  In: 960 [P.O.:960]  Out: 1930 [Urine:1930]    Physical Exam  HENT:      Head: Normocephalic.      Mouth/Throat:      Mouth: Mucous membranes are moist.   Cardiovascular:      Rate and Rhythm: Normal rate.      Pulses: Normal pulses.   Pulmonary:      Effort: Pulmonary effort is normal.   Musculoskeletal:      Cervical back: Normal range of motion.      Right lower leg: Edema present.      Left lower leg: Edema present.   Skin:     General: Skin is warm.   Neurological:      General: No focal deficit present.      Mental Status: He is alert.   Psychiatric:         Mood and Affect: Mood normal.          Significant Labs:  ABGs:   Recent Labs   Lab 11/09/23 2136   PH 7.469*   PCO2 37.7   HCO3 27.4   POCSATURATED 97   BE 4*       BMP:   Recent Labs   Lab 11/13/23  0608   GLU 94      K 4.1      CO2 27   BUN 86*   CREATININE 11.4*   CALCIUM 8.5*   MG 2.1       Cardiac Markers: No results for input(s): "CKMB", "TROPONINT", "MYOGLOBIN" in the last 168 hours.  CBC:   Recent Labs   Lab 11/12/23  0704   WBC 7.37   RBC 2.91*   HGB 7.8*   HCT 24.9*      MCV 86   MCH 26.8*   MCHC 31.3*       CMP:   Recent Labs   Lab 11/13/23  0608   GLU 94   CALCIUM 8.5*   ALBUMIN 2.9*   PROT 5.9*      K 4.1   CO2 27      BUN 86*   CREATININE 11.4*   ALKPHOS 58   ALT 16   AST 14   BILITOT 0.4       Coagulation:   Recent Labs   Lab 11/12/23  0704   INR 1.1   APTT 27.7       LFTs:   Recent Labs   Lab 11/13/23  0608   ALT 16   AST 14   ALKPHOS 58   BILITOT 0.4   PROT 5.9*   ALBUMIN 2.9*       Microbiology Results (last 7 days)       " ** No results found for the last 168 hours. **          Specimen (24h ago, onward)      None            Assessment/Plan:     Renal/  CKD (chronic kidney disease), stage V  Baseline GFR 4  Suspect etiology to be chronic uncontrolled DM    11/13  - total intake 840, UOP in 12 hr 1.390, total output 550  Net negative 550  - scr we have from 10/20 level of 10.49 trend up to 10/30 with level of 12.87  Then from 11/09 scr level 11.3 -11.5-11.8-11.4    Plan  Electrolyte Non-Emergent, will continue to monitore renal function and need for dialysis  Plan for tunneled Cath today, Give DDAVP 0.3,   - will give 1 UNIT OF PRBCs with dialysis       -Referred by outpatient Dr for CKD education, KTxp evaluation, AVF placement (not placed )  -admit to [facility] for HD initiation  -on sodium bicarb 1300mg, lokelma 10g ,   -Tunneled plan on Monday  - avoid nephrotoxic        Thank you for your consult. I will follow-up with patient. Please contact us if you have any additional questions.    Masoud Isaac MD  Nephrology  [facility]

## 2023-11-13 NOTE — OP NOTE
[facility] - Short Stay Cardiac Unit  Operative Note    Date of Procedure: 11/13/2023     Procedure: Procedure(s) (LRB):  Insertion, Catheter, Central Venous, Hemodialysis (Right)     Wai Bain  1970  [id number]    Pre-op Diagnosis: CKD (chronic kidney disease), stage V [N18.5]   Post-op Diagnosis: CKD (chronic kidney disease), stage V [N18.5]       Rt. IJ  cuffed tunneled catheter under local anesthesia with sedation under anesthesia. With real time ultrasound and fluoroscopy.     The patient was placed supine.    The Rt IJ was localized with US.    The skin was sterilized with Chlorhexidine.    2% Xylocaine with Epinephrine was given to anesthetize the skin and subcutaneous tissues.    The Rt, IJ was punctured under real time ultrasound.  A guide wire was inserted and left.     The tunneled tract was anesthetized with 2% xylocaine with Epinephrine.    The catheter was tunneled under the skin.   The sheath was introduced in the SVC under fluoroscopy after  multiple sequential dilatations performed over the guide wire.   The catheter is inserted inside the sheath after removing the guidewire and the dilater.   The catheter position is confirmed with the fluoroscopy with the tip in the Rt. Atrium. The curve is checked and no angulation in the tract was noticed.    Flushing and blood suction were checked from both catheter ports.    1000IU/ml  Heparin was inserted according to the catheter dead space.   The wound was sutured.    No bleeding was noticed after observation.   Biopatch was placed over the wound and covered with Tegaderm.    Pressure dressing with rolled up 4 X4 gauze placed over the tunnel.    The procedure was smooth with no complications.        Complications: No  Condition: Good  FOLLOWUP: In patient      The procedure is done under real time fluoroscopy.     You can use the catheter for dialysis after the observation period.      CHANDLER JOYNER.Lauryn. MD. WADE. JAQUELIN.  ,  [employer] [facility] [employer]  Nephrology Consultant. [facility].   [address],  [facility]. [address]th [place] [address].    email: [email or web address].  Tel: Office: [phone]

## 2023-11-13 NOTE — NURSING TRANSFER
Nursing Transfer Note      11/13/2023   2:13 PM    Nurse giving handoff:Kristin  Nurse receiving handoff:Guille    Reason patient is being transferred: return to room post procedure    Transfer To: [id number]    Transfer via stretcher    Transfer with cardiac monitoring    Transported by patient transport    Telemetry: Rhythm NSR  Order for Tele Monitor? Yes      Any special needs or follow-up needed: bedrest until 1845    Patient belongings transferred with patient:  n/a    Chart send with patient: Yes

## 2023-11-13 NOTE — PLAN OF CARE
[facility]  Discharge Reassessment    Primary Care Provider: Abhi Peralta MD    Expected Discharge Date: 11/15/2023    Reassessment (most recent)       Discharge Reassessment - 11/13/23 1447          Discharge Reassessment    Assessment Type Discharge Planning Reassessment     Communicated [place] with patient/caregiver Date not available/Unable to determine     Discharge Plan A Home     Discharge Plan B Home with family     DME Needed Upon Discharge  other (see comments)   TBD    Why the patient remains in the hospital Requires continued medical care        Post-Acute Status    Post-Acute Authorization Dialysis     Diaylsis Status Set-up Complete/Auth obtained   The patient has been set-up with [facility]  Tuesdays, Thursdays, and Saturdays.                    Per MD's Note, Interval History:   No events overnight. Hgb 7.1. One unit RBC ordered to be held in preparation for procedure with interventional nephrology. No complaints      Discharge Plan A  home and Plan B home with family have been determined by review of patient's clinical status, future medical and therapeutic needs, and coverage/benefits for post-acute care in coordination with multidisciplinary team members.      Cecilio Plascencia LMSW  Case Management [facility]

## 2023-11-13 NOTE — PLAN OF CARE
AAOx4. VSS. Patient is Czech speaking, but understands some english - Ipad  in use at bedside as needed. Patient up independently in room and bathroom. 80mg lasix IVP given x1 this shift - patient voiding clear yellow urine in urinal - see flowsheet for exact UOP amount. Patient NPO since midnight for HD cath placement - R IJ permcath placed today 11/13 - heparin-locked. Pressure dressing applied to site - told to keep on x24hrs and patient to maintain on bed rest till 1900 tonight - urinal provided at bedside. Diet restarted - renal diet w/ 1.2L FR - patient is compliant. H/H 7.1/23.5 (down from 7.8/24.9) - 1 unit PRBCs ordered to be transfused w/ HD - no times yet for HD. Non-skid socks on. Bed in low position. Call light within reach.

## 2023-11-13 NOTE — SUBJECTIVE & OBJECTIVE
Interval History:   No events overnight. Hgb 7.1. One unit RBC ordered to be held in preparation for procedure with interventional nephrology. No complaints.       Review of Systems   Constitutional:  Negative for activity change, appetite change, chills, diaphoresis, fatigue and fever.   HENT:  Negative for rhinorrhea and sore throat.    Respiratory:  Negative for cough, chest tightness and shortness of breath.    Cardiovascular:  Negative for chest pain, palpitations and leg swelling.   Gastrointestinal:  Negative for abdominal pain, constipation, diarrhea and nausea.   Endocrine: Negative for cold intolerance.   Genitourinary:  Negative for decreased urine volume and dysuria.   Musculoskeletal:  Negative for arthralgias and myalgias.   Skin:  Negative for rash and wound.   Neurological:  Negative for dizziness, weakness, numbness and headaches.   Psychiatric/Behavioral:  Negative for agitation, behavioral problems and confusion.      Objective:     Vital Signs (Most Recent):  Temp: 98.3 °F (36.8 °C) (11/13/23 0721)  Pulse: 66 (11/13/23 1100)  Resp: 16 (11/13/23 0721)  BP: (!) 150/68 (11/13/23 0721)  SpO2: (!) 93 % (11/13/23 0721) Vital Signs (24h Range):  Temp:  [98.1 °F (36.7 °C)-98.7 °F (37.1 °C)] 98.3 °F (36.8 °C)  Pulse:  [66-72] 66  Resp:  [16-18] 16  SpO2:  [93 %-95 %] 93 %  BP: (143-158)/(65-73) 150/68     Weight: 112.1 kg (247 lb 2.2 oz)  Body mass index is 38.7 kg/m².    Intake/Output Summary (Last 24 hours) at 11/13/2023 1147  Last data filed at 11/13/2023 0907  Gross per 24 hour   Intake 720 ml   Output 1790 ml   Net -1070 ml         Physical Exam  Vitals and nursing note reviewed.   Constitutional:       General: He is sleeping. He is not in acute distress.     Appearance: He is obese. He is not toxic-appearing or diaphoretic.   HENT:      Head: Normocephalic and atraumatic.      Nose: Nose normal.      Mouth/Throat:      Mouth: Mucous membranes are moist.   Eyes:      Extraocular Movements:  Extraocular movements intact.   Cardiovascular:      Rate and Rhythm: Normal rate and regular rhythm.      Pulses: Normal pulses.      Heart sounds: No murmur heard.  Pulmonary:      Effort: Pulmonary effort is normal. No respiratory distress.      Breath sounds: No wheezing, rhonchi or rales.   Abdominal:      General: Bowel sounds are normal. There is no distension.      Palpations: Abdomen is soft.      Tenderness: There is no abdominal tenderness. There is no guarding.   Musculoskeletal:         General: No tenderness or deformity. Normal range of motion.      Cervical back: Normal range of motion.      Right lower leg: Edema (Trace) present.      Left lower leg: Edema (Trace) present.   Skin:     General: Skin is warm and dry.      Capillary Refill: Capillary refill takes less than 2 seconds.   Neurological:      General: No focal deficit present.      Mental Status: He is oriented to person, place, and time and easily aroused.      Sensory: No sensory deficit.      Motor: No weakness.   Psychiatric:         Mood and Affect: Mood normal.         Behavior: Behavior normal.             Significant Labs: All pertinent labs within the past 24 hours have been reviewed.  CBC:   Recent Labs   Lab 11/12/23  0704 11/13/23  0608   WBC 7.37 6.96   HGB 7.8* 7.1*   HCT 24.9* 23.5*    146*     CMP:   Recent Labs   Lab 11/12/23  0704 11/13/23  0608    142   K 4.2 4.1    103   CO2 27 27    94   BUN 88* 86*   CREATININE 11.4* 11.4*   CALCIUM 8.3* 8.5*   PROT 6.3 5.9*   ALBUMIN 3.1* 2.9*   BILITOT 0.4 0.4   ALKPHOS 58 58   AST 19 14   ALT 21 16   ANIONGAP 12 12       Significant Imaging: I have reviewed all pertinent imaging results/findings within the past 24 hours.

## 2023-11-13 NOTE — SUBJECTIVE & OBJECTIVE
Past Medical History:   Diagnosis Date    CKD (chronic kidney disease), stage V 10/21/2023    Diabetes mellitus     Diabetes mellitus, type 2     H/O esophagogastroduodenoscopy     Hypertension     Urinary tract infection     History       Past Surgical History:   Procedure Laterality Date    ESOPHAGOGASTRODUODENOSCOPY N/A 03/13/2020    Procedure: EGD (ESOPHAGOGASTRODUODENOSCOPY);  Surgeon: Katiuska العراقي MD;  Location: [facility] ENDO;  Service: Endoscopy;  Laterality: N/A;    LAPAROSCOPIC CHOLECYSTECTOMY N/A 06/22/2020    Procedure: CHOLECYSTECTOMY, LAPAROSCOPIC;  Surgeon: Dayron Conner MD;  Location: [facility] OR;  Service: General;  Laterality: N/A;       Review of patient's allergies indicates:  No Known Allergies  Current Facility-Administered Medications   Medication Frequency    0.9%  NaCl infusion (for blood administration) Q24H PRN    acetaminophen tablet 1,000 mg Q8H PRN    acetaminophen tablet 650 mg Q6H PRN    amLODIPine tablet 10 mg Daily    bisacodyL suppository 10 mg Daily PRN    calcium acetate(phosphat bind) capsule 1,334 mg TID WM    carvediloL tablet 25 mg BID    dextrose 10% bolus 125 mL 125 mL PRN    dextrose 10% bolus 250 mL 250 mL PRN    furosemide injection 80 mg Daily    glucagon (human recombinant) injection 1 mg PRN    glucose chewable tablet 16 g PRN    glucose chewable tablet 24 g PRN    [START ON 11/15/2023] heparin (porcine) injection 5,000 Units Q8H    hydrALAZINE tablet 25 mg Q8H PRN    hydrALAZINE tablet 75 mg Q8H    melatonin tablet 6 mg Nightly PRN    naloxone 0.4 mg/mL injection 0.02 mg PRN    ondansetron injection 4 mg Q8H PRN    polyethylene glycol packet 17 g BID PRN    senna tablet 8.6 mg Daily PRN    sodium bicarbonate tablet 1,300 mg TID    sodium chloride 0.9% flush 10 mL Q12H PRN    sodium chloride 0.9% flush 10 mL PRN    sodium zirconium cyclosilicate packet 10 g Daily     Family History    None       Tobacco Use    Smoking status: Former     Types: Cigarettes    Smokeless  "tobacco: Never    Tobacco comments:     quit "long time ago"   Substance and Sexual Activity    Alcohol use: Yes     Alcohol/week: 1.0 standard drink of alcohol     Types: 1 Cans of beer per week     Comment: social    Drug use: Never    Sexual activity: Yes     Partners: Female     Review of Systems  Objective:     Vital Signs (Most Recent):  Temp: 98.3 °F (36.8 °C) (11/13/23 0721)  Pulse: 72 (11/13/23 0721)  Resp: 16 (11/13/23 0721)  BP: (!) 150/68 (11/13/23 0721)  SpO2: (!) 93 % (11/13/23 0721) Vital Signs (24h Range):  Temp:  [98.1 °F (36.7 °C)-98.7 °F (37.1 °C)] 98.3 °F (36.8 °C)  Pulse:  [66-72] 72  Resp:  [16-19] 16  SpO2:  [93 %-95 %] 93 %  BP: (143-160)/(65-73) 150/68     Weight: 112.1 kg (247 lb 2.2 oz) (11/13/23 0505)  Body mass index is 38.7 kg/m².  Body surface area is 2.3 meters squared.    I/O last 3 completed shifts:  In: 960 [P.O.:960]  Out: 1930 [Urine:1930]     Physical Exam  HENT:      Head: Normocephalic.      Mouth/Throat:      Mouth: Mucous membranes are moist.   Cardiovascular:      Rate and Rhythm: Normal rate.      Pulses: Normal pulses.   Pulmonary:      Effort: Pulmonary effort is normal.   Musculoskeletal:      Cervical back: Normal range of motion.      Right lower leg: Edema present.      Left lower leg: Edema present.   Skin:     General: Skin is warm.   Neurological:      General: No focal deficit present.      Mental Status: He is alert.   Psychiatric:         Mood and Affect: Mood normal.          Significant Labs:  ABGs:   Recent Labs   Lab 11/09/23 2136   PH 7.469*   PCO2 37.7   HCO3 27.4   POCSATURATED 97   BE 4*       BMP:   Recent Labs   Lab 11/13/23  0608   GLU 94      K 4.1      CO2 27   BUN 86*   CREATININE 11.4*   CALCIUM 8.5*   MG 2.1       Cardiac Markers: No results for input(s): "CKMB", "TROPONINT", "MYOGLOBIN" in the last 168 hours.  CBC:   Recent Labs   Lab 11/12/23  0704   WBC 7.37   RBC 2.91*   HGB 7.8*   HCT 24.9*      MCV 86   MCH 26.8*   MCHC " 31.3*       CMP:   Recent Labs   Lab 11/13/23  0608   GLU 94   CALCIUM 8.5*   ALBUMIN 2.9*   PROT 5.9*      K 4.1   CO2 27      BUN 86*   CREATININE 11.4*   ALKPHOS 58   ALT 16   AST 14   BILITOT 0.4       Coagulation:   Recent Labs   Lab 11/12/23  0704   INR 1.1   APTT 27.7       LFTs:   Recent Labs   Lab 11/13/23  0608   ALT 16   AST 14   ALKPHOS 58   BILITOT 0.4   PROT 5.9*   ALBUMIN 2.9*       Microbiology Results (last 7 days)       ** No results found for the last 168 hours. **          Specimen (24h ago, onward)      None

## 2023-11-13 NOTE — PLAN OF CARE
PLAN OF CARE NOTE     Fall bundle in place. Pt. Remained free from falls/rauma/injuries. No complaints of CP/ SOB/discomfort. VSS. A&Ox4. Tele, NSR. RA. Pt. denies pain this shift. Pt. NPO since midnight pending tunneled cath placement for HD. The POC reviewed w/ pt. & pts' spouse @ bedside, questions were answered & encouraged. No further concerns at this time.

## 2023-11-13 NOTE — PLAN OF CARE
Received report from Tonny. Patient s/p HD cath insertion, AAOx3. VSS, no c/o pain or discomfort at this time, resp even and unlabored. Gauze/tegaderm/pressure dressing to R upper chest/neck is CDI. No active bleeding. No hematoma noted. Post procedure protocol reviewed with patient. Understanding verbalized. Nurse call bell within reach. Will continue to monitor per post procedure protocol.

## 2023-11-13 NOTE — PATIENT CARE CONFERENCE
HEMODIALYSIS CATHETER CARE  Patients' instruction list      Hemodialysis catheter is one of the major vascular access to provide hemodialysis. Infection is one of its common complication. To maintain a safe and long-term use of your catheter without the requirement of replacement and exchange please follow the care instruction for your catheter:    Avoid getting water on the catheter. It is highly recommended to keep the catheter dry. During bathing use only sponging with towel around the catheter area. Lower part of the body can be washed but avoid splashing water over the catheter.     Avoid scratching or touching the skin close to the catheter.     It is recommended to change the dressing during dialysis sessions only. If you find your catheter dressing is wet or not clean, please call your dialysis unit to arrange exchange as soon as possible.     If you notice pain, redness or discharge from the exit site of the catheter please call your dialysis unit or your Nephrologist immediately to arrange examining the catheter and excluding any early signs of infection.     Your dialysis catheter should only be used for dialysis. Only your dialysis nurse can use the catheter. Do not accept any other health care personnel to use your hemodialysis catheter for any reason. This catheter is not intended to be used for medications, IV fluid or taking blood for labs.     The more strict you are in following the instructions the less will be the risk of infection and complications with your dialysis catheter.    We would like to wish you a great health and we will be glad to answer any question you have.      please communicate with [facility] or use [facility] to send us your questions.       CHANDLER JOYNER.Lauryn. MD. WADE. JAQUELIN.  , [employer] / [employer]  Nephrology Consultant. [facility].   [address],  [facility]. [address]th  [place] [address].    email: [email or web address].  Tel: Office: [phone]

## 2023-11-13 NOTE — PROGRESS NOTES
"[facility]  Progress Note    Patient Name: Wai Bain  MRN: [id number]  Patient Class: IP- Inpatient   Admission Date: 11/9/2023  Length of Stay: 2 days  Attending Physician: Ivan Hall MD  Primary Care Provider: Abhi Peralta MD        Subjective:     Principal Problem:Fluid overload        HPI:  Wai Bain is a 53 y.o. male with a PMHx of DM2, HTN, CKD5, and anemia who presents to the ED with complaints of shortness of breath. Per pt and his wife he is followed by nephrology outpatient. He has an appointment tomorrow for HD tunneled line placement. His wife reports he became acutely short of breath today noting he has had intermittent CAT for several months. Pt endorses associated BLE edema, fatigue, abdominal distention, non productive cough, 15lb weight gain x1 month, nausea, and decreased UO. He notes intermittent lightheadedness/ head "fullness" with exertion. The patient's wife reports he was hospitalized about 3 weeks ago at [facility] for anemia and elevated blood pressure. She states his blood pressure have been much better controlled since the addition of several antihypertensives. The patient denies fever, chills, CP, abdominal pain, vomiting, diarrhea, or dysuria.    In the ED, VSS, afebrile. CBC with stable anemia. Cr 11.3 (consistent with CKD5). Calcium 8.3. Albumin 3.3. . Troponin 0.093. EKG with SR, 67 bpm, no ST elevation or depression. CXR reveals pulmonary edema. The patient received 80mg IV lasix.    Overview/Hospital Course:  No notes on file    Interval History:   No events overnight. Hgb 7.1. One unit RBC ordered to be held in preparation for procedure with interventional nephrology. No complaints.       Review of Systems   Constitutional:  Negative for activity change, appetite change, chills, diaphoresis, fatigue and fever.   HENT:  Negative for rhinorrhea and sore throat.    Respiratory:  Negative for cough, chest " tightness and shortness of breath.    Cardiovascular:  Negative for chest pain, palpitations and leg swelling.   Gastrointestinal:  Negative for abdominal pain, constipation, diarrhea and nausea.   Endocrine: Negative for cold intolerance.   Genitourinary:  Negative for decreased urine volume and dysuria.   Musculoskeletal:  Negative for arthralgias and myalgias.   Skin:  Negative for rash and wound.   Neurological:  Negative for dizziness, weakness, numbness and headaches.   Psychiatric/Behavioral:  Negative for agitation, behavioral problems and confusion.      Objective:     Vital Signs (Most Recent):  Temp: 98.3 °F (36.8 °C) (11/13/23 0721)  Pulse: 66 (11/13/23 1100)  Resp: 16 (11/13/23 0721)  BP: (!) 150/68 (11/13/23 0721)  SpO2: (!) 93 % (11/13/23 0721) Vital Signs (24h Range):  Temp:  [98.1 °F (36.7 °C)-98.7 °F (37.1 °C)] 98.3 °F (36.8 °C)  Pulse:  [66-72] 66  Resp:  [16-18] 16  SpO2:  [93 %-95 %] 93 %  BP: (143-158)/(65-73) 150/68     Weight: 112.1 kg (247 lb 2.2 oz)  Body mass index is 38.7 kg/m².    Intake/Output Summary (Last 24 hours) at 11/13/2023 1147  Last data filed at 11/13/2023 0907  Gross per 24 hour   Intake 720 ml   Output 1790 ml   Net -1070 ml         Physical Exam  Vitals and nursing note reviewed.   Constitutional:       General: He is sleeping. He is not in acute distress.     Appearance: He is obese. He is not toxic-appearing or diaphoretic.   HENT:      Head: Normocephalic and atraumatic.      Nose: Nose normal.      Mouth/Throat:      Mouth: Mucous membranes are moist.   Eyes:      Extraocular Movements: Extraocular movements intact.   Cardiovascular:      Rate and Rhythm: Normal rate and regular rhythm.      Pulses: Normal pulses.      Heart sounds: No murmur heard.  Pulmonary:      Effort: Pulmonary effort is normal. No respiratory distress.      Breath sounds: No wheezing, rhonchi or rales.   Abdominal:      General: Bowel sounds are normal. There is no distension.      Palpations:  Abdomen is soft.      Tenderness: There is no abdominal tenderness. There is no guarding.   Musculoskeletal:         General: No tenderness or deformity. Normal range of motion.      Cervical back: Normal range of motion.      Right lower leg: Edema (Trace) present.      Left lower leg: Edema (Trace) present.   Skin:     General: Skin is warm and dry.      Capillary Refill: Capillary refill takes less than 2 seconds.   Neurological:      General: No focal deficit present.      Mental Status: He is oriented to person, place, and time and easily aroused.      Sensory: No sensory deficit.      Motor: No weakness.   Psychiatric:         Mood and Affect: Mood normal.         Behavior: Behavior normal.             Significant Labs: All pertinent labs within the past 24 hours have been reviewed.  CBC:   Recent Labs   Lab 11/12/23  0704 11/13/23  0608   WBC 7.37 6.96   HGB 7.8* 7.1*   HCT 24.9* 23.5*    146*     CMP:   Recent Labs   Lab 11/12/23  0704 11/13/23  0608    142   K 4.2 4.1    103   CO2 27 27    94   BUN 88* 86*   CREATININE 11.4* 11.4*   CALCIUM 8.3* 8.5*   PROT 6.3 5.9*   ALBUMIN 3.1* 2.9*   BILITOT 0.4 0.4   ALKPHOS 58 58   AST 19 14   ALT 21 16   ANIONGAP 12 12       Significant Imaging: I have reviewed all pertinent imaging results/findings within the past 24 hours.    Assessment/Plan:      * Fluid overload  Per pt and his wife he is followed by nephrology outpatient. He has an appointment tomorrow for HD tunneled line placement. His wife reports he became acutely short of breath today noting he has had intermittent CAT for several months. Pt endorses associated BLE edema, abdominal distention, cough, 15lb weight gain x1 month, nausea, and decreased UO.    -   - Trop peaked at 0.093 likely due to volume OL, patient denied CP on admit  - CXR with pulmonary edema.  - Pt received 80mg IV lasix in the ED and repeat on arrival to the floor  - TTE 11/10: EF 60%, CVp 8, PASP 47,  mild MR, TR  - Nephrology consulted  -- IV Lasix 80mg daily   -- Initiation of HD post line placement  - Fluid restriction  - Strict I&Os, daily weight.      CKD (chronic kidney disease), stage V  Creatine stable on admission. BMP reviewed- noted Estimated Creatinine Clearance: 9 mL/min (A) (based on SCr of 11.4 mg/dL (H)). according to latest data. Based on current GFR, CKD stage is stage 5 - GFR < 15.     - Nephrology consulted  -- To start initiation of HD inpatient that initially being setup as outpatient prior to admission  -- Interventional nephrology consulted for HD line placement, planned 11/13  - Monitor UOP and serial BMP and adjust therapy as needed.   - Renally dose meds and avoid nephrotoxic medications    Hyperphosphatemia  - Phos 5.7 on admit labs, improved from previous admission.  - Continue phosphorus binders.  - Renal diet    Severe obesity (BMI >= 40)  Body mass index is 39.15 kg/m². Morbid obesity complicates all aspects of disease management from diagnostic modalities to treatment. Weight loss encouraged and health benefits explained to patient.    Anemia due to stage 5 chronic kidney disease, not on chronic dialysis  Patient's anemia is currently controlled. Has not received any PRBCs to date. Etiology likely d/t chronic disease due to Chronic Kidney Disease/ESRD  Current CBC reviewed-   Lab Results   Component Value Date    HGB 7.8 (L) 11/12/2023    HCT 24.9 (L) 11/12/2023     - Monitor serial CBC and transfuse as needed  - Goal Hgb >7  - Trend CBC  - Consented for blood.    Essential hypertension  Chronic, controlled. Latest blood pressure and vitals reviewed-     Temp:  [97.8 °F (36.6 °C)-98.2 °F (36.8 °C)]   Pulse:  [62-70]   Resp:  [14-20]   BP: (141-160)/(67-74)   SpO2:  [94 %-98 %] .   Home meds for hypertension were reviewed and noted below.   Hypertension Medications               amLODIPine (NORVASC) 10 MG tablet Take 1 tablet (10 mg total) by mouth once daily.    carvediloL (COREG)  25 MG tablet Take 1 tablet (25 mg total) by mouth 2 (two) times daily.    hydrALAZINE (APRESOLINE) 50 MG tablet Take 1 tablet (50 mg total) by mouth every 8 (eight) hours.            While in the hospital, will manage blood pressure as follows; Continue home antihypertensive regimen    Will utilize PRN. blood pressure medication only if patient's blood pressure greater than 160/100 and he develops symptoms such as worsening chest pain or shortness of breath.    Type 2 diabetes mellitus with hyperglycemia, without long-term current use of insulin  Patient is no longer on medications.   Last A1c reviewed-   Lab Results   Component Value Date    HGBA1C 5.6 10/21/2023         VTE Risk Mitigation (From admission, onward)           Ordered     heparin (porcine) injection 5,000 Units  Every 8 hours         11/12/23 1035     IP VTE HIGH RISK PATIENT  Once         11/09/23 1937     Place sequential compression device  Until discontinued         11/09/23 1937                    Discharge Planning   [place]: 11/15/2023     Code Status: Full Code   Is the patient medically ready for discharge?: No    Reason for patient still in hospital (select all that apply): Patient trending condition, Laboratory test, Treatment, Consult recommendations, and Pending disposition  Discharge Plan A: Home with family                  Ivan Hall MD  Department of Hospital Medicine   [facility] - Transplant Stepdown

## 2023-11-13 NOTE — ASSESSMENT & PLAN NOTE
Baseline GFR 4  Suspect etiology to be chronic uncontrolled DM    11/13  - total intake 840, UOP in 12 hr 1.390, total output 550  Net negative 550  - scr we have from 10/20 level of 10.49 trend up to 10/30 with level of 12.87  Then from 11/09 scr level 11.3 -11.5-11.8-11.4    Uop since last night 475 after 80mg lasix    Plan  Responding to lasix, please give lasix 80 mg today if he look volume up can increase the dose of lasix and monitor urine input output , will plan for dialysis accordingly  Obtain chest Xray  -Referred by outpatient Dr for CKD education, KTxp evaluation, AVF placement (not placed )  -admit to [facility] for HD initiation  -on sodium bicarb 1300mg, lokelma 10g ,   -Tunneled plan on Monday  - avoid nephrotoxic

## 2023-11-13 NOTE — INTERVAL H&P NOTE
The patient has been examined and the H&P has been reviewed:    I concur with the findings and no changes have occurred since H&P was written.    Procedure risks, benefits and alternative options discussed and understood by patient/family.          Active Hospital Problems    Diagnosis  POA    *Fluid overload [E87.70]  Yes    Hyperphosphatemia [E83.39]  Yes    Severe obesity (BMI >= 40) [E66.01]  Yes    CKD (chronic kidney disease), stage V [N18.5]  Yes    Anemia due to stage 5 chronic kidney disease, not on chronic dialysis [N18.5, D63.1]  Yes    Essential hypertension [I10]  Yes    Type 2 diabetes mellitus with hyperglycemia, without long-term current use of insulin [E11.65]  Yes      Resolved Hospital Problems   No resolved problems to display.

## 2023-11-14 LAB
ABO + RH BLD: NORMAL
ALBUMIN SERPL BCP-MCNC: 3 G/DL (ref 3.5–5.2)
ALP SERPL-CCNC: 53 U/L (ref 55–135)
ALT SERPL W/O P-5'-P-CCNC: 15 U/L (ref 10–44)
ANION GAP SERPL CALC-SCNC: 14 MMOL/L (ref 8–16)
AST SERPL-CCNC: 15 U/L (ref 10–40)
BASOPHILS # BLD AUTO: 0.02 K/UL (ref 0–0.2)
BASOPHILS NFR BLD: 0.3 % (ref 0–1.9)
BILIRUB SERPL-MCNC: 0.4 MG/DL (ref 0.1–1)
BLD GP AB SCN CELLS X3 SERPL QL: NORMAL
BLD PROD TYP BPU: NORMAL
BLOOD UNIT EXPIRATION DATE: NORMAL
BLOOD UNIT TYPE CODE: 5100
BLOOD UNIT TYPE: NORMAL
BUN SERPL-MCNC: 86 MG/DL (ref 6–20)
CALCIUM SERPL-MCNC: 8.4 MG/DL (ref 8.7–10.5)
CHLORIDE SERPL-SCNC: 103 MMOL/L (ref 95–110)
CO2 SERPL-SCNC: 26 MMOL/L (ref 23–29)
CODING SYSTEM: NORMAL
CREAT SERPL-MCNC: 12.2 MG/DL (ref 0.5–1.4)
CROSSMATCH INTERPRETATION: NORMAL
DIFFERENTIAL METHOD: ABNORMAL
DISPENSE STATUS: NORMAL
EOSINOPHIL # BLD AUTO: 0.4 K/UL (ref 0–0.5)
EOSINOPHIL NFR BLD: 5.1 % (ref 0–8)
ERYTHROCYTE [DISTWIDTH] IN BLOOD BY AUTOMATED COUNT: 13 % (ref 11.5–14.5)
EST. GFR  (NO RACE VARIABLE): 4.5 ML/MIN/1.73 M^2
GLUCOSE SERPL-MCNC: 106 MG/DL (ref 70–110)
HCT VFR BLD AUTO: 22 % (ref 40–54)
HGB BLD-MCNC: 6.7 G/DL (ref 14–18)
IMM GRANULOCYTES # BLD AUTO: 0.02 K/UL (ref 0–0.04)
IMM GRANULOCYTES NFR BLD AUTO: 0.3 % (ref 0–0.5)
LYMPHOCYTES # BLD AUTO: 0.8 K/UL (ref 1–4.8)
LYMPHOCYTES NFR BLD: 11.3 % (ref 18–48)
MAGNESIUM SERPL-MCNC: 2.1 MG/DL (ref 1.6–2.6)
MCH RBC QN AUTO: 26.5 PG (ref 27–31)
MCHC RBC AUTO-ENTMCNC: 30.5 G/DL (ref 32–36)
MCV RBC AUTO: 87 FL (ref 82–98)
MONOCYTES # BLD AUTO: 0.8 K/UL (ref 0.3–1)
MONOCYTES NFR BLD: 11.1 % (ref 4–15)
NEUTROPHILS # BLD AUTO: 4.9 K/UL (ref 1.8–7.7)
NEUTROPHILS NFR BLD: 71.9 % (ref 38–73)
NRBC BLD-RTO: 0 /100 WBC
NUM UNITS TRANS PACKED RBC: NORMAL
PHOSPHATE SERPL-MCNC: 5.5 MG/DL (ref 2.7–4.5)
PLATELET # BLD AUTO: 131 K/UL (ref 150–450)
PLATELET BLD QL SMEAR: ABNORMAL
PMV BLD AUTO: 9.9 FL (ref 9.2–12.9)
POTASSIUM SERPL-SCNC: 4.3 MMOL/L (ref 3.5–5.1)
PROT SERPL-MCNC: 5.9 G/DL (ref 6–8.4)
RBC # BLD AUTO: 2.53 M/UL (ref 4.6–6.2)
SODIUM SERPL-SCNC: 143 MMOL/L (ref 136–145)
SPECIMEN OUTDATE: NORMAL
WBC # BLD AUTO: 6.83 K/UL (ref 3.9–12.7)

## 2023-11-14 PROCEDURE — 99233 SBSQ HOSP IP/OBS HIGH 50: CPT | Mod: NTX,,, | Performed by: INTERNAL MEDICINE

## 2023-11-14 PROCEDURE — 84100 ASSAY OF PHOSPHORUS: CPT | Mod: NTX | Performed by: STUDENT IN AN ORGANIZED HEALTH CARE EDUCATION/TRAINING PROGRAM

## 2023-11-14 PROCEDURE — 80053 COMPREHEN METABOLIC PANEL: CPT | Mod: NTX | Performed by: STUDENT IN AN ORGANIZED HEALTH CARE EDUCATION/TRAINING PROGRAM

## 2023-11-14 PROCEDURE — 63600175 PHARM REV CODE 636 W HCPCS: Mod: NTX | Performed by: STUDENT IN AN ORGANIZED HEALTH CARE EDUCATION/TRAINING PROGRAM

## 2023-11-14 PROCEDURE — 25000003 PHARM REV CODE 250: Mod: NTX | Performed by: STUDENT IN AN ORGANIZED HEALTH CARE EDUCATION/TRAINING PROGRAM

## 2023-11-14 PROCEDURE — 86850 RBC ANTIBODY SCREEN: CPT | Mod: NTX | Performed by: STUDENT IN AN ORGANIZED HEALTH CARE EDUCATION/TRAINING PROGRAM

## 2023-11-14 PROCEDURE — 99233 PR SUBSEQUENT HOSPITAL CARE,LEVL III: ICD-10-PCS | Mod: NTX,,, | Performed by: INTERNAL MEDICINE

## 2023-11-14 PROCEDURE — 25000003 PHARM REV CODE 250: Mod: NTX | Performed by: NURSE PRACTITIONER

## 2023-11-14 PROCEDURE — 25000003 PHARM REV CODE 250: Mod: NTX

## 2023-11-14 PROCEDURE — 85025 COMPLETE CBC W/AUTO DIFF WBC: CPT | Mod: NTX | Performed by: STUDENT IN AN ORGANIZED HEALTH CARE EDUCATION/TRAINING PROGRAM

## 2023-11-14 PROCEDURE — 83735 ASSAY OF MAGNESIUM: CPT | Mod: NTX | Performed by: STUDENT IN AN ORGANIZED HEALTH CARE EDUCATION/TRAINING PROGRAM

## 2023-11-14 PROCEDURE — 20600001 HC STEP DOWN PRIVATE ROOM: Mod: NTX

## 2023-11-14 RX ORDER — HEPARIN SODIUM 1000 [USP'U]/ML
1000 INJECTION, SOLUTION INTRAVENOUS; SUBCUTANEOUS
Status: DISPENSED | OUTPATIENT
Start: 2023-11-14 | End: 2023-11-15

## 2023-11-14 RX ORDER — BENZONATATE 100 MG/1
100 CAPSULE ORAL 3 TIMES DAILY PRN
Status: DISCONTINUED | OUTPATIENT
Start: 2023-11-14 | End: 2023-11-15 | Stop reason: HOSPADM

## 2023-11-14 RX ORDER — SODIUM CHLORIDE 9 MG/ML
INJECTION, SOLUTION INTRAVENOUS ONCE
Status: DISCONTINUED | OUTPATIENT
Start: 2023-11-14 | End: 2023-11-15 | Stop reason: HOSPADM

## 2023-11-14 RX ADMIN — SODIUM BICARBONATE 650 MG TABLET 1300 MG: at 02:11

## 2023-11-14 RX ADMIN — HYDRALAZINE HYDROCHLORIDE 75 MG: 50 TABLET ORAL at 05:11

## 2023-11-14 RX ADMIN — CARVEDILOL 25 MG: 12.5 TABLET, FILM COATED ORAL at 08:11

## 2023-11-14 RX ADMIN — HYDRALAZINE HYDROCHLORIDE 75 MG: 50 TABLET ORAL at 09:11

## 2023-11-14 RX ADMIN — BENZONATATE 100 MG: 100 CAPSULE ORAL at 09:11

## 2023-11-14 RX ADMIN — SODIUM ZIRCONIUM CYCLOSILICATE 10 G: 5 POWDER, FOR SUSPENSION ORAL at 08:11

## 2023-11-14 RX ADMIN — BENZONATATE 100 MG: 100 CAPSULE ORAL at 02:11

## 2023-11-14 RX ADMIN — SODIUM BICARBONATE 650 MG TABLET 1300 MG: at 09:11

## 2023-11-14 RX ADMIN — AMLODIPINE BESYLATE 10 MG: 10 TABLET ORAL at 08:11

## 2023-11-14 RX ADMIN — FUROSEMIDE 80 MG: 10 INJECTION, SOLUTION INTRAVENOUS at 08:11

## 2023-11-14 RX ADMIN — CALCIUM ACETATE 1334 MG: 667 CAPSULE ORAL at 04:11

## 2023-11-14 RX ADMIN — CARVEDILOL 25 MG: 12.5 TABLET, FILM COATED ORAL at 09:11

## 2023-11-14 RX ADMIN — HYDRALAZINE HYDROCHLORIDE 75 MG: 50 TABLET ORAL at 02:11

## 2023-11-14 RX ADMIN — CALCIUM ACETATE 1334 MG: 667 CAPSULE ORAL at 08:11

## 2023-11-14 RX ADMIN — CALCIUM ACETATE 1334 MG: 667 CAPSULE ORAL at 11:11

## 2023-11-14 RX ADMIN — SODIUM BICARBONATE 650 MG TABLET 1300 MG: at 08:11

## 2023-11-14 NOTE — NURSING
Pt permcath line site leaking small amount of blood. I changed the dressing with the central line kit and placed surgicel at the insertion site. I also put the pressure band back on the site. I have notified dr luo on call with [facility]. She is fine with this and told me to keep an eye on the site and call again if needed for assistance.

## 2023-11-14 NOTE — ASSESSMENT & PLAN NOTE
Baseline GFR 4  Suspect etiology to be chronic uncontrolled DM    11/14  - total intake 580, UOP in 12 hr 1.350, total output 770  Net negative 3.4  - scr we have from 10/20 level of 10.49 trend up to 10/30 with level of 12.87  Then from 11/09 scr level 11.3 -11.5-11.8-11.4  Plan  - Pt permcath line site leaking small amount of blood. I changed the dressing , plan for iHD today  -Referred by outpatient Dr for CKD education, KTxp evaluation, AVF placement (not placed )  -admit to [facility] for HD initiation  -on sodium bicarb 1300mg, lokelma 10g ,   -Tunneled plan on Monday  - avoid nephrotoxic

## 2023-11-14 NOTE — SUBJECTIVE & OBJECTIVE
Interval History:   Tunnel HD line placed yesterday with bleeding overnight into the morning that was addressed by interventional nephrology. Plan for initial HD today. Patient with no complaints. Hgb 6.7 today. RBC transfusion with HD today.      Review of Systems   Constitutional:  Negative for activity change, appetite change, chills, diaphoresis, fatigue and fever.   HENT:  Negative for rhinorrhea and sore throat.    Respiratory:  Negative for cough, chest tightness and shortness of breath.    Cardiovascular:  Negative for chest pain, palpitations and leg swelling.   Gastrointestinal:  Negative for abdominal pain, constipation, diarrhea and nausea.   Endocrine: Negative for cold intolerance.   Genitourinary:  Negative for decreased urine volume and dysuria.   Musculoskeletal:  Negative for arthralgias and myalgias.   Skin:  Negative for rash and wound.   Neurological:  Negative for dizziness, weakness, numbness and headaches.   Psychiatric/Behavioral:  Negative for agitation, behavioral problems and confusion.      Objective:     Vital Signs (Most Recent):  Temp: 98.8 °F (37.1 °C) (11/14/23 1150)  Pulse: 72 (11/14/23 1150)  Resp: 18 (11/14/23 1150)  BP: (!) 154/72 (11/14/23 1150)  SpO2: 95 % (11/14/23 1150) Vital Signs (24h Range):  Temp:  [97.7 °F (36.5 °C)-98.8 °F (37.1 °C)] 98.8 °F (37.1 °C)  Pulse:  [66-73] 72  Resp:  [12-20] 18  SpO2:  [94 %-97 %] 95 %  BP: (124-154)/(57-90) 154/72     Weight: 113.7 kg (250 lb 10.6 oz)  Body mass index is 39.25 kg/m².    Intake/Output Summary (Last 24 hours) at 11/14/2023 1238  Last data filed at 11/14/2023 0937  Gross per 24 hour   Intake 820 ml   Output 950 ml   Net -130 ml         Physical Exam  Vitals and nursing note reviewed.   Constitutional:       General: He is sleeping. He is not in acute distress.     Appearance: He is obese. He is not toxic-appearing or diaphoretic.   HENT:      Head: Normocephalic and atraumatic.      Nose: Nose normal.      Mouth/Throat:       Mouth: Mucous membranes are moist.   Eyes:      Extraocular Movements: Extraocular movements intact.   Cardiovascular:      Rate and Rhythm: Normal rate and regular rhythm.      Pulses: Normal pulses.      Heart sounds: No murmur heard.  Pulmonary:      Effort: Pulmonary effort is normal. No respiratory distress.      Breath sounds: No wheezing, rhonchi or rales.   Abdominal:      General: Bowel sounds are normal. There is no distension.      Palpations: Abdomen is soft.      Tenderness: There is no abdominal tenderness. There is no guarding.   Musculoskeletal:         General: No tenderness or deformity. Normal range of motion.      Cervical back: Normal range of motion.      Right lower leg: Edema (Trace) present.      Left lower leg: Edema (Trace) present.   Skin:     General: Skin is warm and dry.      Capillary Refill: Capillary refill takes less than 2 seconds.   Neurological:      General: No focal deficit present.      Mental Status: He is oriented to person, place, and time and easily aroused.      Sensory: No sensory deficit.      Motor: No weakness.   Psychiatric:         Mood and Affect: Mood normal.         Behavior: Behavior normal.             Significant Labs: All pertinent labs within the past 24 hours have been reviewed.  CBC:   Recent Labs   Lab 11/13/23  0608 11/14/23  0550   WBC 6.96 6.83   HGB 7.1* 6.7*   HCT 23.5* 22.0*   * 131*     CMP:   Recent Labs   Lab 11/13/23  0608 11/14/23  0550    143   K 4.1 4.3    103   CO2 27 26   GLU 94 106   BUN 86* 86*   CREATININE 11.4* 12.2*   CALCIUM 8.5* 8.4*   PROT 5.9* 5.9*   ALBUMIN 2.9* 3.0*   BILITOT 0.4 0.4   ALKPHOS 58 53*   AST 14 15   ALT 16 15   ANIONGAP 12 14       Significant Imaging: I have reviewed all pertinent imaging results/findings within the past 24 hours.

## 2023-11-14 NOTE — ASSESSMENT & PLAN NOTE
Patient's anemia is currently controlled. Has not received any PRBCs to date. Etiology likely d/t chronic disease due to Chronic Kidney Disease/ESRD  Current CBC reviewed-   Lab Results   Component Value Date    HGB 6.7 (L) 11/14/2023    HCT 22.0 (L) 11/14/2023     - Monitor serial CBC and transfuse as needed  - Goal Hgb >7  - Trend CBC  - Consented for blood.

## 2023-11-14 NOTE — PROGRESS NOTES
"[facility] - Transplant Stepdown  Nephrology  Progress Note    Patient Name: Wai Bain  MRN: [id number]  Admission Date: 11/9/2023  Hospital Length of Stay: 3 days  Attending Provider: Ivan Hall MD   Primary Care Physician: Abhi Prealta MD  Principal Problem:Fluid overload    Subjective:     HPI: HPI: Wai Bain is a 53 y.o. male with a PMHx of DM2, HTN, CKD5, and anemia who presents to the ED with complaints of shortness of breath. Per pt and his wife he is followed by nephrology outpatient. He has an appointment tomorrow for HD tunneled line placement. His wife reports he became acutely short of breath today noting he has had intermittent CAT for several months. Pt endorses associated BLE edema, fatigue, abdominal distention, non productive cough, 15lb weight gain x1 month, nausea, and decreased UO. He notes intermittent lightheadedness/ head "fullness" with exertion. The patient's wife reports he was hospitalized about 3 weeks ago at [facility] for anemia and elevated blood pressure. She states his blood pressure have been much better controlled since the addition of several antihypertensives. The patient denies fever, chills, CP, abdominal pain, vomiting, diarrhea, or dysuria.     In the ED, VSS, afebrile. CBC with stable anemia. Cr 11.3 (consistent with CKD5). Calcium 8.3. Albumin 3.3. . Troponin 0.093. EKG with SR, 67 bpm, no ST elevation or depression. CXR reveals pulmonary edema. The patient received 80mg IV lasix    Past Medical History:   Diagnosis Date    CKD (chronic kidney disease), stage V 10/21/2023    Diabetes mellitus     Diabetes mellitus, type 2     H/O esophagogastroduodenoscopy     Hypertension     Urinary tract infection     History       Past Surgical History:   Procedure Laterality Date    ESOPHAGOGASTRODUODENOSCOPY N/A 03/13/2020    Procedure: EGD (ESOPHAGOGASTRODUODENOSCOPY);  Surgeon: Katiuska العراقي MD;  [facility]: [facility];  Service: Endoscopy;  " "Laterality: N/A;    LAPAROSCOPIC CHOLECYSTECTOMY N/A 06/22/2020    Procedure: CHOLECYSTECTOMY, LAPAROSCOPIC;  Surgeon: Dayron Conner MD;  Location: [facility];  Service: General;  Laterality: N/A;       Review of patient's allergies indicates:  No Known Allergies  Current Facility-Administered Medications   Medication Frequency    0.9%  NaCl infusion (for blood administration) Q24H PRN    0.9%  NaCl infusion (for blood administration) Q24H PRN    0.9%  NaCl infusion (for blood administration) Q24H PRN    0.9%  NaCl infusion Continuous PRN    0.9%  NaCl infusion Once    acetaminophen 1,000 mg/100 mL (10 mg/mL) injection Continuous PRN    acetaminophen tablet 1,000 mg Q8H PRN    acetaminophen tablet 650 mg Q6H PRN    amLODIPine tablet 10 mg Daily    benzonatate capsule 100 mg TID PRN    bisacodyL suppository 10 mg Daily PRN    calcium acetate(phosphat bind) capsule 1,334 mg TID WM    carvediloL tablet 25 mg BID    dextrose 10% bolus 125 mL 125 mL PRN    dextrose 10% bolus 250 mL 250 mL PRN    furosemide injection 80 mg Daily    glucagon (human recombinant) injection 1 mg PRN    glucose chewable tablet 16 g PRN    glucose chewable tablet 24 g PRN    heparin (porcine) injection 1,000 Units PRN    [START ON 11/15/2023] heparin (porcine) injection 5,000 Units Q8H    hydrALAZINE tablet 25 mg Q8H PRN    hydrALAZINE tablet 75 mg Q8H    melatonin tablet 6 mg Nightly PRN    naloxone 0.4 mg/mL injection 0.02 mg PRN    ondansetron injection 4 mg Q8H PRN    polyethylene glycol packet 17 g BID PRN    senna tablet 8.6 mg Daily PRN    sodium bicarbonate tablet 1,300 mg TID    sodium chloride 0.9% flush 10 mL Q12H PRN    sodium chloride 0.9% flush 10 mL PRN    sodium zirconium cyclosilicate packet 10 g Daily     Family History    None       Tobacco Use    Smoking status: Former     Types: Cigarettes    Smokeless tobacco: Never    Tobacco comments:     quit "long time ago"   Substance and Sexual Activity    Alcohol use: Yes     " "Alcohol/week: 1.0 standard drink of alcohol     Types: 1 Cans of beer per week     Comment: social    Drug use: Never    Sexual activity: Yes     Partners: Female     Review of Systems  Objective:     Vital Signs (Most Recent):  Temp: 98.2 °F (36.8 °C) (11/14/23 0759)  Pulse: 71 (11/14/23 0759)  Resp: 18 (11/14/23 0445)  BP: (!) 152/72 (11/14/23 0759)  SpO2: (!) 94 % (11/14/23 0759) Vital Signs (24h Range):  Temp:  [97.7 °F (36.5 °C)-98.7 °F (37.1 °C)] 98.2 °F (36.8 °C)  Pulse:  [66-71] 71  Resp:  [12-20] 18  SpO2:  [94 %-97 %] 94 %  BP: (124-152)/(57-90) 152/72     Weight: 113.7 kg (250 lb 10.6 oz) (11/14/23 0400)  Body mass index is 39.25 kg/m².  Body surface area is 2.32 meters squared.    I/O last 3 completed shifts:  In: 820 [P.O.:720; IV Piggyback:100]  Out: 1990 [Urine:1990]    Physical Exam  HENT:      Head: Normocephalic.      Mouth/Throat:      Mouth: Mucous membranes are moist.   Cardiovascular:      Rate and Rhythm: Normal rate.      Pulses: Normal pulses.   Pulmonary:      Effort: Pulmonary effort is normal.   Musculoskeletal:      Cervical back: Normal range of motion.      Right lower leg: Edema present.      Left lower leg: Edema present.   Skin:     General: Skin is warm.   Neurological:      General: No focal deficit present.      Mental Status: He is alert.   Psychiatric:         Mood and Affect: Mood normal.          Significant Labs:  ABGs:   Recent Labs   Lab 11/09/23 2136   PH 7.469*   PCO2 37.7   HCO3 27.4   POCSATURATED 97   BE 4*       BMP:   Recent Labs   Lab 11/14/23  0550         K 4.3      CO2 26   BUN 86*   CREATININE 12.2*   CALCIUM 8.4*   MG 2.1       Cardiac Markers: No results for input(s): "CKMB", "TROPONINT", "MYOGLOBIN" in the last 168 hours.  CBC:   Recent Labs   Lab 11/14/23  0550   WBC 6.83   RBC 2.53*   HGB 6.7*   HCT 22.0*   *   MCV 87   MCH 26.5*   MCHC 30.5*       CMP:   Recent Labs   Lab 11/14/23  0550      CALCIUM 8.4*   ALBUMIN 3.0* "   PROT 5.9*      K 4.3   CO2 26      BUN 86*   CREATININE 12.2*   ALKPHOS 53*   ALT 15   AST 15   BILITOT 0.4       Coagulation:   Recent Labs   Lab 11/12/23  0704   INR 1.1   APTT 27.7       LFTs:   Recent Labs   Lab 11/14/23  0550   ALT 15   AST 15   ALKPHOS 53*   BILITOT 0.4   PROT 5.9*   ALBUMIN 3.0*       Microbiology Results (last 7 days)       ** No results found for the last 168 hours. **          Specimen (24h ago, onward)      None            Assessment/Plan:     Renal/  CKD (chronic kidney disease), stage V  Baseline GFR 4  Suspect etiology to be chronic uncontrolled DM    11/14  - total intake 580, UOP in 12 hr 1.350, total output 770  Net negative 3.4  - scr we have from 10/20 level of 10.49 trend up to 10/30 with level of 12.87  Then from 11/09 scr level of 11.3 -11.5-11.8-11.4  Plan  - Pt permcath line site leaking small amount of blood. -changed the dressing , plan for iHD today for metabolic clearance with No UF, he will receive 1 unit PRBCs with iHD  - recommend to encourage PO intake  - will follow outpatient dialysis at [facility]   -Referred by outpatient Dr for AVF placement (not placed )   -admit to [facility] for HD initiation  -on sodium bicarb 1300mg, lokelma 10g ,   - avoid nephrotoxic      Hyperphosphatemia  - Phos 5.7on admit labs, improved to 5.5  from previous admission.  - Continue phosphorus binders.  - Renal diet      Thank you for your consult. I will follow-up with patient. Please contact us if you have any additional questions.    Masoud Isaac MD  Nephrology  [facility] - Transplant Stepdown

## 2023-11-14 NOTE — PROGRESS NOTES
MIGUELINA contacted [facility] ([phone]). MIGUELINA updated Stephanie that pt is still in hospital and will not make chair time today. Pt is now anticipated to start outpt dialysis at [facility] Thursday (11/16/2023).    MIGUELINA will continue to follow with updates.    Carolynn Cooper LMSW  [facility]  [phone]

## 2023-11-14 NOTE — CARE UPDATE
Interventional Nephrology:    I was called this morning because of oozing of blood from the catheter exit site.     The catheter dressing was removed by the nurse. The exit site was cleaned by alcohol swabs. A 2% lidocaine with Epinephrine injection was injected around the tunnel. Pressure was held for 2 min. There were no more bleeding coming from the site.     Biopatch was applied to the exit site with pressure dressing.    Please keep the patient head elevated 45 degrees and complete bed rest for 6 hours.     Keep the pressure dressing for 24 hours.    CHANDLER JOYNER.Lauryn. MD. WADE. JAQUELIN.  , [employer] [facility] [employer]  Nephrology Consultant. [facility].   [address] [place] [address]. 5th [place] [address].    email: [email or web address].  Tel: Office: [phone]

## 2023-11-14 NOTE — SUBJECTIVE & OBJECTIVE
Past Medical History:   Diagnosis Date    CKD (chronic kidney disease), stage V 10/21/2023    Diabetes mellitus     Diabetes mellitus, type 2     H/O esophagogastroduodenoscopy     Hypertension     Urinary tract infection     History       Past Surgical History:   Procedure Laterality Date    ESOPHAGOGASTRODUODENOSCOPY N/A 03/13/2020    Procedure: EGD (ESOPHAGOGASTRODUODENOSCOPY);  Surgeon: Katiuska العراقي MD;  Location: [facility] ENDO;  Service: Endoscopy;  Laterality: N/A;    LAPAROSCOPIC CHOLECYSTECTOMY N/A 06/22/2020    Procedure: CHOLECYSTECTOMY, LAPAROSCOPIC;  Surgeon: Dayron Conner MD;  Location: [facility] OR;  Service: General;  Laterality: N/A;       Review of patient's allergies indicates:  No Known Allergies  Current Facility-Administered Medications   Medication Frequency    0.9%  NaCl infusion (for blood administration) Q24H PRN    0.9%  NaCl infusion (for blood administration) Q24H PRN    0.9%  NaCl infusion (for blood administration) Q24H PRN    0.9%  NaCl infusion Continuous PRN    0.9%  NaCl infusion Once    acetaminophen 1,000 mg/100 mL (10 mg/mL) injection Continuous PRN    acetaminophen tablet 1,000 mg Q8H PRN    acetaminophen tablet 650 mg Q6H PRN    amLODIPine tablet 10 mg Daily    benzonatate capsule 100 mg TID PRN    bisacodyL suppository 10 mg Daily PRN    calcium acetate(phosphat bind) capsule 1,334 mg TID WM    carvediloL tablet 25 mg BID    dextrose 10% bolus 125 mL 125 mL PRN    dextrose 10% bolus 250 mL 250 mL PRN    furosemide injection 80 mg Daily    glucagon (human recombinant) injection 1 mg PRN    glucose chewable tablet 16 g PRN    glucose chewable tablet 24 g PRN    heparin (porcine) injection 1,000 Units PRN    [START ON 11/15/2023] heparin (porcine) injection 5,000 Units Q8H    hydrALAZINE tablet 25 mg Q8H PRN    hydrALAZINE tablet 75 mg Q8H    melatonin tablet 6 mg Nightly PRN    naloxone 0.4 mg/mL injection 0.02 mg PRN    ondansetron injection 4 mg Q8H PRN    polyethylene glycol  "packet 17 g BID PRN    senna tablet 8.6 mg Daily PRN    sodium bicarbonate tablet 1,300 mg TID    sodium chloride 0.9% flush 10 mL Q12H PRN    sodium chloride 0.9% flush 10 mL PRN    sodium zirconium cyclosilicate packet 10 g Daily     Family History    None       Tobacco Use    Smoking status: Former     Types: Cigarettes    Smokeless tobacco: Never    Tobacco comments:     quit "long time ago"   Substance and Sexual Activity    Alcohol use: Yes     Alcohol/week: 1.0 standard drink of alcohol     Types: 1 Cans of beer per week     Comment: social    Drug use: Never    Sexual activity: Yes     Partners: Female     Review of Systems  Objective:     Vital Signs (Most Recent):  Temp: 98.2 °F (36.8 °C) (11/14/23 0759)  Pulse: 71 (11/14/23 0759)  Resp: 18 (11/14/23 0445)  BP: (!) 152/72 (11/14/23 0759)  SpO2: (!) 94 % (11/14/23 0759) Vital Signs (24h Range):  Temp:  [97.7 °F (36.5 °C)-98.7 °F (37.1 °C)] 98.2 °F (36.8 °C)  Pulse:  [66-71] 71  Resp:  [12-20] 18  SpO2:  [94 %-97 %] 94 %  BP: (124-152)/(57-90) 152/72     Weight: 113.7 kg (250 lb 10.6 oz) (11/14/23 0400)  Body mass index is 39.25 kg/m².  Body surface area is 2.32 meters squared.    I/O last 3 completed shifts:  In: 820 [P.O.:720; IV Piggyback:100]  Out: 1990 [Urine:1990]     Physical Exam  HENT:      Head: Normocephalic.      Mouth/Throat:      Mouth: Mucous membranes are moist.   Cardiovascular:      Rate and Rhythm: Normal rate.      Pulses: Normal pulses.   Pulmonary:      Effort: Pulmonary effort is normal.   Musculoskeletal:      Cervical back: Normal range of motion.      Right lower leg: Edema present.      Left lower leg: Edema present.   Skin:     General: Skin is warm.   Neurological:      General: No focal deficit present.      Mental Status: He is alert.   Psychiatric:         Mood and Affect: Mood normal.          Significant Labs:  ABGs:   Recent Labs   Lab 11/09/23 2136   PH 7.469*   PCO2 37.7   HCO3 27.4   POCSATURATED 97   BE 4*       BMP: " "  Recent Labs   Lab 11/14/23  0550         K 4.3      CO2 26   BUN 86*   CREATININE 12.2*   CALCIUM 8.4*   MG 2.1       Cardiac Markers: No results for input(s): "CKMB", "TROPONINT", "MYOGLOBIN" in the last 168 hours.  CBC:   Recent Labs   Lab 11/14/23  0550   WBC 6.83   RBC 2.53*   HGB 6.7*   HCT 22.0*   *   MCV 87   MCH 26.5*   MCHC 30.5*       CMP:   Recent Labs   Lab 11/14/23  0550      CALCIUM 8.4*   ALBUMIN 3.0*   PROT 5.9*      K 4.3   CO2 26      BUN 86*   CREATININE 12.2*   ALKPHOS 53*   ALT 15   AST 15   BILITOT 0.4       Coagulation:   Recent Labs   Lab 11/12/23  0704   INR 1.1   APTT 27.7       LFTs:   Recent Labs   Lab 11/14/23  0550   ALT 15   AST 15   ALKPHOS 53*   BILITOT 0.4   PROT 5.9*   ALBUMIN 3.0*       Microbiology Results (last 7 days)       ** No results found for the last 168 hours. **          Specimen (24h ago, onward)      None            "

## 2023-11-14 NOTE — PLAN OF CARE
Pt has wife at bedside. He is able to speak enough english to communicate with me. The ipad is at the bedside for language barrier.  Pt on tele monitoring and renal diet.  Pt has 1 unit PRBCs ordered with HD.  He has a pressure dressing that is to remain on pt til tomorrow.  Pt is on strict I&Os with lasix iv push during the day.

## 2023-11-14 NOTE — ASSESSMENT & PLAN NOTE
Per pt and his wife he is followed by nephrology outpatient. He has an appointment tomorrow for HD tunneled line placement. His wife reports he became acutely short of breath today noting he has had intermittent CAT for several months. Pt endorses associated BLE edema, abdominal distention, cough, 15lb weight gain x1 month, nausea, and decreased UO.    -   - Trop peaked at 0.093 likely due to volume OL, patient denied CP on admit  - CXR with pulmonary edema.  - Pt received 80mg IV lasix in the ED and repeat on arrival to the floor  - TTE 11/10: EF 60%, CVp 8, PASP 47, mild MR, TR  - Nephrology consulted  -- IV Lasix 80mg daily   -- HD per nephrology   - Fluid restriction  - Strict I&Os, daily weight.

## 2023-11-14 NOTE — PROGRESS NOTES
"[facility] - Transplant [facility] Medicine  Progress Note    Patient Name: Wai Bain  MRN: [id number]  Patient Class: IP- Inpatient   Admission Date: 11/9/2023  Length of Stay: 3 days  Attending Physician: Ivan Hall MD  Primary Care Provider: Abhi Peralta MD        Subjective:     Principal Problem:Fluid overload        HPI:  Wai Bain is a 53 y.o. male with a PMHx of DM2, HTN, CKD5, and anemia who presents to the ED with complaints of shortness of breath. Per pt and his wife he is followed by nephrology outpatient. He has an appointment tomorrow for HD tunneled line placement. His wife reports he became acutely short of breath today noting he has had intermittent CAT for several months. Pt endorses associated BLE edema, fatigue, abdominal distention, non productive cough, 15lb weight gain x1 month, nausea, and decreased UO. He notes intermittent lightheadedness/ head "fullness" with exertion. The patient's wife reports he was hospitalized about 3 weeks ago at [facility] for anemia and elevated blood pressure. She states his blood pressure have been much better controlled since the addition of several antihypertensives. The patient denies fever, chills, CP, abdominal pain, vomiting, diarrhea, or dysuria.    In the ED, VSS, afebrile. CBC with stable anemia. Cr 11.3 (consistent with CKD5). Calcium 8.3. Albumin 3.3. . Troponin 0.093. EKG with SR, 67 bpm, no ST elevation or depression. CXR reveals pulmonary edema. The patient received 80mg IV lasix.    Overview/Hospital Course:  No notes on file    Interval History:   Tunnel HD line placed yesterday with bleeding overnight into the morning that was addressed by interventional nephrology. Plan for initial HD today. Patient with no complaints. Hgb 6.7 today. RBC transfusion with HD today.      Review of Systems   Constitutional:  Negative for activity change, appetite change, chills, diaphoresis, fatigue and fever.   HENT:  " Negative for rhinorrhea and sore throat.    Respiratory:  Negative for cough, chest tightness and shortness of breath.    Cardiovascular:  Negative for chest pain, palpitations and leg swelling.   Gastrointestinal:  Negative for abdominal pain, constipation, diarrhea and nausea.   Endocrine: Negative for cold intolerance.   Genitourinary:  Negative for decreased urine volume and dysuria.   Musculoskeletal:  Negative for arthralgias and myalgias.   Skin:  Negative for rash and wound.   Neurological:  Negative for dizziness, weakness, numbness and headaches.   Psychiatric/Behavioral:  Negative for agitation, behavioral problems and confusion.      Objective:     Vital Signs (Most Recent):  Temp: 98.8 °F (37.1 °C) (11/14/23 1150)  Pulse: 72 (11/14/23 1150)  Resp: 18 (11/14/23 1150)  BP: (!) 154/72 (11/14/23 1150)  SpO2: 95 % (11/14/23 1150) Vital Signs (24h Range):  Temp:  [97.7 °F (36.5 °C)-98.8 °F (37.1 °C)] 98.8 °F (37.1 °C)  Pulse:  [66-73] 72  Resp:  [12-20] 18  SpO2:  [94 %-97 %] 95 %  BP: (124-154)/(57-90) 154/72     Weight: 113.7 kg (250 lb 10.6 oz)  Body mass index is 39.25 kg/m².    Intake/Output Summary (Last 24 hours) at 11/14/2023 1238  Last data filed at 11/14/2023 0937  Gross per 24 hour   Intake 820 ml   Output 950 ml   Net -130 ml         Physical Exam  Vitals and nursing note reviewed.   Constitutional:       General: He is sleeping. He is not in acute distress.     Appearance: He is obese. He is not toxic-appearing or diaphoretic.   HENT:      Head: Normocephalic and atraumatic.      Nose: Nose normal.      Mouth/Throat:      Mouth: Mucous membranes are moist.   Eyes:      Extraocular Movements: Extraocular movements intact.   Cardiovascular:      Rate and Rhythm: Normal rate and regular rhythm.      Pulses: Normal pulses.      Heart sounds: No murmur heard.  Pulmonary:      Effort: Pulmonary effort is normal. No respiratory distress.      Breath sounds: No wheezing, rhonchi or rales.   Abdominal:       General: Bowel sounds are normal. There is no distension.      Palpations: Abdomen is soft.      Tenderness: There is no abdominal tenderness. There is no guarding.   Musculoskeletal:         General: No tenderness or deformity. Normal range of motion.      Cervical back: Normal range of motion.      Right lower leg: Edema (Trace) present.      Left lower leg: Edema (Trace) present.   Skin:     General: Skin is warm and dry.      Capillary Refill: Capillary refill takes less than 2 seconds.   Neurological:      General: No focal deficit present.      Mental Status: He is oriented to person, place, and time and easily aroused.      Sensory: No sensory deficit.      Motor: No weakness.   Psychiatric:         Mood and Affect: Mood normal.         Behavior: Behavior normal.             Significant Labs: All pertinent labs within the past 24 hours have been reviewed.  CBC:   Recent Labs   Lab 11/13/23  0608 11/14/23  0550   WBC 6.96 6.83   HGB 7.1* 6.7*   HCT 23.5* 22.0*   * 131*     CMP:   Recent Labs   Lab 11/13/23  0608 11/14/23  0550    143   K 4.1 4.3    103   CO2 27 26   GLU 94 106   BUN 86* 86*   CREATININE 11.4* 12.2*   CALCIUM 8.5* 8.4*   PROT 5.9* 5.9*   ALBUMIN 2.9* 3.0*   BILITOT 0.4 0.4   ALKPHOS 58 53*   AST 14 15   ALT 16 15   ANIONGAP 12 14       Significant Imaging: I have reviewed all pertinent imaging results/findings within the past 24 hours.    Assessment/Plan:      * Fluid overload  Per pt and his wife he is followed by nephrology outpatient. He has an appointment tomorrow for HD tunneled line placement. His wife reports he became acutely short of breath today noting he has had intermittent CAT for several months. Pt endorses associated BLE edema, abdominal distention, cough, 15lb weight gain x1 month, nausea, and decreased UO.    -   - Trop peaked at 0.093 likely due to volume OL, patient denied CP on admit  - CXR with pulmonary edema.  - Pt received 80mg IV lasix in the  ED and repeat on arrival to the floor  - TTE 11/10: EF 60%, CVp 8, PASP 47, mild MR, TR  - Nephrology consulted  -- IV Lasix 80mg daily   -- HD per nephrology   - Fluid restriction  - Strict I&Os, daily weight.      CKD (chronic kidney disease), stage V  Creatine stable on admission. BMP reviewed- noted Estimated Creatinine Clearance: 8.4 mL/min (A) (based on SCr of 12.2 mg/dL (H)). according to latest data. Based on current GFR, CKD stage is stage 5 - GFR < 15.     - Nephrology consulted  -- To start initiation of HD inpatient that initially being setup as outpatient prior to admission  -- Interventional nephrology placed HD line on 11/13, c/b bleeding post procedure   -- Initial HD 11/14  - Monitor UOP and serial BMP and adjust therapy as needed.   - Renally dose meds and avoid nephrotoxic medications    Hyperphosphatemia  - Phos 5.7 on admit labs, improved from previous admission.  - Continue phosphorus binders.  - Renal diet    Severe obesity (BMI >= 40)  Body mass index is 39.15 kg/m². Morbid obesity complicates all aspects of disease management from diagnostic modalities to treatment. Weight loss encouraged and health benefits explained to patient.    Anemia due to stage 5 chronic kidney disease, not on chronic dialysis  Patient's anemia is currently controlled. Has not received any PRBCs to date. Etiology likely d/t chronic disease due to Chronic Kidney Disease/ESRD  Current CBC reviewed-   Lab Results   Component Value Date    HGB 6.7 (L) 11/14/2023    HCT 22.0 (L) 11/14/2023     - Monitor serial CBC and transfuse as needed  - Goal Hgb >7  - Trend CBC  - Consented for blood.    Essential hypertension  Chronic, controlled. Latest blood pressure and vitals reviewed-     Temp:  [97.8 °F (36.6 °C)-98.2 °F (36.8 °C)]   Pulse:  [62-70]   Resp:  [14-20]   BP: (141-160)/(67-74)   SpO2:  [94 %-98 %] .   Home meds for hypertension were reviewed and noted below.   Hypertension Medications               amLODIPine  (NORVASC) 10 MG tablet Take 1 tablet (10 mg total) by mouth once daily.    carvediloL (COREG) 25 MG tablet Take 1 tablet (25 mg total) by mouth 2 (two) times daily.    hydrALAZINE (APRESOLINE) 50 MG tablet Take 1 tablet (50 mg total) by mouth every 8 (eight) hours.            While in the hospital, will manage blood pressure as follows; Continue home antihypertensive regimen    Will utilize PRN. blood pressure medication only if patient's blood pressure greater than 160/100 and he develops symptoms such as worsening chest pain or shortness of breath.    Type 2 diabetes mellitus with hyperglycemia, without long-term current use of insulin  Patient is no longer on medications.   Last A1c reviewed-   Lab Results   Component Value Date    HGBA1C 5.6 10/21/2023         VTE Risk Mitigation (From admission, onward)           Ordered     heparin (porcine) injection 5,000 Units  Every 8 hours         11/12/23 1035     heparin (porcine) injection 1,000 Units  As needed (PRN)         11/14/23 0825     IP VTE HIGH RISK PATIENT  Once         11/09/23 1937     Place sequential compression device  Until discontinued         11/09/23 1937                    Discharge Planning   [place]: 11/17/2023     Code Status: Full Code   Is the patient medically ready for discharge?: No    Reason for patient still in hospital (select all that apply): Patient trending condition, Laboratory test, Treatment, and Pending disposition  Discharge Plan A: Home                  Ivan Hall MD  Department of Hospital Medicine   [facility] - Transplant Stepdown

## 2023-11-14 NOTE — PLAN OF CARE
AAOx4. VSS. Patient is Vietnamese speaking, but understands some english - Ipad  in use as needed. Patient up independently in room and bathroom. Patient voiding clear yellow urine in urinal - see flowsheet for exact UOP amount. Upon initially walking into patient's room this AM patient bleeding from R IJ permcath that was placed yesterday 11/13 - heparin-locked. Pressure dressing removed and pressure held at bedside. Dr Galloway who placed line called and came to bedside to assess - Site cleaned and pressure held - lidocaine injected and guaze/occlusive dressing applied x2. Pressure dressing re-applied and verbal order to leave on x24hrs. H/H this AM 6.7/22.0 (down from 7.1/23.5 prior) - team aware - 1unit PRBCs ordered to be transfused during HD. HD planned for tonight at bedside. Cr 12.2 (increased from 11.4 prior). Non-skid socks on. Bed in low position. Call light within reach.

## 2023-11-14 NOTE — ASSESSMENT & PLAN NOTE
Creatine stable on admission. BMP reviewed- noted Estimated Creatinine Clearance: 8.4 mL/min (A) (based on SCr of 12.2 mg/dL (H)). according to latest data. Based on current GFR, CKD stage is stage 5 - GFR < 15.     - Nephrology consulted  -- To start initiation of HD inpatient that initially being setup as outpatient prior to admission  -- Interventional nephrology placed HD line on 11/13, c/b bleeding post procedure   -- Initial HD 11/14  - Monitor UOP and serial BMP and adjust therapy as needed.   - Renally dose meds and avoid nephrotoxic medications

## 2023-11-15 VITALS
BODY MASS INDEX: 39.35 KG/M2 | SYSTOLIC BLOOD PRESSURE: 128 MMHG | TEMPERATURE: 99 F | DIASTOLIC BLOOD PRESSURE: 59 MMHG | HEART RATE: 67 BPM | OXYGEN SATURATION: 96 % | RESPIRATION RATE: 20 BRPM | WEIGHT: 250.69 LBS | HEIGHT: 67 IN

## 2023-11-15 LAB
ALBUMIN SERPL BCP-MCNC: 3.2 G/DL (ref 3.5–5.2)
ALP SERPL-CCNC: 59 U/L (ref 55–135)
ALT SERPL W/O P-5'-P-CCNC: 15 U/L (ref 10–44)
ANION GAP SERPL CALC-SCNC: 10 MMOL/L (ref 8–16)
AST SERPL-CCNC: 17 U/L (ref 10–40)
BASOPHILS # BLD AUTO: 0.03 K/UL (ref 0–0.2)
BASOPHILS NFR BLD: 0.4 % (ref 0–1.9)
BILIRUB SERPL-MCNC: 0.6 MG/DL (ref 0.1–1)
BLD PROD TYP BPU: NORMAL
BLOOD UNIT EXPIRATION DATE: NORMAL
BLOOD UNIT TYPE CODE: 5100
BLOOD UNIT TYPE: NORMAL
BUN SERPL-MCNC: 48 MG/DL (ref 6–20)
CALCIUM SERPL-MCNC: 9.1 MG/DL (ref 8.7–10.5)
CHLORIDE SERPL-SCNC: 106 MMOL/L (ref 95–110)
CO2 SERPL-SCNC: 26 MMOL/L (ref 23–29)
CODING SYSTEM: NORMAL
CREAT SERPL-MCNC: 7.3 MG/DL (ref 0.5–1.4)
CROSSMATCH INTERPRETATION: NORMAL
DIFFERENTIAL METHOD: ABNORMAL
DISPENSE STATUS: NORMAL
EOSINOPHIL # BLD AUTO: 0.4 K/UL (ref 0–0.5)
EOSINOPHIL NFR BLD: 4.7 % (ref 0–8)
ERYTHROCYTE [DISTWIDTH] IN BLOOD BY AUTOMATED COUNT: 13 % (ref 11.5–14.5)
EST. GFR  (NO RACE VARIABLE): 8.3 ML/MIN/1.73 M^2
GLUCOSE SERPL-MCNC: 89 MG/DL (ref 70–110)
HCT VFR BLD AUTO: 26.4 % (ref 40–54)
HGB BLD-MCNC: 8.4 G/DL (ref 14–18)
IMM GRANULOCYTES # BLD AUTO: 0.02 K/UL (ref 0–0.04)
IMM GRANULOCYTES NFR BLD AUTO: 0.3 % (ref 0–0.5)
LYMPHOCYTES # BLD AUTO: 0.8 K/UL (ref 1–4.8)
LYMPHOCYTES NFR BLD: 10.4 % (ref 18–48)
MAGNESIUM SERPL-MCNC: 2 MG/DL (ref 1.6–2.6)
MCH RBC QN AUTO: 27.2 PG (ref 27–31)
MCHC RBC AUTO-ENTMCNC: 31.8 G/DL (ref 32–36)
MCV RBC AUTO: 85 FL (ref 82–98)
MONOCYTES # BLD AUTO: 1 K/UL (ref 0.3–1)
MONOCYTES NFR BLD: 12.7 % (ref 4–15)
NEUTROPHILS # BLD AUTO: 5.7 K/UL (ref 1.8–7.7)
NEUTROPHILS NFR BLD: 71.5 % (ref 38–73)
NRBC BLD-RTO: 0 /100 WBC
NUM UNITS TRANS PACKED RBC: NORMAL
PHOSPHATE SERPL-MCNC: 3 MG/DL (ref 2.7–4.5)
PLATELET # BLD AUTO: 144 K/UL (ref 150–450)
PMV BLD AUTO: 10.2 FL (ref 9.2–12.9)
POTASSIUM SERPL-SCNC: 4 MMOL/L (ref 3.5–5.1)
PROT SERPL-MCNC: 6.5 G/DL (ref 6–8.4)
RBC # BLD AUTO: 3.09 M/UL (ref 4.6–6.2)
SODIUM SERPL-SCNC: 142 MMOL/L (ref 136–145)
WBC # BLD AUTO: 7.9 K/UL (ref 3.9–12.7)

## 2023-11-15 PROCEDURE — 25000003 PHARM REV CODE 250: Mod: NTX | Performed by: STUDENT IN AN ORGANIZED HEALTH CARE EDUCATION/TRAINING PROGRAM

## 2023-11-15 PROCEDURE — P9016 RBC LEUKOCYTES REDUCED: HCPCS | Mod: NTX | Performed by: STUDENT IN AN ORGANIZED HEALTH CARE EDUCATION/TRAINING PROGRAM

## 2023-11-15 PROCEDURE — 63600175 PHARM REV CODE 636 W HCPCS: Mod: NTX | Performed by: STUDENT IN AN ORGANIZED HEALTH CARE EDUCATION/TRAINING PROGRAM

## 2023-11-15 PROCEDURE — 99233 PR SUBSEQUENT HOSPITAL CARE,LEVL III: ICD-10-PCS | Mod: NTX,,, | Performed by: INTERNAL MEDICINE

## 2023-11-15 PROCEDURE — 84100 ASSAY OF PHOSPHORUS: CPT | Mod: NTX | Performed by: STUDENT IN AN ORGANIZED HEALTH CARE EDUCATION/TRAINING PROGRAM

## 2023-11-15 PROCEDURE — 99233 SBSQ HOSP IP/OBS HIGH 50: CPT | Mod: NTX,,, | Performed by: INTERNAL MEDICINE

## 2023-11-15 PROCEDURE — 63600175 PHARM REV CODE 636 W HCPCS: Mod: NTX

## 2023-11-15 PROCEDURE — 80100014 HC HEMODIALYSIS 1:1: Mod: NTX

## 2023-11-15 PROCEDURE — 36415 COLL VENOUS BLD VENIPUNCTURE: CPT | Mod: NTX | Performed by: STUDENT IN AN ORGANIZED HEALTH CARE EDUCATION/TRAINING PROGRAM

## 2023-11-15 PROCEDURE — 86920 COMPATIBILITY TEST SPIN: CPT | Mod: NTX | Performed by: STUDENT IN AN ORGANIZED HEALTH CARE EDUCATION/TRAINING PROGRAM

## 2023-11-15 PROCEDURE — 80053 COMPREHEN METABOLIC PANEL: CPT | Mod: NTX | Performed by: STUDENT IN AN ORGANIZED HEALTH CARE EDUCATION/TRAINING PROGRAM

## 2023-11-15 PROCEDURE — 83735 ASSAY OF MAGNESIUM: CPT | Mod: NTX | Performed by: STUDENT IN AN ORGANIZED HEALTH CARE EDUCATION/TRAINING PROGRAM

## 2023-11-15 PROCEDURE — 25000003 PHARM REV CODE 250: Mod: NTX | Performed by: NURSE PRACTITIONER

## 2023-11-15 PROCEDURE — 85025 COMPLETE CBC W/AUTO DIFF WBC: CPT | Mod: NTX | Performed by: STUDENT IN AN ORGANIZED HEALTH CARE EDUCATION/TRAINING PROGRAM

## 2023-11-15 RX ORDER — BENZONATATE 100 MG/1
100 CAPSULE ORAL 3 TIMES DAILY PRN
Qty: 15 CAPSULE | Refills: 1 | Status: SHIPPED | OUTPATIENT
Start: 2023-11-15 | End: 2023-11-25

## 2023-11-15 RX ADMIN — CALCIUM ACETATE 1334 MG: 667 CAPSULE ORAL at 08:11

## 2023-11-15 RX ADMIN — HYDRALAZINE HYDROCHLORIDE 75 MG: 50 TABLET ORAL at 01:11

## 2023-11-15 RX ADMIN — AMLODIPINE BESYLATE 10 MG: 10 TABLET ORAL at 08:11

## 2023-11-15 RX ADMIN — SODIUM BICARBONATE 650 MG TABLET 1300 MG: at 08:11

## 2023-11-15 RX ADMIN — CARVEDILOL 25 MG: 12.5 TABLET, FILM COATED ORAL at 08:11

## 2023-11-15 RX ADMIN — ACETAMINOPHEN 650 MG: 325 TABLET ORAL at 06:11

## 2023-11-15 RX ADMIN — SODIUM ZIRCONIUM CYCLOSILICATE 10 G: 5 POWDER, FOR SUSPENSION ORAL at 08:11

## 2023-11-15 RX ADMIN — HEPARIN SODIUM 1000 UNITS: 1000 INJECTION, SOLUTION INTRAVENOUS; SUBCUTANEOUS at 05:11

## 2023-11-15 RX ADMIN — ACETAMINOPHEN 650 MG: 325 TABLET ORAL at 11:11

## 2023-11-15 RX ADMIN — HYDRALAZINE HYDROCHLORIDE 75 MG: 50 TABLET ORAL at 06:11

## 2023-11-15 RX ADMIN — SODIUM BICARBONATE 650 MG TABLET 1300 MG: at 02:11

## 2023-11-15 RX ADMIN — CALCIUM ACETATE 1334 MG: 667 CAPSULE ORAL at 11:11

## 2023-11-15 RX ADMIN — FUROSEMIDE 80 MG: 10 INJECTION, SOLUTION INTRAVENOUS at 08:11

## 2023-11-15 NOTE — PLAN OF CARE
[facility] - Transplant Stepdown  Discharge Final Note    Primary Care Provider: Abhi Peralta MD    Expected Discharge Date: 11/15/2023    Final Discharge Note (most recent)       Final Note - 11/15/23 1327          Final Note    Assessment Type Final Discharge Note     Anticipated Discharge Disposition Home or Self Care     Hospital Resources/Appts/Education Provided Provided patient/caregiver with written discharge plan information   Per bedside nurse       Post-Acute Status    Post-Acute Authorization Dialysis     Diaylsis Status Set-up Complete/Auth obtained     Discharge Delays None known at this time                     Important Message from Medicare         Pt is expected to dc home today. Pt accepted at [facility] for HD. T/Th/S at 2pm chair schedule. No further post acute needs at this time.      Savana Vargas, RN    [facility]  [phone]

## 2023-11-15 NOTE — PROGRESS NOTES
MIGUELINA contacted [facility] to update clinic on pt's discharge plan. MIGUELINA spoke to Danisha and informed that pt will discharge today. Danisha VU and confirmed pt is anticipated to start outpt HD at [facility] tomorrow.     MIGUELINA spoke with pt's spouse via phone and updated regarding pt's outpt dialysis schedule. MIGUELINA provided [facility] address and number. Miguelina informed pt will need to arrive at [facility] tomorrow at 1:30PM to complete initial paperwork. Pt's spouse VU. All questions addressed. No other concerns identified.    Carolynn Cooper, LEIF  [facility]  [phone]

## 2023-11-15 NOTE — PLAN OF CARE
Patient discharged from TSU to home w/ wife at side. Patient ambulated off unit to car independently. R IJ permcath pressure dressing removed. Site cleaned new occlusive dressing applied. No bleeding noted from site. Foam dressings applied to R shoulder and middle chest from blisters/skin tears d/t frequent dressing changes. PIV removed prior to discharge. AVS provided to patient and reviewed w/ patient and wife - verbalized understanding. No questions asked. Patient to  discharge medications downstairs. Mask on patient.

## 2023-11-15 NOTE — PROGRESS NOTES
11/15/23 0545   Vital Signs   Temp 98.2 °F (36.8 °C)   Temp Source Oral   Pulse 69   Heart Rate Source Monitor   Resp 15   Flow (L/min) 2   Device (Oxygen Therapy) nasal cannula   BP (!) 159/78   MAP (mmHg) 112   BP Location Left arm   BP Method Automatic   Patient Position Lying     HD tx completed and pt tolerated well.  Transfused 1unit of PRBC and no transfusion reaction noted.  No fluid removed per order.  Tx time; 3hrs.  Pt is alert and stable.

## 2023-11-15 NOTE — PROGRESS NOTES
11/15/23 0240   Vital Signs   Temp 98.7 °F (37.1 °C)   Temp Source Oral   Pulse 69   Heart Rate Source Monitor   Resp 16   Device (Oxygen Therapy) room air   BP (!) 156/75   MAP (mmHg) 108   BP Location Left arm   BP Method Automatic   Patient Position Lying     Bedside HD 1:1 tx initiated aseptically via RIJ TDC.  Pt is alert and oriented.  POC discussed with the pt and mother.  Stable for tx.

## 2023-11-15 NOTE — ASSESSMENT & PLAN NOTE
Baseline GFR 4  Suspect etiology to be chronic uncontrolled DM    11/14  - total intake 580, UOP in 12 hr 1.350, total output 770  Net negative 3.4  - scr we have from 10/20 level of 10.49 trend up to 10/30 with level of 12.87  Then from 11/09 scr level 11.3 -11.5-11.8-11.4  Plan  - iHD 11/14 was able to tolerate it x 3hr , No UF, will acess further need for ihd  -Referred by outpatient Dr for CKD education, KTxp evaluation, AVF placement (not placed )  -admit to [facility] for [facility] initiation  -on sodium bicarb 1300mg, lokelma 10g ,   -Tunneled plan on Monday  - avoid nephrotoxic

## 2023-11-15 NOTE — NURSING
Patient R permacath was leaking a small amount of blood. MD on call Michelle Lainez notified. Will apply pressure to the site per MD.ADAN

## 2023-11-15 NOTE — SUBJECTIVE & OBJECTIVE
Interval History:   Patient seen and examined , iHD yesterday was able to tolerate it well      Review of patient's allergies indicates:  No Known Allergies  Current Facility-Administered Medications   Medication Frequency    0.9%  NaCl infusion (for blood administration) Q24H PRN    0.9%  NaCl infusion (for blood administration) Q24H PRN    0.9%  NaCl infusion (for blood administration) Q24H PRN    0.9%  NaCl infusion Continuous PRN    0.9%  NaCl infusion Once    acetaminophen 1,000 mg/100 mL (10 mg/mL) injection Continuous PRN    acetaminophen tablet 1,000 mg Q8H PRN    acetaminophen tablet 650 mg Q6H PRN    amLODIPine tablet 10 mg Daily    benzonatate capsule 100 mg TID PRN    bisacodyL suppository 10 mg Daily PRN    calcium acetate(phosphat bind) capsule 1,334 mg TID WM    carvediloL tablet 25 mg BID    dextrose 10% bolus 125 mL 125 mL PRN    dextrose 10% bolus 250 mL 250 mL PRN    furosemide injection 80 mg Daily    glucagon (human recombinant) injection 1 mg PRN    glucose chewable tablet 16 g PRN    glucose chewable tablet 24 g PRN    heparin (porcine) injection 1,000 Units PRN    heparin (porcine) injection 5,000 Units Q8H    hydrALAZINE tablet 25 mg Q8H PRN    hydrALAZINE tablet 75 mg Q8H    melatonin tablet 6 mg Nightly PRN    naloxone 0.4 mg/mL injection 0.02 mg PRN    ondansetron injection 4 mg Q8H PRN    polyethylene glycol packet 17 g BID PRN    senna tablet 8.6 mg Daily PRN    sodium bicarbonate tablet 1,300 mg TID    sodium chloride 0.9% flush 10 mL Q12H PRN    sodium chloride 0.9% flush 10 mL PRN    sodium zirconium cyclosilicate packet 10 g Daily       Objective:     Vital Signs (Most Recent):  Temp: 99 °F (37.2 °C) (11/15/23 0801)  Pulse: 73 (11/15/23 0801)  Resp: 20 (11/15/23 0801)  BP: (!) 147/67 (11/15/23 0801)  SpO2: 96 % (11/15/23 0801) Vital Signs (24h Range):  Temp:  [97.9 °F (36.6 °C)-99.1 °F (37.3 °C)] 99 °F (37.2 °C)  Pulse:  [66-73] 73  Resp:  [15-20] 20  SpO2:  [94 %-96 %] 96 %  BP:  "(145-159)/(66-78) 147/67     Weight: 113.7 kg (250 lb 10.6 oz) (11/14/23 0400)  Body mass index is 39.25 kg/m².  Body surface area is 2.32 meters squared.    I/O last 3 completed shifts:  In: 2290 [P.O.:840; Blood:350; Other:1000; IV Piggyback:100]  Out: 2975 [Urine:1975; Other:1000]     Physical Exam  HENT:      Head: Normocephalic.      Mouth/Throat:      Mouth: Mucous membranes are moist.   Cardiovascular:      Rate and Rhythm: Normal rate.      Pulses: Normal pulses.   Pulmonary:      Effort: Pulmonary effort is normal.   Musculoskeletal:      Cervical back: Normal range of motion.   Skin:     General: Skin is warm.   Neurological:      General: No focal deficit present.      Mental Status: He is alert.   Psychiatric:         Mood and Affect: Mood normal.          Significant Labs:  ABGs:   Recent Labs   Lab 11/09/23  2136   PH 7.469*   PCO2 37.7   HCO3 27.4   POCSATURATED 97   BE 4*     BMP:   Recent Labs   Lab 11/14/23  0550         K 4.3      CO2 26   BUN 86*   CREATININE 12.2*   CALCIUM 8.4*   MG 2.1     Cardiac Markers: No results for input(s): "CKMB", "TROPONINT", "MYOGLOBIN" in the last 168 hours.  CBC:   Recent Labs   Lab 11/14/23  0550   WBC 6.83   RBC 2.53*   HGB 6.7*   HCT 22.0*   *   MCV 87   MCH 26.5*   MCHC 30.5*     CMP:   Recent Labs   Lab 11/14/23  0550      CALCIUM 8.4*   ALBUMIN 3.0*   PROT 5.9*      K 4.3   CO2 26      BUN 86*   CREATININE 12.2*   ALKPHOS 53*   ALT 15   AST 15   BILITOT 0.4     Coagulation:   Recent Labs   Lab 11/12/23  0704   INR 1.1   APTT 27.7     LFTs:   Recent Labs   Lab 11/14/23  0550   ALT 15   AST 15   ALKPHOS 53*   BILITOT 0.4   PROT 5.9*   ALBUMIN 3.0*     Microbiology Results (last 7 days)       ** No results found for the last 168 hours. **          Specimen (24h ago, onward)      None          PTH: No results for input(s): "PTH" in the last 168 hours.  TSH: No results for input(s): "TSH" in the last 168 hours.  No " "results for input(s): "COLORU", "CLARITYU", "SPECGRAV", "PHUR", "PROTEINUA", "GLUCOSEU", "BILIRUBINCON", "BLOODU", "WBCU", "RBCU", "BACTERIA", "MUCUS", "NITRITE", "LEUKOCYTESUR", "UROBILINOGEN", "HYALINECASTS" in the last 168 hours.     "

## 2023-11-15 NOTE — PLAN OF CARE
[facility] Plan of Care Note  Dx Fluid Volume Overload (SOB)    Shift Events Dialysis and 1 unit of blood given    Goals of Care: pain management, schedule Dialysis and 1 unit of blood    Neuro: AAO X 4    Vital Signs: VSS    Respiratory: RA    Diet: Renal  1200 fluid restriction    Is patient tolerating current diet? yes    GTTS: none    Urine Output/Bowel Movement: adequate urine output and last BM 11/13/23    Drains/Tubes/Tube Feeds (include total output/shift): none    Lines: Right FA 20 g/Right I J permacath    Accuchecks: none    Skin: none    Fall Risk Score: 7    Activity level? independent    Any scheduled procedures? None    Any safety concerns? Fall precautions    Other: none  Problem: Adult Inpatient Plan of Care  Goal: Patient-Specific Goal (Individualized)  Outcome: Ongoing, Progressing  Goal: Absence of Hospital-Acquired Illness or Injury  Outcome: Ongoing, Progressing  Goal: Optimal Comfort and Wellbeing  Outcome: Ongoing, Progressing  Goal: Readiness for Transition of Care  Outcome: Ongoing, Progressing     Problem: Diabetes Comorbidity  Goal: Blood Glucose Level Within Targeted Range  Outcome: Ongoing, Progressing     Problem: Fluid Volume Excess  Goal: Fluid Balance  Outcome: Ongoing, Progressing     Problem: Fluid and Electrolyte Imbalance (Acute Kidney Injury/Impairment)  Goal: Fluid and Electrolyte Balance  Outcome: Ongoing, Progressing     Problem: Oral Intake Inadequate (Acute Kidney Injury/Impairment)  Goal: Optimal Nutrition Intake  Outcome: Ongoing, Progressing     Problem: Renal Function Impairment (Acute Kidney Injury/Impairment)  Goal: Effective Renal Function  Outcome: Ongoing, Progressing     Problem: Infection  Goal: Absence of Infection Signs and Symptoms  Outcome: Ongoing, Progressing     Problem: Device-Related Complication Risk (Hemodialysis)  Goal: Safe, Effective Therapy Delivery  Outcome: Ongoing, Progressing     Problem: Hemodynamic Instability (Hemodialysis)  Goal: Effective  Tissue Perfusion  Outcome: Ongoing, Progressing     Problem: Infection (Hemodialysis)  Goal: Absence of Infection Signs and Symptoms  Outcome: Ongoing, Progressing

## 2023-11-15 NOTE — PROGRESS NOTES
"[facility] - [facility] Stepdown  Nephrology  Progress Note    Patient Name: Wai Bain  MRN: [id number]  Admission Date: 11/9/2023  Hospital Length of Stay: 4 days  Attending Provider: Ivan Hall MD   Primary Care Physician: Abhi Peralta MD  Principal Problem:Fluid overload    Subjective:     HPI: HPI: Wai Bain is a 53 y.o. male with a PMHx of DM2, HTN, CKD5, and anemia who presents to the ED with complaints of shortness of breath. Per pt and his wife he is followed by nephrology outpatient. He has an appointment tomorrow for HD tunneled line placement. His wife reports he became acutely short of breath today noting he has had intermittent CAT for several months. Pt endorses associated BLE edema, fatigue, abdominal distention, non productive cough, 15lb weight gain x1 month, nausea, and decreased UO. He notes intermittent lightheadedness/ head "fullness" with exertion. The patient's wife reports he was hospitalized about 3 weeks ago at [facility] for anemia and elevated blood pressure. She states his blood pressure have been much better controlled since the addition of several antihypertensives. The patient denies fever, chills, CP, abdominal pain, vomiting, diarrhea, or dysuria.     In the ED, VSS, afebrile. CBC with stable anemia. Cr 11.3 (consistent with CKD5). Calcium 8.3. Albumin 3.3. . Troponin 0.093. EKG with SR, 67 bpm, no ST elevation or depression. CXR reveals pulmonary edema. The patient received 80mg IV lasix    Interval History:   Patient seen and examined , iHD yesterday was able to tolerate it well      Review of patient's allergies indicates:  No Known Allergies  Current Facility-Administered Medications   Medication Frequency    0.9%  NaCl infusion (for blood administration) Q24H PRN    0.9%  NaCl infusion (for blood administration) Q24H PRN    0.9%  NaCl infusion (for blood administration) Q24H PRN    0.9%  NaCl infusion Continuous PRN    0.9%  NaCl infusion " Once    acetaminophen 1,000 mg/100 mL (10 mg/mL) injection Continuous PRN    acetaminophen tablet 1,000 mg Q8H PRN    acetaminophen tablet 650 mg Q6H PRN    amLODIPine tablet 10 mg Daily    benzonatate capsule 100 mg TID PRN    bisacodyL suppository 10 mg Daily PRN    calcium acetate(phosphat bind) capsule 1,334 mg TID WM    carvediloL tablet 25 mg BID    dextrose 10% bolus 125 mL 125 mL PRN    dextrose 10% bolus 250 mL 250 mL PRN    furosemide injection 80 mg Daily    glucagon (human recombinant) injection 1 mg PRN    glucose chewable tablet 16 g PRN    glucose chewable tablet 24 g PRN    heparin (porcine) injection 1,000 Units PRN    heparin (porcine) injection 5,000 Units Q8H    hydrALAZINE tablet 25 mg Q8H PRN    hydrALAZINE tablet 75 mg Q8H    melatonin tablet 6 mg Nightly PRN    naloxone 0.4 mg/mL injection 0.02 mg PRN    ondansetron injection 4 mg Q8H PRN    polyethylene glycol packet 17 g BID PRN    senna tablet 8.6 mg Daily PRN    sodium bicarbonate tablet 1,300 mg TID    sodium chloride 0.9% flush 10 mL Q12H PRN    sodium chloride 0.9% flush 10 mL PRN    sodium zirconium cyclosilicate packet 10 g Daily       Objective:     Vital Signs (Most Recent):  Temp: 99 °F (37.2 °C) (11/15/23 0801)  Pulse: 73 (11/15/23 0801)  Resp: 20 (11/15/23 0801)  BP: (!) 147/67 (11/15/23 0801)  SpO2: 96 % (11/15/23 0801) Vital Signs (24h Range):  Temp:  [97.9 °F (36.6 °C)-99.1 °F (37.3 °C)] 99 °F (37.2 °C)  Pulse:  [66-73] 73  Resp:  [15-20] 20  SpO2:  [94 %-96 %] 96 %  BP: (145-159)/(66-78) 147/67     Weight: 113.7 kg (250 lb 10.6 oz) (11/14/23 0400)  Body mass index is 39.25 kg/m².  Body surface area is 2.32 meters squared.    I/O last 3 completed shifts:  In: 2290 [P.O.:840; Blood:350; Other:1000; IV Piggyback:100]  Out: 2975 [Urine:1975; Other:1000]     Physical Exam  HENT:      Head: Normocephalic.      Mouth/Throat:      Mouth: Mucous membranes are moist.   Cardiovascular:      Rate and Rhythm: Normal rate.      Pulses:  "Normal pulses.   Pulmonary:      Effort: Pulmonary effort is normal.   Musculoskeletal:      Cervical back: Normal range of motion.   Skin:     General: Skin is warm.   Neurological:      General: No focal deficit present.      Mental Status: He is alert.   Psychiatric:         Mood and Affect: Mood normal.          Significant Labs:  ABGs:   Recent Labs   Lab 11/09/23  2136   PH 7.469*   PCO2 37.7   HCO3 27.4   POCSATURATED 97   BE 4*     BMP:   Recent Labs   Lab 11/14/23  0550         K 4.3      CO2 26   BUN 86*   CREATININE 12.2*   CALCIUM 8.4*   MG 2.1     Cardiac Markers: No results for input(s): "CKMB", "TROPONINT", "MYOGLOBIN" in the last 168 hours.  CBC:   Recent Labs   Lab 11/14/23  0550   WBC 6.83   RBC 2.53*   HGB 6.7*   HCT 22.0*   *   MCV 87   MCH 26.5*   MCHC 30.5*     CMP:   Recent Labs   Lab 11/14/23  0550      CALCIUM 8.4*   ALBUMIN 3.0*   PROT 5.9*      K 4.3   CO2 26      BUN 86*   CREATININE 12.2*   ALKPHOS 53*   ALT 15   AST 15   BILITOT 0.4     Coagulation:   Recent Labs   Lab 11/12/23  0704   INR 1.1   APTT 27.7     LFTs:   Recent Labs   Lab 11/14/23  0550   ALT 15   AST 15   ALKPHOS 53*   BILITOT 0.4   PROT 5.9*   ALBUMIN 3.0*     Microbiology Results (last 7 days)       ** No results found for the last 168 hours. **          Specimen (24h ago, onward)      None          PTH: No results for input(s): "PTH" in the last 168 hours.  TSH: No results for input(s): "TSH" in the last 168 hours.  No results for input(s): "COLORU", "CLARITYU", "SPECGRAV", "PHUR", "PROTEINUA", "GLUCOSEU", "BILIRUBINCON", "BLOODU", "WBCU", "RBCU", "BACTERIA", "MUCUS", "NITRITE", "LEUKOCYTESUR", "UROBILINOGEN", "HYALINECASTS" in the last 168 hours.     Assessment/Plan:     Renal/  CKD (chronic kidney disease), stage V  Baseline GFR 4  Suspect etiology to be chronic uncontrolled DM    11/14  - total intake 580, UOP in 12 hr 1.350, total output 770  Net negative 3.4  - scr we " have from 10/20 level of 10.49 trend up to 10/30 with level of 12.87  Then from 11/09 scr level 11.3 -11.5-11.8-11.4      Plan  - iHD 11/14 was able to tolerate it x 3hr , No UF,   -He has a 2:00pm chair time for outpt HD   -Referred by outpatient Dr for CKD education, KTxp evaluation, AVF placement (not placed )  -admit to [facility] for HD initiation  -on sodium bicarb 1300mg, lokelma 10g ,   -Tunneled plan on Monday  - avoid nephrotoxic        Thank you for your consult. I will follow-up with patient. Please contact us if you have any additional questions.    Masoud Isaac MD  Nephrology  [facility] - Trans[facility] Stepdown

## 2023-11-19 NOTE — HOSPITAL COURSE
Patient diuresed with IV Lasix with improvement in respiratory status until tunneled HD line able to placed.  After tunneled HD line placement, patient had bleeding from access site that was managed by Interventional Nephrology with improvement.  Patient underwent initial HD session without issues.  Discharged home to continue initiation of outpatient HD with his dialysis center

## 2023-11-19 NOTE — DISCHARGE SUMMARY
"[facility] - [facility]plant [facility] Medicine  Discharge Summary      Patient Name: Wai Bain  MRN: [id number]  BRICE: [id number]  Patient Class: IP- Inpatient  Admission Date: 11/9/2023  Hospital Length of Stay: 4 days  Discharge Date and Time: 11/15/2023  5:19 PM  Attending Physician: No att. providers found   Discharging Provider: Ivan Hall MD  Primary Care Provider: Abhi Peralta MD  [facility] Medicine Team: [facility] MED S Ivan Hall MD  Primary Care Team: [facility]    HPI:   Wai Bain is a 53 y.o. male with a PMHx of DM2, HTN, CKD5, and anemia who presents to the ED with complaints of shortness of breath. Per pt and his wife he is followed by nephrology outpatient. He has an appointment tomorrow for HD tunneled line placement. His wife reports he became acutely short of breath today noting he has had intermittent CAT for several months. Pt endorses associated BLE edema, fatigue, abdominal distention, non productive cough, 15lb weight gain x1 month, nausea, and decreased UO. He notes intermittent lightheadedness/ head "fullness" with exertion. The patient's wife reports he was hospitalized about 3 weeks ago at [facility] for anemia and elevated blood pressure. She states his blood pressure have been much better controlled since the addition of several antihypertensives. The patient denies fever, chills, CP, abdominal pain, vomiting, diarrhea, or dysuria.    In the ED, VSS, afebrile. CBC with stable anemia. Cr 11.3 (consistent with CKD5). Calcium 8.3. Albumin 3.3. . Troponin 0.093. EKG with SR, 67 bpm, no ST elevation or depression. CXR reveals pulmonary edema. The patient received 80mg IV lasix.    Procedure(s) (LRB):  Insertion, Catheter, Central Venous, Hemodialysis (Right)      Hospital Course:   Patient diuresed with IV Lasix with improvement in respiratory status until tunneled HD line able to placed.  After tunneled HD line placement, patient had " bleeding from access site that was managed by Interventional Nephrology with improvement.  Patient underwent initial HD session without issues.  Discharged home to continue initiation of outpatient HD with his dialysis center     Goals of Care Treatment Preferences:  Code Status: Full Code      Consults:   Consults (From admission, onward)          Status Ordering Provider     Inpatient consult to Midline team  Once        Provider:  (Not yet assigned)    Completed JEVON TINEO     IP consult to Interventional Nephrology  Once        Provider:  (Not yet assigned)    Completed JEVON TINEO     Inpatient consult to Interventional Radiology  Once        Provider:  (Not yet assigned)    Completed ABRAHAM MONTANA     Inpatient consult to Nephrology  Once        Provider:  (Not yet assigned)    Completed ABRAHAM MONTANA            No new Assessment & Plan notes have been filed under this hospital service since the last note was generated.  Service: Hospital Medicine    Final Active Diagnoses:    Diagnosis Date Noted POA    PRINCIPAL PROBLEM:  Fluid overload [E87.70] 11/09/2023 Yes    CKD (chronic kidney disease), stage V [N18.5] 10/21/2023 Yes    Hyperphosphatemia [E83.39] 11/09/2023 Yes    Severe obesity (BMI >= 40) [E66.01] 10/21/2023 Yes    Anemia due to stage 5 chronic kidney disease, not on chronic dialysis [N18.5, D63.1] 10/21/2023 Yes    Essential hypertension [I10] 01/14/2020 Yes    Type 2 diabetes mellitus with hyperglycemia, without long-term current use of insulin [E11.65] 01/14/2020 Yes      Problems Resolved During this Admission:       Discharged Condition: good    Disposition: Home or Self Care    Follow Up:    Patient Instructions:      Ambulatory referral/consult to Internal Medicine   Standing Status: Future   Referral Priority: Routine Referral Type: Consultation   Referral Reason: Specialty Services Required   Requested Specialty: Internal Medicine   Number of Visits Requested: 1      Diet renal     Notify your health care provider if you experience any of the following:  increased confusion or weakness     Notify your health care provider if you experience any of the following:  persistent dizziness, light-headedness, or visual disturbances     Notify your health care provider if you experience any of the following:  worsening rash     Notify your health care provider if you experience any of the following:  severe persistent headache     Notify your health care provider if you experience any of the following:  difficulty breathing or increased cough     Notify your health care provider if you experience any of the following:  redness, tenderness, or signs of infection (pain, swelling, redness, odor or green/yellow discharge around incision site)     Notify your health care provider if you experience any of the following:  severe uncontrolled pain     Notify your health care provider if you experience any of the following:  persistent nausea and vomiting or diarrhea     Notify your health care provider if you experience any of the following:  temperature >100.4     Activity as tolerated       Significant Diagnostic Studies: N/A    Pending Diagnostic Studies:       None           Medications:  Reconciled Home Medications:      Medication List        START taking these medications      benzonatate 100 MG capsule  Commonly known as: TESSALON  [place] 1 cápsula (100 mg en total) por vía oral 3 (abhishek) veces al día según sea necesario para la tos.  (Take 1 capsule (100 mg total) by mouth 3 (three) times daily as needed for Cough.)            CONTINUE taking these medications      amLODIPine 10 MG tablet  Commonly known as: NORVASC  [place] carito tableta (10 mg en total) por vía oral diariamente.  (Take 1 tablet (10 mg total) by mouth once daily.)     calcium acetate(phosphat bind) 667 mg capsule  Commonly known as: PHOSLO  Take 2 capsules (1,334 mg total) by mouth 3 (three) times daily with meals.      carvediloL 25 MG tablet  Commonly known as: COREG  [place] carito tableta (25 mg en total) por vía oral 2 veces al día.  (Take 1 tablet (25 mg total) by mouth 2 (two) times daily.)     hydrALAZINE 50 MG tablet  Commonly known as: APRESOLINE  Take 1 tablet (50 mg total) by mouth every 8 (eight) hours.     LOKELMA 10 gram packet  Generic drug: sodium zirconium cyclosilicate  Take 1 packet (10 g total) by mouth once daily. Mix entire contents of packet(s) into drinking glass containing 3 tablespoons of water; stir well and drink immediately. Add water and repeat until no powder remains to receive entire dose.     sodium bicarbonate 650 MG tablet  [place] 2 tabletas (1,300 mg en total) por vía oral 3 (abhishek) veces al día  (Take 2 tablets (1,300 mg total) by mouth 3 (three) times daily.)              Indwelling Lines/Drains at time of discharge:   Lines/Drains/Airways       Central Venous Catheter Line  Duration                  Hemodialysis Catheter 11/13/23 1217 right internal jugular 5 days                    Time spent on the discharge of patient: 35 minutes         Ivan Hall MD  Department of Hospital Medicine  [facility] - Transplant [facility]

## 2023-11-21 ENCOUNTER — TELEPHONE (OUTPATIENT)
Dept: TRANSPLANT | Facility: CLINIC | Age: 53
End: 2023-11-21
Payer: MEDICAID

## 2023-11-27 ENCOUNTER — TELEPHONE (OUTPATIENT)
Dept: TRANSPLANT | Facility: CLINIC | Age: 53
End: 2023-11-27
Payer: MEDICAID

## 2023-11-27 NOTE — TELEPHONE ENCOUNTER
Compliance form sent to NEPHROLOGIST Katiuska Tillman     Pt just started Dialysis on 11/16/23, sent letter to Nephrologist to get updated compliance form, for the past three months.       Kelli Silveira MA

## 2023-12-07 ENCOUNTER — PROCEDURE VISIT (OUTPATIENT)
Dept: OPHTHALMOLOGY | Facility: CLINIC | Age: 53
End: 2023-12-07
Payer: MEDICAID

## 2023-12-07 DIAGNOSIS — H43.13 VITREOUS HEMORRHAGE, BILATERAL: ICD-10-CM

## 2023-12-07 DIAGNOSIS — H35.033 HYPERTENSIVE RETINOPATHY, BILATERAL: ICD-10-CM

## 2023-12-07 DIAGNOSIS — E11.3513 TYPE 2 DIABETES MELLITUS WITH BOTH EYES AFFECTED BY PROLIFERATIVE RETINOPATHY AND MACULAR EDEMA, WITHOUT LONG-TERM CURRENT USE OF INSULIN: Primary | ICD-10-CM

## 2023-12-07 DIAGNOSIS — H25.13 NS (NUCLEAR SCLEROSIS), BILATERAL: ICD-10-CM

## 2023-12-07 PROCEDURE — 67028 PR INJECT INTRAVITREAL PHARMCOLOGIC: ICD-10-PCS | Mod: 50,S$PBB,TXP, | Performed by: OPHTHALMOLOGY

## 2023-12-07 PROCEDURE — 67028 INJECTION EYE DRUG: CPT | Mod: 50,S$PBB,TXP, | Performed by: OPHTHALMOLOGY

## 2023-12-07 PROCEDURE — 92014 COMPRE OPH EXAM EST PT 1/>: CPT | Mod: 25,S$PBB,TXP, | Performed by: OPHTHALMOLOGY

## 2023-12-07 PROCEDURE — 67028 INJECTION EYE DRUG: CPT | Mod: 50,PBBFAC,NTX | Performed by: OPHTHALMOLOGY

## 2023-12-07 PROCEDURE — 92014 PR EYE EXAM, EST PATIENT,COMPREHESV: ICD-10-PCS | Mod: 25,S$PBB,TXP, | Performed by: OPHTHALMOLOGY

## 2023-12-07 RX ORDER — HYDROCODONE BITARTRATE AND ACETAMINOPHEN 5; 325 MG/1; MG/1
1 TABLET ORAL EVERY 6 HOURS PRN
COMMUNITY
Start: 2023-12-06 | End: 2023-12-13

## 2023-12-07 RX ADMIN — Medication 1.25 MG: at 04:12

## 2023-12-07 NOTE — PROGRESS NOTES
HPI    Patient here today for 1 mo f/u Avastin ou. Patient states OS improved,   but OD is still blurry and has noticeable blood, no pain and flashes OD.    AT ou prn  Last edited by Lynne Peralta on 12/7/2023  2:21 PM.            OCT - Complex DME OU  OD>OS    Prior WFFA - NV OU with late macular leakage OU        A/P    PDR OU  T2 uncontrolled without insulin  10/23 not seen x 8 months  Worsening VH OU    Will need PRP OU possible PPV OU if NI in heme    2. Complex DME OU  S/p Avastin OU x 4    Avastin OU today    Plan starting PRP OS soon      3. HTN Ret OU  BS/BP/chol control    4. Early NS OU      1 month OCT and dilate      Risks, benefits, and alternatives to treatment discussed in detail with the patient.  The patient voiced understanding and wished to proceed with the procedure    Injection Procedure Note:  Diagnosis: DME OU    Patient Identified and Time Out complete  Pt marked  Topical Proparacaine and Betadine.  Inject Avastin OU at 6:00 @ 3.5-4mm posterior to limbus  Post Operative Dx: Same  Complications: None  Follow up as above.

## 2024-01-02 ENCOUNTER — TELEPHONE (OUTPATIENT)
Dept: GASTROENTEROLOGY | Facility: CLINIC | Age: 54
End: 2024-01-02
Payer: MEDICAID

## 2024-01-02 NOTE — TELEPHONE ENCOUNTER
Charity ID#  [id number]  Left message for patient to call the scheduling line to schedule a colonoscopy.

## 2024-01-08 ENCOUNTER — TELEPHONE (OUTPATIENT)
Dept: TRANSPLANT | Facility: CLINIC | Age: 54
End: 2024-01-08
Payer: MEDICAID

## 2024-01-10 ENCOUNTER — OFFICE VISIT (OUTPATIENT)
Dept: TRANSPLANT | Facility: CLINIC | Age: 54
End: 2024-01-10
Payer: MEDICAID

## 2024-01-10 ENCOUNTER — HOSPITAL ENCOUNTER (OUTPATIENT)
Dept: RADIOLOGY | Facility: HOSPITAL | Age: 54
Discharge: HOME | End: 2024-01-10
Attending: NURSE PRACTITIONER
Payer: MEDICAID

## 2024-01-10 ENCOUNTER — TELEPHONE (OUTPATIENT)
Dept: TRANSPLANT | Facility: CLINIC | Age: 54
End: 2024-01-10
Payer: MEDICAID

## 2024-01-10 ENCOUNTER — HOSPITAL ENCOUNTER (OUTPATIENT)
Dept: RADIOLOGY | Facility: HOSPITAL | Age: 54
Discharge: HOME | End: 2024-01-10
Attending: NURSE PRACTITIONER
Payer: MEDICARE

## 2024-01-10 VITALS
WEIGHT: 224 LBS | BODY MASS INDEX: 35.16 KG/M2 | SYSTOLIC BLOOD PRESSURE: 124 MMHG | HEART RATE: 66 BPM | OXYGEN SATURATION: 100 % | RESPIRATION RATE: 18 BRPM | DIASTOLIC BLOOD PRESSURE: 58 MMHG | TEMPERATURE: 98 F | HEIGHT: 67 IN

## 2024-01-10 DIAGNOSIS — D63.1 ANEMIA DUE TO CHRONIC KIDNEY DISEASE, ON CHRONIC DIALYSIS: ICD-10-CM

## 2024-01-10 DIAGNOSIS — Z76.82 ORGAN TRANSPLANT CANDIDATE: ICD-10-CM

## 2024-01-10 DIAGNOSIS — N18.6 TYPE 2 DM WITH HYPERTENSION AND ESRD ON DIALYSIS: ICD-10-CM

## 2024-01-10 DIAGNOSIS — E11.22 TYPE 2 DM WITH HYPERTENSION AND ESRD ON DIALYSIS: ICD-10-CM

## 2024-01-10 DIAGNOSIS — Z99.2 ESRD ON HEMODIALYSIS: ICD-10-CM

## 2024-01-10 DIAGNOSIS — N18.6 ANEMIA DUE TO CHRONIC KIDNEY DISEASE, ON CHRONIC DIALYSIS: ICD-10-CM

## 2024-01-10 DIAGNOSIS — Z99.2 ANEMIA DUE TO CHRONIC KIDNEY DISEASE, ON CHRONIC DIALYSIS: ICD-10-CM

## 2024-01-10 DIAGNOSIS — Z99.2 TYPE 2 DM WITH HYPERTENSION AND ESRD ON DIALYSIS: ICD-10-CM

## 2024-01-10 DIAGNOSIS — N18.6 ESRD ON HEMODIALYSIS: ICD-10-CM

## 2024-01-10 DIAGNOSIS — I12.0 TYPE 2 DM WITH HYPERTENSION AND ESRD ON DIALYSIS: ICD-10-CM

## 2024-01-10 DIAGNOSIS — N18.5 CKD (CHRONIC KIDNEY DISEASE), STAGE V: ICD-10-CM

## 2024-01-10 DIAGNOSIS — Z01.818 PRE-TRANSPLANT EVALUATION FOR CHRONIC KIDNEY DISEASE: Primary | ICD-10-CM

## 2024-01-10 DIAGNOSIS — E11.65 TYPE 2 DIABETES MELLITUS WITH HYPERGLYCEMIA, WITHOUT LONG-TERM CURRENT USE OF INSULIN: ICD-10-CM

## 2024-01-10 DIAGNOSIS — E66.01 CLASS 2 SEVERE OBESITY DUE TO EXCESS CALORIES WITH SERIOUS COMORBIDITY AND BODY MASS INDEX (BMI) OF 35.0 TO 35.9 IN ADULT: ICD-10-CM

## 2024-01-10 PROBLEM — R68.81 EARLY SATIETY: Status: RESOLVED | Noted: 2020-03-10 | Resolved: 2024-01-10

## 2024-01-10 PROBLEM — R05.9 COUGH IN ADULT: Status: RESOLVED | Noted: 2023-10-21 | Resolved: 2024-01-10

## 2024-01-10 PROBLEM — R10.9 RIGHT FLANK PAIN: Status: RESOLVED | Noted: 2020-01-21 | Resolved: 2024-01-10

## 2024-01-10 PROBLEM — K80.20 CHOLELITHIASIS: Status: RESOLVED | Noted: 2020-06-22 | Resolved: 2024-01-10

## 2024-01-10 PROBLEM — E66.812 CLASS 2 SEVERE OBESITY DUE TO EXCESS CALORIES WITH SERIOUS COMORBIDITY AND BODY MASS INDEX (BMI) OF 35.0 TO 35.9 IN ADULT: Status: ACTIVE | Noted: 2023-10-21

## 2024-01-10 PROBLEM — R31.29 MICROSCOPIC HEMATURIA: Status: RESOLVED | Noted: 2020-01-21 | Resolved: 2024-01-10

## 2024-01-10 PROBLEM — R10.11 RUQ ABDOMINAL PAIN: Status: RESOLVED | Noted: 2020-01-21 | Resolved: 2024-01-10

## 2024-01-10 PROBLEM — R10.11 RUQ PAIN: Status: RESOLVED | Noted: 2020-03-13 | Resolved: 2024-01-10

## 2024-01-10 PROCEDURE — 71046 X-RAY EXAM CHEST 2 VIEWS: CPT | Mod: 26,TXP,, | Performed by: RADIOLOGY

## 2024-01-10 PROCEDURE — 99999 PR PBB SHADOW E&M-EST. PATIENT-LVL IV: CPT | Mod: PBBFAC,TXP,, | Performed by: NURSE PRACTITIONER

## 2024-01-10 PROCEDURE — 72170 X-RAY EXAM OF PELVIS: CPT | Mod: TC,TXP

## 2024-01-10 PROCEDURE — 3044F HG A1C LEVEL LT 7.0%: CPT | Mod: CPTII,TXP,, | Performed by: NURSE PRACTITIONER

## 2024-01-10 PROCEDURE — 71046 X-RAY EXAM CHEST 2 VIEWS: CPT | Mod: TC,TXP

## 2024-01-10 PROCEDURE — 72170 X-RAY EXAM OF PELVIS: CPT | Mod: 26,TXP,, | Performed by: RADIOLOGY

## 2024-01-10 PROCEDURE — 93978 VASCULAR STUDY: CPT | Mod: TC,TXP

## 2024-01-10 PROCEDURE — 99205 OFFICE O/P NEW HI 60 MIN: CPT | Mod: S$PBB,TXP,, | Performed by: NURSE PRACTITIONER

## 2024-01-10 PROCEDURE — 99204 OFFICE O/P NEW MOD 45 MIN: CPT | Mod: S$PBB,TXP,, | Performed by: PHYSICIAN ASSISTANT

## 2024-01-10 PROCEDURE — 3078F DIAST BP <80 MM HG: CPT | Mod: CPTII,TXP,, | Performed by: NURSE PRACTITIONER

## 2024-01-10 PROCEDURE — 3074F SYST BP LT 130 MM HG: CPT | Mod: CPTII,TXP,, | Performed by: NURSE PRACTITIONER

## 2024-01-10 PROCEDURE — 99214 OFFICE O/P EST MOD 30 MIN: CPT | Mod: PBBFAC,25,TXP | Performed by: NURSE PRACTITIONER

## 2024-01-10 PROCEDURE — 93978 VASCULAR STUDY: CPT | Mod: 26,TXP,, | Performed by: RADIOLOGY

## 2024-01-10 PROCEDURE — 3008F BODY MASS INDEX DOCD: CPT | Mod: CPTII,TXP,, | Performed by: NURSE PRACTITIONER

## 2024-01-10 PROCEDURE — 99204 OFFICE O/P NEW MOD 45 MIN: CPT | Mod: S$PBB,TXP,, | Performed by: TRANSPLANT SURGERY

## 2024-01-10 NOTE — LETTER
January 12, 2024        Katiuska Desouza  [address]  [address]  [facility]  [facility] [address]  Phone: [phone]  Fax: [phone]             [facility]- Transplant 1st Fl  [address]  [facility] [address]  Phone: [phone]   Patient: Wai aBin   MR Number: [id number]   YOB: 1970   Date of Visit: 1/10/2024       Dear Dr. aKtiuska Desouza    Thank you for referring Wai Bain to me for evaluation. Attached you will find relevant portions of my assessment and plan of care.    If you have questions, please do not hesitate to call me. I look forward to following Wai Bain along with you.    Sincerely,    Angélica Carver, NP    Enclosure    If you would like to receive this communication electronically, please contact [email or web address] or [phone] to request [employer] access.    [employer] is a tool which provides read-only access to select patient information with whom you have a relationship. Its easy to use and provides real time access to review your patients record including encounter summaries, notes, results, and demographic information.    If you feel you have received this communication in error or would no longer like to receive these types of communications, please e-mail [email or web address]

## 2024-01-10 NOTE — PROGRESS NOTES
Transplant Surgery  Kidney Transplant Recipient Evaluation    Referring Physician: Katiuska Desouza  Current Nephrologist: Katiuska Desouza    Subjective:     Reason for Visit: evaluate transplant candidacy    History of Present Illness: Wai Bain is a 53 y.o. year old male undergoing transplant evaluation.    Dialysis History: Wai CINTRON is on hemodialysis.      Transplant History: N/A    Etiology of Renal Disease: Diabetes Mellitus - Type II (based on medical records from referral).    External provider notes reviewed: No    Review of Systems  Objective:     Physical Exam:  Constitutional:   Vitals reviewed: yes   Well-nourished and well-groomed: yes  Eyes:   Sclerae icteric: no   Extraocular movements intact: yes  GI:    Bowel sounds normal: yes   Tenderness: no    If yes, quadrant/location: not applicable   Palpable masses: no    If yes, quadrant/location: not applicable   Hepatosplenomegaly: no   Ascites: no   Hernia: no    If yes, type/location: not applicable   Surgical scars: yes    If yes, type/location: laparoscopic port sites    Resp:   Effort normal: yes   Breath sounds normal: yes    CV:   Regular rate and rhythm: yes   Heart sounds normal: yes   Femoral pulses normal: yes   Extremities edematous: no  Skin:   Rashes or lesions present: no    If yes, describe:not applicable   Jaundice:: no    Musculoskeletal:   Gait normal: yes   Strength normal: yes  Psych:   Oriented to person, place, and time: yes   Affect and mood normal: yes    Additional comments: not applicable    Diagnostics:  The following labs have been reviewed:   The following radiology images have been independently reviewed and interpreted:     Counseling: We provided Wai Bain with a group education session today.  We discussed kidney transplantation at length with him, including risks, potential complications, and alternatives in the management of his renal failure.  The discussion included complications related to  anesthesia, bleeding, infection, primary nonfunction, and ATN.  I discussed the typical postoperative course, length of hospitalization, the need for long-term immunosuppression, and the need for long-term routine follow-up.  I discussed living-donor and -donor transplantation and the relative advantages and disadvantages of each.  I also discussed average waiting times for both living donation and  donation.  I discussed national and center-specific survival rates.  I also mentioned the potential benefit of multicenter listing to candidates listed with centers within more than one organ procurement organization.  All questions were answered.    Patient advised that it is recommended that all transplant candidates, and their close contacts and household members receive Covid vaccination.    Final determination of transplant candidacy will be made once evaluation is complete and reviewed by the Kidney & Kidney/Pancreas Selection Committee.    Coronavirus disease (COVID-19) caused by severe acute respiratory virus coronavirus 2 (SARS-C0V 2) is associated with increased mortality in solid organ transplant recipients (SOT) compared to non-transplant patients. Vaccine responses to vaccination are depressed against SARS-CoV2 compared to normal individuals but improve with third vaccination doses. Vaccination prior to SOT provides both the best opportunity for transplant candidates to develop protective immunity and to reduce the risk of serious COVID19 infections post transplantation. Organ transplant candidates at [facility] will be required to receive SARS-CoV-2 vaccination prior to being listed with a an active status, whenever possible. Exceptions will be made for disability related reasons or for sincerely held [employer] beliefs.          Transplant Surgery - Candidacy   Assessment/Plan:   Wai CINTRON Vergarayunior Bain has end stage renal disease (ESRD) on dialysis. I see no  surgical contraindication to placing a kidney transplant. Based on available information, Wai Bain is a suitable kidney transplant candidate.     Additional testing to be completed according to the Written Order Guidelines for Adult Pre-kidney and Pancreas Transplant Evaluation (KI-02).  Interpretation of tests and discussion of patient management involves all members of the multidisciplinary transplant team.    Tigre Rutherford MD

## 2024-01-10 NOTE — TELEPHONE ENCOUNTER
Reviewed pt transplant labs.  Notified dialysis unit dietitian of the following abnormal labs via fax and requested their most recent nutrition note on this pt.  Once this note is received it will be scanned into pt's chart.    K 3.3  Glucose 113  Calcium 8.6  Albumin 3.3

## 2024-01-10 NOTE — PROGRESS NOTES
Transplant Nephrology  Kidney Transplant Recipient Evaluation    Referring Physician: Katiuska Desouza  Current Nephrologist: Katiuska Desouza    Subjective:     services offered and patient and his wife declined      CC:  Initial evaluation of kidney transplant candidacy.    HPI:  Mr. Jai Bain is a 53 y.o. year old Other male who has presented to be evaluated as a potential kidney transplant recipient.  He has ESRD secondary to diabetic nephropathy.  Patient is currently on hemodialysis started on ~11/2023. Patient is dialyzing on TTS schedule.  Patient reports that he is tolerating dialysis well.. He has a  right chest catheter (and a healing LUE AV fistula) for dialysis access. He is dialyzing 4 hours per session.     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 He looks good, not frail.     Donors: no  Discussed/ encouraged  living donation     Previous Transplant: no  DM2  Lab Results   Component Value Date    HGBA1C 5.0 01/10/2024   Diabetes: Type II    Dx  Diabetes: ~ 40s  On insulin: no   NO MEDS  Retinopathy: yes  Neuropathy: no   Amputation: no  Symptomatic Peripheral Vascular Disease: unknown  Any Previous Malignancy:  NO      Past Medical History:   Diagnosis Date    Anemia     CKD (chronic kidney disease), stage V 10/21/2023    Diabetes mellitus     Diabetes mellitus, type 2     H/O esophagogastroduodenoscopy     Hypertension     Obesity     Urinary tract infection     History       Past Medical and Surgical History: Mr. Jai Bain  has a past medical history of Anemia, CKD (chronic kidney disease), stage V, Diabetes mellitus, Diabetes mellitus, type 2, H/O esophagogastroduodenoscopy, Hypertension, Obesity, and Urinary tract infection.  He has a past surgical history that includes Esophagogastroduodenoscopy (N/A,  "03/13/2020); Laparoscopic cholecystectomy (N/A, 06/22/2020); Insertion of tunneled central venous hemodialysis catheter (Right, 11/13/2023); and Cholecystectomy.    Past Social and Family History: Mr. Jai Bain reports that he has quit smoking. His smoking use included cigarettes. He has never used smokeless tobacco. He reports current alcohol use of about 1.0 standard drink of alcohol per week. He reports that he does not use drugs. His family history includes Diabetes in his brother and mother; Hypertension in his brother; Kidney disease in his brother; Pancreatitis in his father.    Review of Systems   Constitutional:  Positive for unexpected weight change. Negative for activity change, appetite change, chills, fatigue and fever.   HENT:  Negative for congestion, facial swelling, postnasal drip, rhinorrhea, sinus pressure, sore throat and trouble swallowing.    Eyes:  Positive for visual disturbance. Negative for pain and redness.   Respiratory:  Negative for cough, chest tightness, shortness of breath and wheezing.    Cardiovascular:  Positive for leg swelling. Negative for chest pain and palpitations.   Gastrointestinal:  Negative for abdominal pain, diarrhea, nausea and vomiting.   Genitourinary:  Positive for decreased urine volume. Negative for dysuria, flank pain and urgency.   Musculoskeletal:  Negative for gait problem, neck pain and neck stiffness.   Skin:  Negative for rash.   Allergic/Immunologic: Negative for environmental allergies, food allergies and immunocompromised state.   Neurological:  Negative for dizziness, weakness, light-headedness and headaches.   Psychiatric/Behavioral:  Negative for agitation and confusion. The patient is not nervous/anxious.        Objective:   Blood pressure (!) 124/58, pulse 66, temperature 97.7 °F (36.5 °C), temperature source Tympanic, resp. rate 18, height 5' 7.01" (1.702 m), weight 101.6 kg (223 lb 15.8 oz), SpO2 100 %.body mass index is 35.07 " "kg/m².    Physical Exam  Constitutional:       Appearance: Normal appearance. He is well-developed.   HENT:      Head: Normocephalic.      Nose: Nose normal.   Eyes:      Conjunctiva/sclera: Conjunctivae normal.      Pupils: Pupils are equal, round, and reactive to light.   Cardiovascular:      Rate and Rhythm: Normal rate and regular rhythm.      Heart sounds: Normal heart sounds.   Pulmonary:      Effort: Pulmonary effort is normal.      Breath sounds: Normal breath sounds.   Chest:       Abdominal:      General: Bowel sounds are normal.      Palpations: Abdomen is soft. There is no hepatomegaly or splenomegaly.   Musculoskeletal:        Arms:       Cervical back: Normal range of motion and neck supple.   Skin:     General: Skin is warm and dry.   Neurological:      Mental Status: He is alert and oriented to person, place, and time.   Psychiatric:         Behavior: Behavior normal.         Labs:  Lab Results   Component Value Date    WBC 8.87 01/10/2024    HGB 10.1 (L) 01/10/2024    HCT 31.6 (L) 01/10/2024     01/10/2024    K 3.3 (L) 01/10/2024     01/10/2024    CO2 28 01/10/2024    BUN 22 (H) 01/10/2024    CREATININE 5.7 (H) 01/10/2024    EGFRNORACEVR 11.1 (A) 01/10/2024    CALCIUM 8.6 (L) 01/10/2024    PHOS 3.3 01/10/2024    MG 2.0 11/15/2023    ALBUMIN 3.3 (L) 01/10/2024    AST 18 01/10/2024    ALT 15 01/10/2024    UTPCR 3.78 (H) 10/27/2023    .3 (H) 10/27/2023       Lab Results   Component Value Date    BILIRUBINUA Negative 10/27/2023    LIPASE 40 12/22/2019    PROTEINUA 3+ (A) 10/27/2023    NITRITE Negative 10/27/2023    RBCUA 4 10/27/2023    WBCUA 5 10/27/2023       No results found for: "HLAABCTYPE"    Labs were reviewed with the patient.    Assessment:     1. Pre-transplant evaluation for chronic kidney disease    2. ESRD on hemodialysis    3. Type 2 DM with hypertension and ESRD on dialysis    4. Anemia due to chronic kidney disease, on chronic dialysis    5. Class 2 severe obesity due " to excess calories with serious comorbidity and body mass index (BMI) of 35.0 to 35.9 in adult        Plan:   Needs colonoscopy /guidelines     Transplant Candidacy:   Based on available information, Mr. Jai Bain is a suitable kidney transplant candidate.   Meets center eligibility for accepting HCV+ donor offer - Yes.  Patient educated on HCV+ donors. Wai CINTRON is willing to accept HCV+ donor offer - Yes   Patient is a candidate for KDPI > 85 kidney donor offer - No D/T WEIGHT  Final determination of transplant candidacy will be made once workup is complete and reviewed by the selection committee.    Patient advised that it is recommended that all transplant candidates, and their close contacts and household members receive Covid vaccination.    UNOS Patient Status  Functional Status: 60% - Requires occasional assistance but is able to care for needs  Angélica Carver NP

## 2024-01-10 NOTE — PROGRESS NOTES
Transplant Recipient Adult Psychosocial Assessment    Wai Bain  [address] [place] [address]  Telephone Information:   Mobile [phone]   Home  [phone] (home)  Work  There is no work phone number on file.  E-mail  [email or web address]    Sex: male  YOB: 1970  Age: 53 y.o.    Encounter Date: 1/10/2024  U[place] Citizen: yes  Primary Language: Sierra Leonean   Needed: yes patient utilized family member (wife, Jana Marcelo). Patient did not want to utilize offered  today    Emergency Contact:  Jana Marcelo, 55 yo wife, Glenda [place], does drive/own car, does not work/is housewife. [phone]     Family/Social Support:   Number of dependents/: pt denies  Marital history: together with Jana Marcelo for 30 years,  to Jana 10 years  Other family dynamics: Pt reports both parents are . Pt reports born in [place] and came to [place] . Pt reports having green card. Pt reports supportive wife Jana (fluent English and [place] Leonean) will be primary transplant caregiver. Pt reports Sierra Leonean speaking only sister Pili Vergara (Glenda YMLES) will be back up transplant caregiver if needed. Pt reports having no income; pt reports step children Rosalinda Millan ([facility]) and Khanh Millan ([facility]) assist patient and wife with any costs; both step children fluent English and [place] Leonean. Pt reports plan to apply for disability and will notify transplant SW of disability outcome.  Step children:  Rosalinda Millan, 32 yo step daughter, fluent English and [place], [place], does drive/own car, works at [employer] as . 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 [phone]    Household Composition:  Jana Marcelo, 55 yo wife, fluent English and [place], [place], does drive/own car, does not work/is housewife. [phone]     Do you  and your caregivers have access to reliable transportation? yes Pt reports is not driving due to blurry vision/diabetic retinopathy. Pt reports he is being treated with shots. Pt reports wife and other family assist with all transportation until his vision improves.    PRIMARY CAREGIVER: Jana Marcelo, wife, will be primary caregiver, phone number [phone]     provided in-depth information to patient and caregiver regarding pre- and post-transplant caregiver role.   strongly encourages patient and caregiver to have concrete plan regarding post-transplant care giving, including back-up caregiver(s) to ensure care giving needs are met as needed.    Patient and Caregiver states understanding all aspects of caregiver role/commitment and is able/willing/committed to being caregiver to the fullest extent necessary.    Patient and Caregiver verbalizes understanding of the education provided today and caregiver responsibilities.         remains available. Patient and Caregiver agree to contact  in a timely manner if concerns arise.      Able to take time off work without financial concerns: yes.     Additional Significant Others who will Assist with Transplant:  Pili Vergara, 51 yo sister, Slovak speaking only, [place]enda [place], does drive/own car, self employed . [phone]    Living Will: no  Healthcare Power of : no  Advance Directives on file: <<no information> per medical record.  Verbally reviewed LW/HCPA information.   provided patient with copy of LW/HCPA documents and provided education on completion of forms.    Living Donors: Education and resource information given to patient.    Highest Education Level: Grade School (0-8) completed 6th grade in [place]  Reading Ability: 6th grade Slovak. Pt reports can not read, write, speak or learn in English. Pt needs . Pt reports problems seeing due to right eye  diabetic retinopathy  Reports difficulty with: Pt reports difficulty reading, writing, comprehension, and learning in English; problems seeing due to right eye diabetic retinopathy  Learns Best By:   for medical information other instructions     Status: no  VA Benefits: no     Working for Income: No  Pt reports having no income; pt reports step children Rosalinda Millan ([place]) and Khanh Millan ([facility]) assist patient and wife with any costs; both step children fluent English and Ukrainian. Pt reports plan to apply for disability and will notify transplant SW of disability outcome.  Step children:  Rosalinda Millan, 34 yo step daughter, fluent English and Ukrainian, [place], does drive/own car, works at [employer] as . 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    Spouse/Significant Other Employment: Pt reports wife Jana does not work/is housewife.    Disabled: pt reports plant to apply for disability and will notify transplant SW of disability application outcome once known.    Monthly Income:  Zero income at this time. pt reports plant to apply for disability and will notify transplant SW of disability application outcome once known.  Able to afford all costs now and if transplanted, including medications: Pt reports having no income; pt reports step children Rosalinda Millan ([place]) and Khanh Millan ([facility]) assist patient and wife with any costs; both step children fluent English and Ukrainian. Pt reports plan to apply for disability and will notify transplant SW of disability outcome.  Step children:  Rosalinda Millan, 34 yo step daughter, fluent English and Ukrainian, [place], does drive/own car, works at [employer]  as . 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 [phone]    Patient and Caregiver verbalizes understanding of personal responsibilities related to transplant costs and the importance of having a financial plan to ensure that patients transplant costs are fully covered.       provided fundraising information/education. Patient and Caregiververbalizes understanding.   remains available.    Insurance:   Payer/Plan Subscr  Sex Relation Sub. Ins. ID Effective Group Num   1. MEDICAID - HE* GAGAN GILBERT* 1970 Male Self [id number]* 10/1/23                                    [address] [address]     Primary Insurance (for UNOS reporting): Public Insurance - Medicaid  Secondary Insurance (for UNOS reporting): None  Patient and Caregiver verbalizes clear understanding that patient may experience difficulty obtaining and/or be denied insurance coverage post-surgery. This includes and is not limited to disability insurance, life insurance, health insurance, burial insurance, long term care insurance, and other insurances.      Patient and Caregiver also reports understanding that future health concerns related to or unrelated to transplantation may not be covered by patient's insurance.  Resources and information provided and reviewed.     Patient and Caregiver provides verbal permission to release any necessary information to outside resources for patient care and discharge planning.  Resources and information provided are reviewed.      [facility], [phone].  Hemodialysis: Tues, Thurs and Sat for 4 hours    Dialysis Adherence: Patient and Caregiver reports high dialysis compliance with treatments and instructions within last 3 months.  Dialysis compliance update requested.    Infusion Service: patient utilizing? yes iron infusion 1 x week at dialysis unit  Home  "Health: patient utilizing? no  DME: no  Pulmonary/Cardiac Rehab: pt denies   ADLS:  pt reports is not driving due to blurry vision right eye diabetic retinopathy; wife/family assists with all transportation at this time. Pt reports independent with self care. Pt reports independent with medication management.    Adherence:   Pt reports high compliance with medical appointments and instructions within last 3 months. Adherence education and counseling provided.     Per History Section:  Past Medical History:   Diagnosis Date    Anemia     CKD (chronic kidney disease), stage V 10/21/2023    Diabetes mellitus     Diabetes mellitus, type 2     H/O esophagogastroduodenoscopy     Hypertension     Obesity     Urinary tract infection     History     Social History     Tobacco Use    Smoking status: Former     Types: Cigarettes    Smokeless tobacco: Never    Tobacco comments:     quit "long time ago"   Substance Use Topics    Alcohol use: Yes     Alcohol/week: 1.0 standard drink of alcohol     Types: 1 Cans of beer per week     Comment: social     Social History     Substance and Sexual Activity   Drug Use Never     Social History     Substance and Sexual Activity   Sexual Activity Yes    Partners: Female       Per Today's Psychosocial:  Tobacco: none, patient denies any use.  Alcohol: social use; 1 alcohol use per week.  Illicit Drugs/Non-prescribed Medications: none, patient denies any use.    Patient and Caregiver states clear understanding of the potential impact of substance use as it relates to transplant candidacy and is aware of possible random substance screening.  Substance abstinence/cessation counseling, education and resources provided and reviewed.     Arrests/DWI/Treatment/Rehab: patient denies    Psychiatric History:    Mental Health: Pt denies any mental health history or current mental health concerns. Pt denies any need for mental health referral at this time.  Psychiatrist/Counselor: pt " denies  Medications:  pt denies  Suicide/Homicide Issues: pt denies any history of si/hi   Safety at home: pt reports living in safe home environment with no abuse    Knowledge: Patient and Caregiver states having clear understanding and realistic expectations regarding the potential risks and potential benefits of organ transplantation and organ donation and agrees to discuss with health care team members and support system members, as well as to utilize available resources and express questions and/or concerns in order to further facilitate the pt informed decision-making.  Resources and information provided and reviewed.    Patient and Caregiver is aware of [facility]'s affiliation and/or partnership with agencies in home health care, LTAC, SNF, [facility], and other hospitals and clinics.    Understanding: Patient and Caregiver reports having a clear understanding of the many lifetime commitments involved with being a transplant recipient, including costs, compliance, medications, lab work, procedures, appointments, concrete and financial planning, preparedness, timely and appropriate communication of concerns, abstinence (ETOH, tobacco, illicit non-prescribed drugs), adherence to all health care team recommendations, support system and caregiver involvement, appropriate and timely resource utilization and follow-through, mental health counseling as needed/recommended, and patient and caregiver responsibilities.  Social Service Handbook, resources and detailed educational information provided and reviewed.  Educational information provided.    Patient and Caregiver also reports current and expected compliance with health care regime and states having a clear understanding of the importance of compliance.      Patient and Caregiver reports a clear understanding that risks and benefits may be involved with organ transplantation and with organ donation.       Patient and Caregiver also reports clear understanding that  psychosocial risk factors may affect patient, and include but are not limited to feelings of depression, generalized anxiety, anxiety regarding dependence on others, post traumatic stress disorder, feelings of guilt and other emotional and/or mental concerns, and/or exacerbation of existing mental health concerns.  Detailed resources provided and discussed.      Patient and Caregiver agrees to access appropriate resources in a timely manner as needed and/or as recommended, and to communicate concerns appropriately.  Patient and Caregiver also reports a clear understanding of treatment options available.     Patient and Caregiver received education in a group setting.   reviewed education, provided additional information, and answered questions.    Feelings or Concerns: Pt reports high motivation to pursue organ transplant at this time.    Coping: Identify Patient & Caregiver Strategies to [place]:   1. In the past, coping with major surgery and/or related stress - family support; nora and prayer; exercise treadmill 30-40 mins per day; enjoys fishing    2. Currently & Pre-transplant - family support; nora and prayer; exercise treadmill 30-40 mins per day; enjoys fishing    3. At the time of surgery - family support; nora and prayer   4. During post-Transplant & Recovery Period - family support; nora and prayer    Goals: Pt reports hope for successful organ transplant so he can discontinue dialysis and have healthier life.  Patient referred to Vocational Rehabilitation.    Interview Behavior: Patient and Caregiver presents as alert and oriented x 4, pleasant, good eye contact, well groomed, recall good, concentration/judgement good, average intelligence, calm, communicative, cooperative, and asking and answering questions appropriately. Pt's highly supportive wife Mercedes in session with patient's permission.         Transplant Social Work - Candidacy  Assessment/Plan:     Psychosocial Suitability:  Patient presents as medium risk candidate for kidney transplant at this time. Based on psychosocial risk factors, patient presents as medium risk due to patient having no income at this time. Pt reports relies on step children for all costs. Pt reports is on dialysis, reports plan to apply for disability and reports will notify transplant SW of disability application outcome once known. Pt reports having organ transplant caregiver/transportation plan and medical insurance plan at this time.    Recommendations/Additional Comments: Pt reports is Irish speaking only. Patient utilized family member (wife, Jana Marcelo). Patient did not want to utilize offered  today. Dialysis compliance update requested. Pt to follow up with transplant SW of disability application outcome and update transplant financial plan.    SW recommends that pt conduct fundraising to assist pt with pay for cost of medications, food, gas, and other transplant related needs.  SW recommends that pt remain aware of potential mental health concerns and contact the team if any concerns arise.  SW recommends that pt remain abstinent from tobacco, ETOH, and drug use.  SW supports pt's continued adherence. SW remains available to answer any questions or concerns that arise as the pt moves through the transplant process.      Final determination of transplant candidacy will be reviewed by the selection committee.      Nhi PARKER LCSW

## 2024-01-10 NOTE — PROGRESS NOTES
PRE-TRANSPLANT INFECTIOUS DISEASE CONSULT    Reason for Visit:  Pre-transplant evaluation  Referring Provider: Dr. Katiuska Desouza     History of Present Illness:    53 y.o. male with a history of ESRD on HD 2/2 diabetic nephropathy presents for pre-kidney transplant evaluation. Patient denies dialysis related infections.    Infectious History:  Recent hospital admissions: Yes - November  Recent infections: No  Recent or current antibiotic use: No  History of recurrent infections: No  History of diabetic foot wound or bone/joint infection: No  Recent dental infections, issues or procedures: No  History of chicken pox: unsure  History of shingles: No  History of STI: No  History of COVID infection: No    History of Immunosuppression:  Prior chemotherapy / immunosuppression: No  Prior transplant: No  History of splenectomy: No    Tuberculosis:  Prior screening for latent TB: No  Prior diagnosis of latent TB: No  Risk factors for TB *known exposure, incarceration, homelessness*: No    Geographical exposures:  Currently lives in [place], [place] with wife  Lived in the following states: [place], [place]  Lived or travelled to the [facility] [place]: No  International travel: Born in [place]. 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  Yes - raw oysters   Tobacco: No  Alcohol: seldom  Recreational drug use:  No  Sexual partners: wife      Past Histories:   Past Medical History:   Diagnosis Date    Anemia     CKD (chronic kidney disease), stage V 10/21/2023    Diabetes mellitus     Diabetes mellitus, type 2     H/O esophagogastroduodenoscopy     Hypertension     Obesity     Urinary tract infection     History     Past Surgical History:  "  Procedure Laterality Date    CHOLECYSTECTOMY      ESOPHAGOGASTRODUODENOSCOPY N/A 03/13/2020    Procedure: EGD (ESOPHAGOGASTRODUODENOSCOPY);  Surgeon: Katiuska العراقي MD;  Location: [facility] ENDO;  Service: Endoscopy;  Laterality: N/A;    INSERTION OF TUNNELED CENTRAL VENOUS HEMODIALYSIS CATHETER Right 11/13/2023    Procedure: Insertion, Catheter, Central Venous, Hemodialysis;  Surgeon: Sahsa Galloway MD;  Location: [facility] CATH LAB;  Service: Interventional Nephrology;  Laterality: Right;    LAPAROSCOPIC CHOLECYSTECTOMY N/A 06/22/2020    Procedure: CHOLECYSTECTOMY, LAPAROSCOPIC;  Surgeon: Dayron Conner MD;  Location: [facility] OR;  Service: General;  Laterality: N/A;     Family History   Problem Relation Age of Onset    Diabetes Mother     Pancreatitis Father     Diabetes Brother     Kidney disease Brother     Hypertension Brother     Prostate cancer Neg Hx      Social History     Tobacco Use    Smoking status: Former     Types: Cigarettes    Smokeless tobacco: Never    Tobacco comments:     quit "long time ago"   Substance Use Topics    Alcohol use: Yes     Alcohol/week: 1.0 standard drink of alcohol     Types: 1 Cans of beer per week     Comment: social    Drug use: Never     Review of patient's allergies indicates:  No Known Allergies        Current antibiotics:  Antibiotics (From admission, onward)      None              Review of Systems  Review of Systems   Constitutional: Negative for chills, fever, malaise/fatigue and night sweats.   HENT:  Negative for congestion and sore throat.    Eyes:  Negative for blurred vision and visual disturbance.   Cardiovascular:  Negative for chest pain and leg swelling.   Respiratory:  Negative for cough, shortness of breath and sputum production.    Skin:  Negative for color change, dry skin and itching.   Musculoskeletal:  Negative for back pain, joint pain and joint swelling.   Gastrointestinal:  Negative for abdominal pain, diarrhea, heartburn, nausea and vomiting. " "  Genitourinary:  Negative for dysuria, flank pain and hematuria.   Neurological:  Negative for dizziness, numbness and weakness.   Psychiatric/Behavioral:  Negative for altered mental status and depression. The patient is not nervous/anxious.           Objective  Physical Exam  Constitutional:       General: He is not in acute distress.     Appearance: Normal appearance. He is well-developed. He is not ill-appearing or diaphoretic.   HENT:      Head: Normocephalic and atraumatic.      Right Ear: External ear normal.      Left Ear: External ear normal.      Nose: Nose normal.   Eyes:      General: No scleral icterus.        Right eye: No discharge.         Left eye: No discharge.      Extraocular Movements: Extraocular movements intact.      Conjunctiva/sclera: Conjunctivae normal.   Pulmonary:      Effort: Pulmonary effort is normal. No respiratory distress.      Breath sounds: No stridor.   Skin:     General: Skin is dry.      Coloration: Skin is not jaundiced or pale.      Findings: No erythema.   Neurological:      General: No focal deficit present.      Mental Status: He is alert and oriented to person, place, and time. Mental status is at baseline.   Psychiatric:         Mood and Affect: Mood normal.         Behavior: Behavior normal.         Thought Content: Thought content normal.         Judgment: Judgment normal.           Labs:    CBC:   Lab Results   Component Value Date    WBC 8.87 01/10/2024    HGB 10.1 (L) 01/10/2024    HCT 31.6 (L) 01/10/2024    MCV 83 01/10/2024     01/10/2024    GRAN 5.5 01/10/2024    GRAN 62.3 01/10/2024    LYMPH 1.6 01/10/2024    LYMPH 17.9 (L) 01/10/2024    MONO 1.1 (H) 01/10/2024    MONO 12.1 01/10/2024    EOSINOPHIL 4.2 01/10/2024       Syphilis screening:   Lab Results   Component Value Date    RPR Non-reactive 10/23/2023    FTAABS Non-reactive 10/22/2023        TB screening: No results found for: "TBGOLDPLUS", "TSPOTSCREN"    HIV screening:   Lab Results   Component " "Value Date    [id number] Non-reactive 01/10/2024       Strongyloides IgG: No results found for: "STRONGANTIGG"    Hepatitis Serologies:   Lab Results   Component Value Date    HEPAIGG Reactive 01/10/2024    HEPBCAB Non-reactive 01/10/2024    HEPBSAB <3.00 01/10/2024    HEPBSAB Non-reactive 01/10/2024    HEPCAB Non-reactive 01/10/2024        Varicella IgG: No results found for: "VARICELLAINT"      Immunization History:  Received all childhood vaccines: Yes  All household members receive annual flu vaccine: No  All household members are up to date on COVID vaccine: No  Tdap: > 10 years  Shingles; Pneumococcal: never  HBV: never  There is no immunization history for the selected administration types on file for this patient.       Assessment and Plan    1. Risks of Infection: Available serologies were reviewed. No unusual risks of infection or significant barriers to transplantation were identified from the infectious disease standpoint given the information available at this time.    - Acute infectious issues: None   - Pending serologies: Quantiferon gold / T-spot, RPR, Strongyloides IgG, and VZV IgG   - Please call if any pending serologic testing is positive.    2. Immunizations:  Based on the patient's immunization history and serologies, the following immunizations are recommended:  - Hepatitis A    Patient does have immunity to hepatitis A    Vaccination ordered today: No. Reason for not ordering: immunity demonstrated on serology   - Hepatitis B    Patient does not have immunity to hepatitis B    Vaccination ordered today: Yes   - COVID    Current CDC vaccination recommendations were discussed with the patient   - Annual high dose influenza     Vaccination ordered today: Yes   - Prevnar 20    Vaccination ordered today: Yes   - Tdap    Vaccination ordered today: Yes   - Shingrix    Vaccination ordered today: Yes    Recommended Pre-Transplant Immunization Schedule   Vaccine  0m 1m 2m 6m   Pneumococcal conjugate " vaccine (Prevnar 20) X      Tetanus-diphtheria-pertussis (Tdap)* X      Hepatitis A Vaccine (Havrix)** X   X   Hepatitis B Vaccine (Heplisav)** X X     Influenza (annual) X      Zoster Recombinant Vaccine (Shingrix) X  X           *Administer booster every 10 years.       **Administer if no immunity demonstrated on serologies               Patient will receive vaccines at local pharmacy. A written prescription was provided for all vaccine doses.     3. Counseling:   I discussed with the patient the risk for increased susceptibility to infections following transplantation including increased risk for infection right after transplant and if rejection should occur.  The patient has been counseled on the importance of vaccinations to decrease risk of infection and severe illness. Specific guidance has been provided to the patient regarding the patient's occupation, hobbies and activities to avoid future infectious complications.     4. Transplant Candidacy: Based on available information, there are no identified significant barriers to transplantation from an infectious disease standpoint.  Final determination of transplant candidacy will be made once evaluation is complete and reviewed by the Selection Committee.      Follow up with infectious disease as needed.       The total time for evaluation and management services performed on 01/10/2024 was greater than 45 minutes.

## 2024-01-10 NOTE — PROGRESS NOTES
PHARM.D. PRE-TRANSPLANT NOTE:    This patient's medication therapy was evaluated as part of his pre-transplant evaluation.      The following general pharmacologic concerns were noted: none     The following concerns for post-operative pain management were noted: none     The following pharmacologic concerns related to HCV therapy were noted: none       This patient's medication profile was reviewed for considerations for DAA Hepatitis C therapy:    [X]  No current inducers of CYP 3A4 or PGP  [X]  No amiodarone on this patient's EMR profile in the last 24 months  [X]  No past or current atrial fibrillation on this patient's EMR profile       Current Outpatient Medications   Medication Sig Dispense Refill    amLODIPine (NORVASC) 10 MG tablet Take 1 tablet (10 mg total) by mouth once daily. 30 tablet 1    calcium acetate,phosphat bind, (PHOSLO) 667 mg capsule Take 2 capsules (1,334 mg total) by mouth 3 (three) times daily with meals. 180 capsule 1    carvediloL (COREG) 25 MG tablet Take 1 tablet (25 mg total) by mouth 2 (two) times daily. 60 tablet 1    hydrALAZINE (APRESOLINE) 50 MG tablet Take 1 tablet (50 mg total) by mouth every 8 (eight) hours. 90 tablet 1    sodium bicarbonate 650 MG tablet Take 2 tablets (1,300 mg total) by mouth 3 (three) times daily. 180 tablet 1    sodium zirconium cyclosilicate (LOKELMA) 10 gram packet Take 1 packet (10 g total) by mouth once daily. Mix entire contents of packet(s) into drinking glass containing 3 tablespoons of water; stir well and drink immediately. Add water and repeat until no powder remains to receive entire dose. 30 packet 0     No current facility-administered medications for this visit.           I am available for consultation and can be contacted, as needed by the other members of the Transplant team.

## 2024-01-10 NOTE — PROGRESS NOTES
INITIAL PATIENT EDUCATION NOTE     Mr. Wai Bain was seen in pre-kidney transplant clinic for evaluation for kidney, kidney/pancreas or pancreas only transplant.  The patient attended an individual video education session that discussed/reviewed the following aspects of transplantation: evaluation including diagnostic and laboratory testing,(Chemistries, Hematology, Serologies including HIV and Hepatitis and HLA) required for transplantation and selection committee process, [facility] waitlist management/multiple listings, types of organs offered (KDPI < 85%, KDPI > 85%, PHS risk, DCD, HCV+, HIV+ for HIV+ recipients and enbloc/dual), financial aspects, surgical procedures, dietary instruction pre- and post-transplant, health maintenance pre- and post-transplant, post-transplant hospitalization and outpatient follow-up, potential to participate in a research protocol, and medication management and side effects.  A question and answer session was provided after the presentation.    The patient was seen by all members of the multi-disciplinary team to include: Nephrologist/ALLISON, Surgeon, , Transplant Coordinator, , Pharmacist and Dietician (if applicable).    The patient reviewed and signed all consents for evaluation which were witnessed and sent to scanning into the [facility] chart.    The patient was given an education book and plan for further evaluation based on his individual assessment. Patient refused  for RR clinic visit. All teaching and education was translated through wife who speaks and understands English.    Reviewed program requirement for complete COVID vaccination with documentation prior to listing.  COVID education information reviewed with patient. Patient encouraged to be up to date on all vaccinations.     The patient was informed that the transplant team would manage immediate post op pain. If the patient requires long term pain management, they  will need to have that pain management addressed by their PCP or previous provider who wrote for long term pain medicines.    The patient was encouraged to call with any questions or concerns.

## 2024-01-11 ENCOUNTER — PROCEDURE VISIT (OUTPATIENT)
Dept: OPHTHALMOLOGY | Facility: CLINIC | Age: 54
End: 2024-01-11
Payer: MEDICAID

## 2024-01-11 ENCOUNTER — TELEPHONE (OUTPATIENT)
Dept: OPHTHALMOLOGY | Facility: CLINIC | Age: 54
End: 2024-01-11

## 2024-01-11 DIAGNOSIS — H43.13 VITREOUS HEMORRHAGE, BILATERAL: ICD-10-CM

## 2024-01-11 DIAGNOSIS — E11.3513 TYPE 2 DIABETES MELLITUS WITH BOTH EYES AFFECTED BY PROLIFERATIVE RETINOPATHY AND MACULAR EDEMA, WITHOUT LONG-TERM CURRENT USE OF INSULIN: Primary | ICD-10-CM

## 2024-01-11 DIAGNOSIS — H25.13 NS (NUCLEAR SCLEROSIS), BILATERAL: ICD-10-CM

## 2024-01-11 DIAGNOSIS — H43.13 VITREOUS HEMORRHAGE OF BOTH EYES: Primary | ICD-10-CM

## 2024-01-11 PROCEDURE — 92014 COMPRE OPH EXAM EST PT 1/>: CPT | Mod: 25,S$PBB,TXP, | Performed by: OPHTHALMOLOGY

## 2024-01-11 PROCEDURE — 92134 CPTRZ OPH DX IMG PST SGM RTA: CPT | Mod: PBBFAC,TXP | Performed by: OPHTHALMOLOGY

## 2024-01-11 PROCEDURE — 99999PBSHW PR PBB SHADOW TECHNICAL ONLY FILED TO HB: Mod: PBBFAC,TXP,,

## 2024-01-11 RX ADMIN — Medication 1.25 MG: at 12:01

## 2024-01-11 NOTE — PROGRESS NOTES
HPI     1 month  DM    AND AVASTIN OU      Additional comments: DM   LAST BS   UNSURE   LAST A1C   UNSURE  DFE   AVASTIN INJECTION            Comments    Patient here today for 1 mo f/u Avastin ou. Patient states OS improved,   but OD is still blurry and has noticeable blood, no pain and flashes OD.    AT ou prn  DLS  12/17/23          Last edited by Tiffani Bain on 1/11/2024 10:40 AM.        OCT - Complex DME OU  OD>OS    Prior WFFA - NV OU with late macular leakage OU        A/P    PDR OU  T2 uncontrolled without insulin  10/23 not seen x 8 months  Worsening VH OU    Will need PRP OU possible PPV OU if NI in heme    Plan PRP OS  Then PPV OD to follow    Plan 25g PPV/EL/partial AFx/Avastin OD for NCVH OD    Local MAC  LOC 45 min    Risks, benefits, and alternatives to treatment discussed in detail with the patient.  The patient voiced understanding and wished to proceed with the procedure        2. Complex DME OU  S/p Avastin OU x 5    Avastin OU today    Plan starting PRP OS soon - next visit      3. HTN Ret OU  BS/BP/chol control    4. Early NS OU      1 month OCT and dilate      Risks, benefits, and alternatives to treatment discussed in detail with the patient.  The patient voiced understanding and wished to proceed with the procedure    Injection Procedure Note:  Diagnosis: DME OU    Patient Identified and Time Out complete  Pt marked  Topical Proparacaine and Betadine.  Inject Avastin OU at 6:00 @ 3.5-4mm posterior to limbus  Post Operative Dx: Same  Complications: None  Follow up as above.

## 2024-01-17 ENCOUNTER — DOCUMENTATION ONLY (OUTPATIENT)
Dept: TRANSPLANT | Facility: CLINIC | Age: 54
End: 2024-01-17
Payer: MEDICAID

## 2024-01-17 NOTE — PROGRESS NOTES
Nutritional assessment from dialysis unit received and reviewed--no nutritional changes to plan needed at this time.  Pt to continue to follow-up with renal dietitians recommendations.

## 2024-01-22 DIAGNOSIS — Z76.82 ORGAN TRANSPLANT CANDIDATE: Primary | ICD-10-CM

## 2024-01-25 ENCOUNTER — TELEPHONE (OUTPATIENT)
Dept: ENDOSCOPY | Facility: HOSPITAL | Age: 54
End: 2024-01-25
Payer: MEDICAID

## 2024-01-25 ENCOUNTER — PATIENT MESSAGE (OUTPATIENT)
Dept: ENDOSCOPY | Facility: HOSPITAL | Age: 54
End: 2024-01-25
Payer: MEDICAID

## 2024-01-25 DIAGNOSIS — N18.6 ESRD (END STAGE RENAL DISEASE): Primary | ICD-10-CM

## 2024-01-25 DIAGNOSIS — Z12.11 SPECIAL SCREENING FOR MALIGNANT NEOPLASMS, COLON: Primary | ICD-10-CM

## 2024-01-25 RX ORDER — SODIUM, POTASSIUM,MAG SULFATES 17.5-3.13G
1 SOLUTION, RECONSTITUTED, ORAL ORAL DAILY
Qty: 1 KIT | Refills: 0 | Status: SHIPPED | OUTPATIENT
Start: 2024-01-25 | End: 2024-01-27

## 2024-01-25 NOTE — TELEPHONE ENCOUNTER
Spoke to wife to schedule procedure(s) Colonoscopy       Physician to perform procedure(s) Dr. DANNA Chavarria  Date of Procedure (s) 5/15/24  Arrival Time 11:00 AM  Time of Procedure(s) 12:00 AM   Location of Procedure(s) [place] 4th Floor  Type of Rx Prep sent to patient: Suprep  Instructions provided to patient via [facility]    Patient was informed on the following information and verbalized understanding. Screening questionnaire reviewed with patient and complete. If procedure requires anesthesia, a responsible adult needs to be present to accompany the patient home, patient cannot drive after receiving anesthesia. Appointment details are tentative, especially check-in time. Patient will receive a prep-op call 7 days prior to confirm check-in time for procedure. If applicable the patient should contact their pharmacy to verify Rx for procedure prep is ready for pick-up. Patient was advised to call the scheduling department at [phone] if pharmacy states no Rx is available. Patient was advised to call the endoscopy scheduling department if any questions or concerns arise.      [facility] Scheduling Department

## 2024-01-26 RX ORDER — AMLODIPINE BESYLATE 10 MG/1
10 TABLET ORAL
Qty: 30 TABLET | Refills: 0 | OUTPATIENT
Start: 2024-01-26

## 2024-01-29 ENCOUNTER — TELEPHONE (OUTPATIENT)
Dept: CARDIOLOGY | Facility: HOSPITAL | Age: 54
End: 2024-01-29
Payer: MEDICAID

## 2024-02-01 ENCOUNTER — OFFICE VISIT (OUTPATIENT)
Dept: OPHTHALMOLOGY | Facility: CLINIC | Age: 54
End: 2024-02-01
Payer: MEDICAID

## 2024-02-01 DIAGNOSIS — E11.3513 TYPE 2 DIABETES MELLITUS WITH BOTH EYES AFFECTED BY PROLIFERATIVE RETINOPATHY AND MACULAR EDEMA, WITHOUT LONG-TERM CURRENT USE OF INSULIN: Primary | ICD-10-CM

## 2024-02-01 DIAGNOSIS — H43.13 VITREOUS HEMORRHAGE, BILATERAL: ICD-10-CM

## 2024-02-01 PROCEDURE — 99212 OFFICE O/P EST SF 10 MIN: CPT | Mod: PBBFAC,25,NTX | Performed by: OPHTHALMOLOGY

## 2024-02-01 PROCEDURE — 99999 PR PBB SHADOW E&M-EST. PATIENT-LVL II: CPT | Mod: PBBFAC,TXP,, | Performed by: OPHTHALMOLOGY

## 2024-02-01 PROCEDURE — 67228 TREATMENT X10SV RETINOPATHY: CPT | Mod: PBBFAC,LT,NTX | Performed by: OPHTHALMOLOGY

## 2024-02-01 PROCEDURE — 67228 TREATMENT X10SV RETINOPATHY: CPT | Mod: S$PBB,LT,TXP, | Performed by: OPHTHALMOLOGY

## 2024-02-01 PROCEDURE — 99499 UNLISTED E&M SERVICE: CPT | Mod: S$PBB,TXP,, | Performed by: OPHTHALMOLOGY

## 2024-02-12 ENCOUNTER — OFFICE VISIT (OUTPATIENT)
Dept: INFECTIOUS DISEASES | Facility: CLINIC | Age: 54
End: 2024-02-12
Payer: MEDICAID

## 2024-02-12 DIAGNOSIS — Z22.7 LATENT TUBERCULOSIS: Primary | ICD-10-CM

## 2024-02-12 DIAGNOSIS — I10 ESSENTIAL HYPERTENSION: ICD-10-CM

## 2024-02-12 PROCEDURE — 1159F MED LIST DOCD IN RCRD: CPT | Mod: CPTII,95,TXP, | Performed by: PHYSICIAN ASSISTANT

## 2024-02-12 PROCEDURE — 1160F RVW MEDS BY RX/DR IN RCRD: CPT | Mod: CPTII,95,TXP, | Performed by: PHYSICIAN ASSISTANT

## 2024-02-12 PROCEDURE — 99214 OFFICE O/P EST MOD 30 MIN: CPT | Mod: 95,TXP,, | Performed by: PHYSICIAN ASSISTANT

## 2024-02-12 PROCEDURE — 3044F HG A1C LEVEL LT 7.0%: CPT | Mod: CPTII,95,TXP, | Performed by: PHYSICIAN ASSISTANT

## 2024-02-12 RX ORDER — RIFAMPIN 300 MG/1
600 CAPSULE ORAL DAILY
Qty: 240 CAPSULE | Refills: 0 | Status: SHIPPED | OUTPATIENT
Start: 2024-02-12 | End: 2024-06-11

## 2024-02-12 NOTE — PROGRESS NOTES
Audio Only Telehealth Visit     The patient location is: home  The chief complaint leading to consultation is: positive quantiferon gold  Visit type: Virtual visit with audio only (telephone)  Total time spent with patient: 30     The reason for the audio only service rather than synchronous audio and video virtual visit was related to technical difficulties.     Each patient to whom I provide medical services by telemedicine is:  (1) informed of the relationship between the physician and patient and the respective role of any other health care provider with respect to management of the patient; and (2) notified that they may decline to receive medical services by telemedicine and may withdraw from such care at any time. Patient verbally consented to receive this service via voice-only telephone call.         HPI:   Wai Bain is a 53 year old male with history of ESRD on HD seen today for evaluation of positive Quantiferon gold. History obtained and visit assisted with assistance of . Quantiferon was positive on 1/10/24.  There is no known history of exposure to TB. Current symptoms: dry cough. Patient denies diarrhea, fatigue, fever, night sweats, and weight loss. Chest x-ray was done 1/10/24 and result was negative.       ASSESSMENT:  Latent tuberculosis infection (LTBI).  No evidence of active TB.    RECOMMENDATIONS:  The patient has been counseled regarding risks and benefits of prophylactic therapy.   The patient would like to take medication for latent TB.  A prescription for rifampin 600 mg every day for 4 months will be provided to the patient  The patient was counseled about possible side effects related to the medication. He will monitor his blood pressure closely.  The patient was advised to do monthly ALTs.   The patient was asked to let me know if there are any problems tolerating the medication.    The majority of this visit was spent reviewing results, discussing the implications,  and answering the patients questions regarding latent TB.                       This service was not originating from a related E/M service provided within the previous 7 days nor will  to an E/M service or procedure within the next 24 hours or my soonest available appointment.  Prevailing standard of care was able to be met in this audio-only visit.

## 2024-02-16 ENCOUNTER — TELEPHONE (OUTPATIENT)
Dept: TRANSPLANT | Facility: CLINIC | Age: 54
End: 2024-02-16
Payer: MEDICARE

## 2024-02-16 DIAGNOSIS — Z76.82 ORGAN TRANSPLANT CANDIDATE: Primary | ICD-10-CM

## 2024-03-07 ENCOUNTER — OFFICE VISIT (OUTPATIENT)
Dept: OPHTHALMOLOGY | Facility: CLINIC | Age: 54
End: 2024-03-07
Payer: MEDICAID

## 2024-03-07 DIAGNOSIS — H43.13 VITREOUS HEMORRHAGE, BILATERAL: ICD-10-CM

## 2024-03-07 DIAGNOSIS — E11.3513 TYPE 2 DIABETES MELLITUS WITH BOTH EYES AFFECTED BY PROLIFERATIVE RETINOPATHY AND MACULAR EDEMA, WITHOUT LONG-TERM CURRENT USE OF INSULIN: Primary | ICD-10-CM

## 2024-03-07 DIAGNOSIS — H35.033 HYPERTENSIVE RETINOPATHY, BILATERAL: ICD-10-CM

## 2024-03-07 PROCEDURE — 1159F MED LIST DOCD IN RCRD: CPT | Mod: CPTII,TXP,, | Performed by: OPHTHALMOLOGY

## 2024-03-07 PROCEDURE — 67028 INJECTION EYE DRUG: CPT | Mod: 50,S$PBB,TXP, | Performed by: OPHTHALMOLOGY

## 2024-03-07 PROCEDURE — 67028 INJECTION EYE DRUG: CPT | Mod: 50,PBBFAC,TXP | Performed by: OPHTHALMOLOGY

## 2024-03-07 PROCEDURE — 3044F HG A1C LEVEL LT 7.0%: CPT | Mod: CPTII,TXP,, | Performed by: OPHTHALMOLOGY

## 2024-03-07 PROCEDURE — 99999 PR PBB SHADOW E&M-EST. PATIENT-LVL II: CPT | Mod: PBBFAC,TXP,, | Performed by: OPHTHALMOLOGY

## 2024-03-07 PROCEDURE — 1160F RVW MEDS BY RX/DR IN RCRD: CPT | Mod: CPTII,TXP,, | Performed by: OPHTHALMOLOGY

## 2024-03-07 PROCEDURE — 99212 OFFICE O/P EST SF 10 MIN: CPT | Mod: PBBFAC,TXP | Performed by: OPHTHALMOLOGY

## 2024-03-07 PROCEDURE — 92134 CPTRZ OPH DX IMG PST SGM RTA: CPT | Mod: PBBFAC,NTX | Performed by: OPHTHALMOLOGY

## 2024-03-07 PROCEDURE — 99999PBSHW PR PBB SHADOW TECHNICAL ONLY FILED TO HB: Mod: JZ,PBBFAC,TXP,

## 2024-03-07 PROCEDURE — 92012 INTRM OPH EXAM EST PATIENT: CPT | Mod: 25,S$PBB,TXP, | Performed by: OPHTHALMOLOGY

## 2024-03-07 RX ADMIN — Medication 1.25 MG: at 04:03

## 2024-03-07 NOTE — PROGRESS NOTES
HPI    DLS: 2/1/24    No eyedrops    Pt here for laser OD and injection OD.  Pt states VA OD has improved. Pt   denies flashes or floaters.    Last edited by Kasey Ross MA on 3/7/2024  3:00 PM.            Prior OCT - Complex DME OU  OD>OS    Prior WFFA - NV OU with late macular leakage OU        A/P    PDR OU  T2 uncontrolled without insulin  10/23 not seen x 8 months  Worsening VH OU    Will need PRP OU possible PPV OU if NI in heme    Plan PRP OS complete  Then PPV OD to follow    Plan 25g PPV/EL/partial AFx/Avastin OD for NCVH OD    Local MAC  LOC 45 min    Risks, benefits, and alternatives to treatment discussed in detail with the patient.  The patient voiced understanding and wished to proceed with the procedure        2. Complex DME OU  S/p Avastin OU x 6    Avastin OU today      3. HTN Ret OU  BS/BP/chol control    4. Early NS OU      To OR next week    Laser Procedure Note  Dx: PDR OS  Laser: PRP OS  Argon  Spot: 200  Power: 150-200  Dur: 0.15s  #:  [id number]  Complications: None  F/U as above          Risks, benefits, and alternatives to treatment discussed in detail with the patient.  The patient voiced understanding and wished to proceed with the procedure    Injection Procedure Note:  Diagnosis: DME OU    Patient Identified and Time Out complete  Pt marked  Topical Proparacaine and Betadine.  Inject Avastin OU at 6:00 @ 3.5-4mm posterior to limbus  Post Operative Dx: Same  Complications: None  Follow up as above.

## 2024-03-08 ENCOUNTER — PATIENT MESSAGE (OUTPATIENT)
Dept: TRANSPLANT | Facility: CLINIC | Age: 54
End: 2024-03-08
Payer: MEDICARE

## 2024-03-08 ENCOUNTER — TELEPHONE (OUTPATIENT)
Dept: TRANSPLANT | Facility: CLINIC | Age: 54
End: 2024-03-08
Payer: MEDICARE

## 2024-03-08 NOTE — TELEPHONE ENCOUNTER
Attempted to contact pt's wife in regards to upcoming appts. Left detailed message that today's appt was rescheduled until 4/2. Message sent in portal and reminder mailed.

## 2024-03-11 ENCOUNTER — TELEPHONE (OUTPATIENT)
Dept: INFECTIOUS DISEASES | Facility: CLINIC | Age: 54
End: 2024-03-11

## 2024-03-11 NOTE — TELEPHONE ENCOUNTER
Called patient,   No answer  Left a voice message to call me back    ----- Message from Mandy Escobar PA-C sent at 3/11/2024  1:55 PM CDT -----  Hi! Can you call him and make sure he is taking his medications for latent tuberculosis and remind him to get his blood work done? He is [place] speaking. Thank you very much!

## 2024-03-11 NOTE — PRE-PROCEDURE INSTRUCTIONS
"PREOP INSTRUCTIONS TO PATIENT"S WIFE:  No food,milk or milk products for 8 hours before surgery.  Clear liquids like water,gatorade,apple juice are allowed up until 2 hours before surgery.  Instructed to follow the surgeon's instructions if they differ from these.  Shower instructions as well as directions to the Surgery Center were given.  Encouraged to wear loose fitting,comfortable clothing.  Medication instructions for pm prior to and am of procedure reviewed.  Instructed to avoid taking vitamins,supplements,aspirin and ibuprofen the morning of surgery.    Patient's wife denies any side effects or issues with anesthesia or sedation.     Patient's wife does not know arrival time.Explained that this information comes from the surgeon's office and if they haven't heard from them by 2 or 3 pm 3/12/2024 to call the office.Patient's wife stated an understanding.   "

## 2024-03-12 ENCOUNTER — TELEPHONE (OUTPATIENT)
Dept: OPHTHALMOLOGY | Facility: CLINIC | Age: 54
End: 2024-03-12
Payer: MEDICARE

## 2024-03-12 NOTE — H&P
"Pre-Operative History & Physical  Ophthalmology      SUBJECTIVE:     History of Present Illness:  Patient is a 54 y.o. male presents with Vitreous hemorrhage of both eyes [H43.13].    MEDICATIONS:   No medications prior to admission.       ALLERGIES: Review of patient's allergies indicates:  No Known Allergies    PAST MEDICAL HISTORY:   Past Medical History:   Diagnosis Date    Anemia     CKD (chronic kidney disease), stage V 10/21/2023    Diabetes mellitus     Diabetes mellitus, type 2     H/O esophagogastroduodenoscopy     Hypertension     Obesity     Urinary tract infection     History     PAST SURGICAL HISTORY:   Past Surgical History:   Procedure Laterality Date    CHOLECYSTECTOMY      ESOPHAGOGASTRODUODENOSCOPY N/A 03/13/2020    Procedure: EGD (ESOPHAGOGASTRODUODENOSCOPY);  Surgeon: Katiuska العراقي MD;  Location: [facility] ENDO;  Service: Endoscopy;  Laterality: N/A;    INSERTION OF TUNNELED CENTRAL VENOUS HEMODIALYSIS CATHETER Right 11/13/2023    Procedure: Insertion, Catheter, Central Venous, Hemodialysis;  Surgeon: Sasha Galloway MD;  Location: [facility] CATH LAB;  Service: Interventional Nephrology;  Laterality: Right;    LAPAROSCOPIC CHOLECYSTECTOMY N/A 06/22/2020    Procedure: CHOLECYSTECTOMY, LAPAROSCOPIC;  Surgeon: Dayron Conner MD;  Location: [facility] OR;  Service: General;  Laterality: N/A;     PAST FAMILY HISTORY:   Family History   Problem Relation Age of Onset    Diabetes Mother     Pancreatitis Father     Diabetes Brother     Kidney disease Brother     Hypertension Brother     Prostate cancer Neg Hx      SOCIAL HISTORY:   Social History     Tobacco Use    Smoking status: Former     Types: Cigarettes    Smokeless tobacco: Never    Tobacco comments:     quit "long time ago"   Substance Use Topics    Alcohol use: Yes     Alcohol/week: 1.0 standard drink of alcohol     Types: 1 Cans of beer per week     Comment: social    Drug use: Never        MENTAL STATUS: Alert    REVIEW OF SYSTEMS: " Negative    OBJECTIVE:     Vital Signs (Most Recent)       Physical Exam:  General: NAD  HEENT: AT/NC, NCVH OD  Lungs: Adequate respirations  Heart: + pulses  Abdomen: Soft    ASSESSMENT/PLAN:     Patient is a 54 y.o. male with Vitreous hemorrhage of both eyes [H43.13]      - Risks/benefits/alternatives of the procedure including, but not limited to, infection, bleeding, pain, ptosis, macular edema, corneal edema, persistent corneal defect, retinal tears, retinal detachment, epiretinal membrane, elevated intraocular pressure, hypotony, cataract formation, possible need for strict post-op head positioning, possible temporary avoidance of air travel, loss of vision, loss of the eye, paralysis, and death were discussed with the patient and/or family. The patient/family voiced good understanding, the informed consent was signed, witnessed, and placed in chart. All patient and family questions were answered.   - Will proceed with 25G PPV/EL/partial AFx/Avastin OD  - Plan for MAC with retrobulbar block anesthesia   - Allergies reviewed: Review of patient's allergies indicates:  No Known Allergies      Supplies Needed  Retrobulbar block, 25 gauge vitrectomy, Dextrose in bag, Laser probe/pedal, and Avastin      Momo Martinez MD  Vitreoretinal Surgery   Department of Ophthalmology

## 2024-03-13 ENCOUNTER — HOSPITAL ENCOUNTER (OUTPATIENT)
Facility: HOSPITAL | Age: 54
Discharge: HOME | End: 2024-03-13
Attending: OPHTHALMOLOGY | Admitting: OPHTHALMOLOGY
Payer: MEDICAID

## 2024-03-13 ENCOUNTER — ANESTHESIA EVENT (OUTPATIENT)
Dept: SURGERY | Facility: HOSPITAL | Age: 54
End: 2024-03-13
Payer: MEDICAID

## 2024-03-13 ENCOUNTER — ANESTHESIA (OUTPATIENT)
Dept: SURGERY | Facility: HOSPITAL | Age: 54
End: 2024-03-13
Payer: MEDICAID

## 2024-03-13 VITALS
SYSTOLIC BLOOD PRESSURE: 169 MMHG | RESPIRATION RATE: 18 BRPM | TEMPERATURE: 98 F | BODY MASS INDEX: 35.7 KG/M2 | DIASTOLIC BLOOD PRESSURE: 77 MMHG | WEIGHT: 228 LBS | OXYGEN SATURATION: 99 % | HEART RATE: 64 BPM

## 2024-03-13 DIAGNOSIS — E11.3513 TYPE 2 DIABETES MELLITUS WITH BOTH EYES AFFECTED BY PROLIFERATIVE RETINOPATHY AND MACULAR EDEMA, WITHOUT LONG-TERM CURRENT USE OF INSULIN: ICD-10-CM

## 2024-03-13 DIAGNOSIS — H43.11 VITREOUS HEMORRHAGE, RIGHT: ICD-10-CM

## 2024-03-13 DIAGNOSIS — H43.13 VITREOUS HEMORRHAGE, BILATERAL: Primary | ICD-10-CM

## 2024-03-13 PROCEDURE — 36000706: Mod: NTX | Performed by: OPHTHALMOLOGY

## 2024-03-13 PROCEDURE — 99499 UNLISTED E&M SERVICE: CPT | Mod: NTX,,, | Performed by: STUDENT IN AN ORGANIZED HEALTH CARE EDUCATION/TRAINING PROGRAM

## 2024-03-13 PROCEDURE — 71000044 HC DOSC ROUTINE RECOVERY FIRST HOUR: Mod: NTX | Performed by: OPHTHALMOLOGY

## 2024-03-13 PROCEDURE — 63600175 PHARM REV CODE 636 W HCPCS: Mod: TXP | Performed by: STUDENT IN AN ORGANIZED HEALTH CARE EDUCATION/TRAINING PROGRAM

## 2024-03-13 PROCEDURE — 63600175 PHARM REV CODE 636 W HCPCS: Mod: TXP | Performed by: OPHTHALMOLOGY

## 2024-03-13 PROCEDURE — 36000707: Mod: TXP | Performed by: OPHTHALMOLOGY

## 2024-03-13 PROCEDURE — 27201423 OPTIME MED/SURG SUP & DEVICES STERILE SUPPLY: Mod: TXP | Performed by: OPHTHALMOLOGY

## 2024-03-13 PROCEDURE — 63600175 PHARM REV CODE 636 W HCPCS: Mod: JZ,JG,NTX

## 2024-03-13 PROCEDURE — C1784 OCULAR DEV, INTRAOP, DET RET: HCPCS | Mod: TXP | Performed by: OPHTHALMOLOGY

## 2024-03-13 PROCEDURE — 25000003 PHARM REV CODE 250: Mod: NTX | Performed by: OPHTHALMOLOGY

## 2024-03-13 PROCEDURE — 37000009 HC ANESTHESIA EA ADD 15 MINS: Mod: TXP | Performed by: OPHTHALMOLOGY

## 2024-03-13 PROCEDURE — 37000008 HC ANESTHESIA 1ST 15 MINUTES: Mod: TXP | Performed by: OPHTHALMOLOGY

## 2024-03-13 PROCEDURE — 25000003 PHARM REV CODE 250: Mod: NTX | Performed by: STUDENT IN AN ORGANIZED HEALTH CARE EDUCATION/TRAINING PROGRAM

## 2024-03-13 PROCEDURE — 25000003 PHARM REV CODE 250: Mod: TXP | Performed by: STUDENT IN AN ORGANIZED HEALTH CARE EDUCATION/TRAINING PROGRAM

## 2024-03-13 PROCEDURE — 71000015 HC POSTOP RECOV 1ST HR: Mod: NTX | Performed by: OPHTHALMOLOGY

## 2024-03-13 PROCEDURE — D9220A PRA ANESTHESIA: Mod: ANES,NTX,, | Performed by: ANESTHESIOLOGY

## 2024-03-13 PROCEDURE — D9220A PRA ANESTHESIA: Mod: CRNA,NTX,, | Performed by: STUDENT IN AN ORGANIZED HEALTH CARE EDUCATION/TRAINING PROGRAM

## 2024-03-13 PROCEDURE — 67040 LASER TREATMENT OF RETINA: CPT | Mod: RT,NTX,, | Performed by: OPHTHALMOLOGY

## 2024-03-13 RX ORDER — ACETAMINOPHEN 325 MG/1
650 TABLET ORAL EVERY 4 HOURS PRN
Status: DISCONTINUED | OUTPATIENT
Start: 2024-03-13 | End: 2024-03-13 | Stop reason: HOSPADM

## 2024-03-13 RX ORDER — EPINEPHRINE 1 MG/ML
INJECTION, SOLUTION, CONCENTRATE INTRAVENOUS
Status: DISCONTINUED
Start: 2024-03-13 | End: 2024-03-13 | Stop reason: HOSPADM

## 2024-03-13 RX ORDER — MIDAZOLAM HYDROCHLORIDE 1 MG/ML
INJECTION, SOLUTION INTRAMUSCULAR; INTRAVENOUS
Status: DISCONTINUED | OUTPATIENT
Start: 2024-03-13 | End: 2024-03-13

## 2024-03-13 RX ORDER — ATROPINE SULFATE 10 MG/ML
1 SOLUTION/ DROPS OPHTHALMIC
Status: DISCONTINUED | OUTPATIENT
Start: 2024-03-13 | End: 2024-03-13 | Stop reason: HOSPADM

## 2024-03-13 RX ORDER — PHENYLEPHRINE HYDROCHLORIDE 25 MG/ML
1 SOLUTION/ DROPS OPHTHALMIC
Status: DISCONTINUED | OUTPATIENT
Start: 2024-03-13 | End: 2024-03-13 | Stop reason: HOSPADM

## 2024-03-13 RX ORDER — OXYCODONE AND ACETAMINOPHEN 5; 325 MG/1; MG/1
1 TABLET ORAL EVERY 6 HOURS PRN
Qty: 12 TABLET | Refills: 0 | Status: SHIPPED | OUTPATIENT
Start: 2024-03-13

## 2024-03-13 RX ORDER — TETRACAINE HYDROCHLORIDE 5 MG/ML
SOLUTION OPHTHALMIC
Status: DISCONTINUED | OUTPATIENT
Start: 2024-03-13 | End: 2024-03-13 | Stop reason: HOSPADM

## 2024-03-13 RX ORDER — INDOCYANINE GREEN AND WATER 25 MG
KIT INJECTION
Status: DISCONTINUED
Start: 2024-03-13 | End: 2024-03-13 | Stop reason: HOSPADM

## 2024-03-13 RX ORDER — LIDOCAINE HYDROCHLORIDE 20 MG/ML
INJECTION, SOLUTION EPIDURAL; INFILTRATION; INTRACAUDAL; PERINEURAL
Status: DISCONTINUED
Start: 2024-03-13 | End: 2024-03-13 | Stop reason: HOSPADM

## 2024-03-13 RX ORDER — EPINEPHRINE 1 MG/ML
INJECTION, SOLUTION, CONCENTRATE INTRAVENOUS
Status: DISCONTINUED | OUTPATIENT
Start: 2024-03-13 | End: 2024-03-13 | Stop reason: HOSPADM

## 2024-03-13 RX ORDER — HYDROCODONE BITARTRATE AND ACETAMINOPHEN 5; 325 MG/1; MG/1
1 TABLET ORAL EVERY 4 HOURS PRN
Status: DISCONTINUED | OUTPATIENT
Start: 2024-03-13 | End: 2024-03-13 | Stop reason: HOSPADM

## 2024-03-13 RX ORDER — VANCOMYCIN HYDROCHLORIDE 500 MG/10ML
INJECTION, POWDER, LYOPHILIZED, FOR SOLUTION INTRAVENOUS
Status: DISCONTINUED
Start: 2024-03-13 | End: 2024-03-13 | Stop reason: HOSPADM

## 2024-03-13 RX ORDER — PREDNISOLONE ACETATE 10 MG/ML
1 SUSPENSION/ DROPS OPHTHALMIC
Status: DISCONTINUED | OUTPATIENT
Start: 2024-03-13 | End: 2024-03-13 | Stop reason: HOSPADM

## 2024-03-13 RX ORDER — MOXIFLOXACIN 5 MG/ML
1 SOLUTION/ DROPS OPHTHALMIC
Status: DISCONTINUED | OUTPATIENT
Start: 2024-03-13 | End: 2024-03-13 | Stop reason: HOSPADM

## 2024-03-13 RX ORDER — LIDOCAINE HYDROCHLORIDE 20 MG/ML
INJECTION INTRAVENOUS
Status: DISCONTINUED | OUTPATIENT
Start: 2024-03-13 | End: 2024-03-13

## 2024-03-13 RX ORDER — BUPIVACAINE HYDROCHLORIDE 7.5 MG/ML
INJECTION, SOLUTION EPIDURAL; RETROBULBAR
Status: DISCONTINUED | OUTPATIENT
Start: 2024-03-13 | End: 2024-03-13 | Stop reason: HOSPADM

## 2024-03-13 RX ORDER — FENTANYL CITRATE 50 UG/ML
INJECTION, SOLUTION INTRAMUSCULAR; INTRAVENOUS
Status: DISCONTINUED | OUTPATIENT
Start: 2024-03-13 | End: 2024-03-13

## 2024-03-13 RX ORDER — DEXTROSE MONOHYDRATE 5 G/100ML
INJECTION INTRAVENOUS
Status: COMPLETED | OUTPATIENT
Start: 2024-03-13 | End: 2024-03-13

## 2024-03-13 RX ORDER — BUPIVACAINE HYDROCHLORIDE 7.5 MG/ML
INJECTION, SOLUTION EPIDURAL; RETROBULBAR
Status: DISCONTINUED
Start: 2024-03-13 | End: 2024-03-13 | Stop reason: HOSPADM

## 2024-03-13 RX ORDER — DEXAMETHASONE SODIUM PHOSPHATE 4 MG/ML
INJECTION, SOLUTION INTRA-ARTICULAR; INTRALESIONAL; INTRAMUSCULAR; INTRAVENOUS; SOFT TISSUE
Status: DISCONTINUED
Start: 2024-03-13 | End: 2024-03-13 | Stop reason: HOSPADM

## 2024-03-13 RX ORDER — NEOMYCIN SULFATE, POLYMYXIN B SULFATE, AND DEXAMETHASONE 3.5; 10000; 1 MG/G; [USP'U]/G; MG/G
OINTMENT OPHTHALMIC
Status: DISCONTINUED | OUTPATIENT
Start: 2024-03-13 | End: 2024-03-13 | Stop reason: HOSPADM

## 2024-03-13 RX ORDER — DEXAMETHASONE SODIUM PHOSPHATE 4 MG/ML
INJECTION, SOLUTION INTRA-ARTICULAR; INTRALESIONAL; INTRAMUSCULAR; INTRAVENOUS; SOFT TISSUE
Status: DISCONTINUED | OUTPATIENT
Start: 2024-03-13 | End: 2024-03-13 | Stop reason: HOSPADM

## 2024-03-13 RX ORDER — PROPOFOL 10 MG/ML
VIAL (ML) INTRAVENOUS
Status: DISCONTINUED | OUTPATIENT
Start: 2024-03-13 | End: 2024-03-13

## 2024-03-13 RX ORDER — VANCOMYCIN HYDROCHLORIDE 500 MG/10ML
INJECTION, POWDER, LYOPHILIZED, FOR SOLUTION INTRAVENOUS
Status: DISCONTINUED | OUTPATIENT
Start: 2024-03-13 | End: 2024-03-13 | Stop reason: HOSPADM

## 2024-03-13 RX ORDER — NEOMYCIN SULFATE, POLYMYXIN B SULFATE, AND DEXAMETHASONE 3.5; 10000; 1 MG/G; [USP'U]/G; MG/G
OINTMENT OPHTHALMIC
Status: DISCONTINUED
Start: 2024-03-13 | End: 2024-03-13 | Stop reason: HOSPADM

## 2024-03-13 RX ORDER — ONDANSETRON 4 MG/1
4 TABLET, FILM COATED ORAL EVERY 8 HOURS PRN
Qty: 12 TABLET | Refills: 0 | Status: SHIPPED | OUTPATIENT
Start: 2024-03-13

## 2024-03-13 RX ORDER — LIDOCAINE HYDROCHLORIDE 20 MG/ML
INJECTION, SOLUTION EPIDURAL; INFILTRATION; INTRACAUDAL; PERINEURAL
Status: DISCONTINUED | OUTPATIENT
Start: 2024-03-13 | End: 2024-03-13 | Stop reason: HOSPADM

## 2024-03-13 RX ORDER — SODIUM CHLORIDE 0.9 % (FLUSH) 0.9 %
10 SYRINGE (ML) INJECTION
Status: DISCONTINUED | OUTPATIENT
Start: 2024-03-13 | End: 2024-03-13 | Stop reason: HOSPADM

## 2024-03-13 RX ORDER — TETRACAINE HYDROCHLORIDE 5 MG/ML
1 SOLUTION OPHTHALMIC
Status: DISCONTINUED | OUTPATIENT
Start: 2024-03-13 | End: 2024-03-13 | Stop reason: HOSPADM

## 2024-03-13 RX ADMIN — PREDNISOLONE ACETATE 1 DROP: 10 SUSPENSION/ DROPS OPHTHALMIC at 10:03

## 2024-03-13 RX ADMIN — SODIUM CHLORIDE: 0.9 INJECTION, SOLUTION INTRAVENOUS at 11:03

## 2024-03-13 RX ADMIN — MOXIFLOXACIN OPHTHALMIC 1 DROP: 5 SOLUTION/ DROPS OPHTHALMIC at 10:03

## 2024-03-13 RX ADMIN — PHENYLEPHRINE HYDROCHLORIDE 1 DROP: 25 SOLUTION/ DROPS OPHTHALMIC at 10:03

## 2024-03-13 RX ADMIN — LIDOCAINE HYDROCHLORIDE 50 MG: 20 INJECTION INTRAVENOUS at 11:03

## 2024-03-13 RX ADMIN — Medication: at 10:03

## 2024-03-13 RX ADMIN — ATROPINE SULFATE 1 DROP: 10 SOLUTION/ DROPS OPHTHALMIC at 10:03

## 2024-03-13 RX ADMIN — PROPOFOL 50 MG: 10 INJECTION, EMULSION INTRAVENOUS at 11:03

## 2024-03-13 RX ADMIN — FENTANYL CITRATE 25 MCG: 50 INJECTION, SOLUTION INTRAMUSCULAR; INTRAVENOUS at 11:03

## 2024-03-13 RX ADMIN — TETRACAINE HYDROCHLORIDE 1 DROP: 5 SOLUTION OPHTHALMIC at 10:03

## 2024-03-13 RX ADMIN — MIDAZOLAM HYDROCHLORIDE 2 MG: 1 INJECTION, SOLUTION INTRAMUSCULAR; INTRAVENOUS at 11:03

## 2024-03-13 NOTE — TRANSFER OF CARE
Anesthesia Transfer of Care Note    Patient: Wai Bain    Procedure(s) Performed: Procedure(s) (LRB):  VITRECTOMY, PARS PLANA APPROACH (Right)    Patient location: PACU    Anesthesia Type: MAC    Transport from OR: Transported from OR on room air with adequate spontaneous ventilation    Post pain: adequate analgesia    Post assessment: no apparent anesthetic complications and tolerated procedure well    Post vital signs: stable    Level of consciousness: awake, alert and oriented    Nausea/Vomiting: no nausea/vomiting    Complications: none    Transfer of care protocol was followedComments: Bedside report to PACU RN, opportunity for questions given.       Last vitals: Visit Vitals  BP (!) 180/77   Pulse 65   Temp 36.6 °C (97.9 °F) (Temporal)   Resp 18   Wt 103.4 kg (228 lb)   SpO2 100%   BMI 35.70 kg/m²

## 2024-03-13 NOTE — OP NOTE
Preoperative diagnosis: [facility] OD    Post op Dx: same    Procedure performed:  25g PPV/EL/AFx/Avastin 1.25mg/0.05mL OD    Attending surgeons:  ROBERT Marino    Anesthesia:  Local MAC with retrobulbar injection 4.0cc mixture of 2% lidocaine, 0.75% marcaine    Estimated blood loss: minimal    Complications:  None    DOS: 3/13/24    Disposition: stable to recovery then home    Indications for surgery:   This is a 55yo patient who noted non clearing vitreous hemorrhage OD for last 4-5 months.  Decision was made to take the patient to surgery to remove the vitreous hemorrhage, prevent further vision loss and hopefully improved some vision.  Risks, benefits, and alternatives to surgery were discussed in detail. Risks including loss of vision, loss of eye, retina detachment, hemorrhage, infection, lens dislocation, glaucoma, hypotony, ptosis, diplopia    Description of procedure:  After proper informed consent was obtained, the patient was brought back to the operating room at [facility] where monitored anesthesia care was induced.  A retrobulbar injection was provided above without complication.  The patient is prepped draped in normal sterile fashion for ophthalmic surgery and a lid speculum was placed in the right eye.  A standard 3 port 25 gauge pars plana vitrectomy setup with the infusion cannula inserted 4.0 mm posterior to the limbus.  The infusion cannula was turned on after it was observed free and clear of all underlying tissue.  Super nasal and superotemporal trocars were also placed  4.0 mm posterior to the limbus.  The vitrector and light pipe were introduced in the vitreous cavity and a core vitrectomy was performed.   A 360 degree cortical vitrectomy was performed.    Endolaser was applied in a PRP fashion.   Avastin was injected throught the ST trocar. The trocars removed and not leaking after gentle massage.  The eye was normal pressure via palpation.  Subconjunctival injections of  vancomycin and Decadron were given and  the patient's the drapes removed. the patient was washed free of Betadine prep solution,  ointment was placed in the eye then patched shielded, mac anesthesia was reversed and he was brought to recovery in stable condition tolerating the procedure well.  Dr. Marino was present for in entire case

## 2024-03-13 NOTE — ANESTHESIA PREPROCEDURE EVALUATION
[facility]  Anesthesia Pre-Operative Evaluation       Patient Name: Wai Bain  YOB: 1970  MRN: [id number]  [facility]: [id number]      Code Status: Prior   Date of Procedure: 3/13/2024  Anesthesia: Local MAC Procedure: Procedure(s) (LRB):  VITRECTOMY, PARS PLANA APPROACH (Right)  Pre-Operative Diagnosis: Vitreous hemorrhage of both eyes [H43.13]  Proceduralist: Surgeon(s) and Role:     * ROBERT Marino MD - Primary        SUBJECTIVE:   Wai Bain is a 54 y.o. male who  has a past medical history of Anemia, CKD (chronic kidney disease), stage V (10/21/2023), Diabetes mellitus, Diabetes mellitus, type 2, H/O esophagogastroduodenoscopy, Hemodialysis patient (10/01/2023), Hypertension, Obesity, and Urinary tract infection. No notes on file  He has ESRD secondary to diabetic nephropathy.  Patient is currently on hemodialysis started on ~11/2023. Patient is dialyzing on TTS schedule.  Patient reports that he is tolerating dialysis well.. He has a  right chest catheter (and a healing LUE AV fistula) for dialysis access.     Anticoagulants   Medication Route Frequency       he has a current medication list which includes the following long-term medication(s): amlodipine, calcium acetate(phosphat bind), carvedilol, hydralazine, and sodium bicarbonate.   ALLERGIES:   Review of patient's allergies indicates:  No Known Allergies  LDA:          Lines/Drains/Airways       Central Venous Catheter Line  Duration                  Hemodialysis Catheter 11/13/23 1217 right internal jugular 120 days              Peripheral Intravenous Line  Duration                  Peripheral IV - Single Lumen 03/13/24 1029 20 G Anterior;Right Hand <1 day                  MEDICATIONS:     Antibiotics (From admission, onward)      Start     Stop Route Frequency Ordered    03/13/24 1013  vancomycin (VANCOCIN) 500 mg injection        Note to Pharmacy: Created by cabinet override    03/13/24 9483   03/13/24  1013    03/13/24 1013  neomycin-polymyxin-dexamethasone (DEXACINE) 3.5 mg/g-10,000 unit/g-0.1 % ophthalmic ointment        Note to Pharmacy: Created by cabinet override    03/13/24 2229   03/13/24 1013    03/13/24 0921  moxifloxacin 0.5 % ophthalmic solution 1 drop         -- RIGHT EYE On Call Procedure 03/13/24 0921          VTE Risk Mitigation (From admission, onward)      None          Current Facility-Administered Medications   Medication Dose Route Frequency Provider Last Rate Last Admin    atropine 1% ophthalmic solution 1 drop  1 drop Right Eye On Call Procedure Dayron Martinez MD   1 drop at 03/13/24 1019    bevacizumab (Avastin) 25 mg/mL ophthalmic injection syringe             BUPivacaine (PF) 0.75% (7.5 mg/ml) (MARCAINE) 0.75 % (7.5 mg/mL) injection             chondroitin-sodium hyaluronate (VISCOAT) 4-3 % (40-30 mg/mL) intra-ocular injection             dexAMETHasone (DECADRON) 4 mg/mL injection             EPINEPHrine (PF) (ADRENALIN) 1 mg/mL (1 mL) injection             indocyanine green (IC-GREEN) 25 mg injection             LIDOcaine (PF) 20 mg/mL (2%) 20 mg/mL (2 %) injection             moxifloxacin 0.5 % ophthalmic solution 1 drop  1 drop Right Eye On Call Procedure Dayron Martinez MD   1 drop at 03/13/24 1019    neomycin-polymyxin-dexamethasone (DEXACINE) 3.5 mg/g-10,000 unit/g-0.1 % ophthalmic ointment             phenylephrine HCL 2.5% ophthalmic solution 1 drop  1 drop Right Eye On Call Procedure Dayron Martinez MD   1 drop at 03/13/24 1028    prednisoLONE acetate 1 % ophthalmic suspension 1 drop  1 drop Right Eye On Call Procedure Dayron Martinez MD   1 drop at 03/13/24 1019    sodium chloride 0.9% flush 10 mL  10 mL Intravenous PRN Dayron Martinez MD        TETRAcaine HCl (PF) 0.5 % Drop 1 drop  1 drop Right Eye On Call Procedure Dayron Martinez MD   1 drop at 03/13/24 1009    vancomycin (VANCOCIN) 500 mg injection                   History:     Active Hospital Problems     Diagnosis  POA    Vitreous hemorrhage, bilateral [H43.13]  Yes    Type 2 diabetes mellitus with both eyes affected by proliferative retinopathy and macular edema, without long-term current use of insulin [E11.3513]  Yes      Resolved Hospital Problems   No resolved problems to display.     Surgical History:    has a past surgical history that includes Esophagogastroduodenoscopy (N/A, 03/13/2020); Laparoscopic cholecystectomy (N/A, 06/22/2020); Insertion of tunneled central venous hemodialysis catheter (Right, 11/13/2023); and Cholecystectomy.   Social History:    reports being sexually active and has had partner(s) who are female.  reports that he has quit smoking. His smoking use included cigarettes. He has never used smokeless tobacco. He reports current alcohol use of about 1.0 standard drink of alcohol per week. He reports that he does not use drugs.     OBJECTIVE:     Vital Signs (Most Recent):  Temp: 36.8 °C (98.2 °F) (03/13/24 1022)  Pulse: 67 (03/13/24 1022)  Resp: 18 (03/13/24 1022)  BP: (!) 178/77 (03/13/24 1022)  SpO2: 97 % (03/13/24 1022) Vital Signs Range (Last 24H):  Temp:  [36.8 °C (98.2 °F)]   Pulse:  [67]   Resp:  [18]   BP: (178)/(77)   SpO2:  [97 %]        Body mass index is 35.7 kg/m².   Wt Readings from Last 4 Encounters:   03/13/24 103.4 kg (228 lb)   01/10/24 101.6 kg (223 lb 15.8 oz)   12/28/23 113.4 kg (250 lb)   12/19/23 113.4 kg (250 lb)     Significant Labs:  Lab Results   Component Value Date    WBC 8.87 01/10/2024    HGB 10.1 (L) 01/10/2024    HCT 31.6 (L) 01/10/2024     01/10/2024     01/10/2024    K 3.3 (L) 01/10/2024     01/10/2024    CREATININE 5.7 (H) 01/10/2024    BUN 22 (H) 01/10/2024    CO2 28 01/10/2024     (H) 01/10/2024    CALCIUM 8.6 (L) 01/10/2024    MG 2.0 11/15/2023    PHOS 3.3 01/10/2024    ALKPHOS 89 01/10/2024    ALT 15 01/10/2024    AST 18 01/10/2024    ALBUMIN 3.3 (L) 01/10/2024    INR 1.1 01/10/2024    APTT 29.6 01/10/2024    HGBA1C 5.0  "01/10/2024    CPK 34 12/22/2019    TROPONINI 0.082 (H) 11/09/2023     (H) 11/09/2023     No LMP for male patient.  No results found for this or any previous visit (from the past 72 hour(s)).    EKG:   Results for orders placed or performed during the hospital encounter of 11/09/23   EKG 12-lead    Collection Time: 11/10/23  7:34 PM    Narrative    Test Reason :     Vent. Rate : 067 BPM     Atrial Rate : 067 BPM     P-R Int : 216 ms          QRS Dur : 112 ms      QT Int : 456 ms       P-R-T Axes : 007 -06 001 degrees     QTc Int : 481 ms    Sinus rhythm with 1st degree A-V block  Probable  Septal infarct (cited on or before vs due to IVCD  Abnormal ECG  When compared with ECG of 09-NOV-2023 17:37,  T wave inversion now evident in Inferior leads  Confirmed by Dejan TORRES MD ([id number]) on 11/11/2023 10:13:46 AM    Referred By: [email or web address]   SELF           Confirmed By:Dejan TORRES MD       TTE:  Results for orders placed or performed during the hospital encounter of 11/09/23   Echo   Result Value Ref Range    BSA 2.32 m2    LVOT stroke volume 179.35 cm3    LVIDd 5.63 3.5 - 6.0 cm    LV Systolic Volume 51.70 mL    LV Systolic Volume Index 23.3 mL/m2    LVIDs 3.52 2.1 - 4.0 cm    LV Diastolic Volume 155.49 mL    LV Diastolic Volume Index 70.04 mL/m2    IVS 0.93 0.6 - 1.1 cm    LVOT diameter 2.70 cm    LVOT area 5.7 cm2    FS 37 28 - 44 %    Left Ventricle Relative Wall Thickness 0.40 cm    Posterior Wall 1.13 (A) 0.6 - 1.1 cm    LV mass 230.48 g    LV Mass Index 104 g/m2    MV Peak E Luca 1.46 m/s    TDI LATERAL 0.11 m/s    TDI SEPTAL 0.09 m/s    E/E' ratio 14.60 m/s    MV Peak A Luca 1.11 m/s    TR Max Luca 3.11 m/s    E/A ratio 1.32     IVRT 74.22 msec    E wave deceleration time 189.30 msec    MV "A" wave duration 12.56 msec    LV SEPTAL E/E' RATIO 16.22 m/s    LA Volume Index 65.3 mL/m2    LV LATERAL E/E' RATIO 13.27 m/s    LA volume 145.01 cm3    PV Peak S Luca 0.47 m/s    PV Peak D Luca 0.57 m/s    Pulm vein S/D ratio " "0.82     LVOT peak ezekiel 1.15 m/s    RVDD 4.64 cm    TAPSE 2.36 cm    LA size 4.87 cm    Left Atrium Minor Axis 6.44 cm    Left Atrium Major Axis 6.71 cm    LA volume (mod) 117.65 cm3    LA WIDTH 5.33 cm    LA Volume Index (Mod) 53.0 mL/m2    RA Major Axis 5.64 cm    RA Width 4.19 cm    AV mean gradient 11 mmHg    AV peak gradient 20 mmHg    Ao peak ezekiel 2.23 m/s    Ao VTI 54.80 cm    LVOT peak VTI 31.34 cm    AV valve area 3.27 cm²    AV Velocity Ratio 0.52     AV index (prosthetic) 0.57     ANA LAURA by Velocity Ratio 2.95 cm²    Mr max ezekiel 0.06 m/s    MV stenosis pressure 1/2 time 54.90 ms    MV valve area p 1/2 method 4.01 cm2    Triscuspid Valve Regurgitation Peak Gradient 39 mmHg    Sinus 3.17 cm    STJ 2.76 cm    Ascending aorta 3.15 cm    Mean e' 0.10 m/s    ZLVIDS -2.29     ZLVIDD -3.20     TV resting pulmonary artery pressure 47 mmHg    RV TB RVSP 11 mmHg    Est. RA pres 8 mmHg    Narrative      Left Ventricle: The left ventricle is normal in size. Normal wall   thickness. Normal wall motion. There is normal systolic function with a   visually estimated ejection fraction of 60 - 65%. Grade II diastolic   dysfunction.    Right Ventricle: Mild right ventricular enlargement. Wall thickness is   normal. Right ventricle wall motion  is normal. Systolic function is   normal.    Biatrial enlargement    Mitral Valve: There is mild regurgitation.    Tricuspid Valve: There is mild regurgitation.    Pulmonary Artery: The estimated pulmonary artery systolic pressure is   47 mmHg.    IVC/SVC: Intermediate venous pressure at 8 mmHg.         LHC:  Results for orders placed during the hospital encounter of 11/09/23    Cardiac catheterization    Narrative  Procedure performed in the Invasive Lab  - See Procedure Log link below for nursing documentation  - See OpNote on Surgeries Tab for physician findings  - See Imaging Tab for radiologist dictation     PFT:  No results found for: "FEV1", "FVC", "AEP8QPJ", "TLC", "DLCO" "   ASSESSMENT/PLAN:       Pre-op Assessment          Review of Systems  Cardiovascular:     Hypertension                                  Hypertension         Renal/:  Chronic Renal Disease        Kidney Function/Disease             Endocrine:  Diabetes    Diabetes                          Physical Exam  General: Well nourished    Airway:  Mallampati: III / II  Mouth Opening: Normal  TM Distance: Normal  Tongue: Normal  Neck ROM: Normal ROM    Dental:  Intact        Anesthesia Plan  Type of Anesthesia, risks & benefits discussed:    Anesthesia Type: Gen ETT, Gen Natural Airway, MAC  Intra-op Monitoring Plan: Standard ASA Monitors  Post Op Pain Control Plan: multimodal analgesia and IV/PO Opioids PRN  Induction:  IV  Airway Plan: Direct and Video, Post-Induction  Informed Consent: Informed consent signed with the Patient and all parties understand the risks and agree with anesthesia plan.  All questions answered.   ASA Score: 3  Day of Surgery Review of History & Physical: H&P completed by Anesthesiologist.    Ready For Surgery From Anesthesia Perspective.     .

## 2024-03-13 NOTE — BRIEF OP NOTE
Preoperative diagnosis: [facility]CVH OD    Post op Dx: same    Procedure performed:  25g PPV/EL/AFx/Avastin 1.25mg/0.05mL OD    Attending Surgeon: Jamila    Assistant Surgeon: Michelle    Anesthesia: Local/Mac, retrobulbar injection of 4.0cc mixture 0.75%Marcaine, 2% Xylocaine    Estimated blood loss: Minimal    Complication: None    Specimen: None    Disposition: Stable to recovery    Findings/Outcome: NCVH OD    Date of Discharge: 3/13/24    Discharge Disposition: stable to recovery then home    F/U: tomorrow

## 2024-03-14 ENCOUNTER — OFFICE VISIT (OUTPATIENT)
Dept: OPHTHALMOLOGY | Facility: CLINIC | Age: 54
End: 2024-03-14
Payer: MEDICAID

## 2024-03-14 DIAGNOSIS — E11.3513 TYPE 2 DIABETES MELLITUS WITH BOTH EYES AFFECTED BY PROLIFERATIVE RETINOPATHY AND MACULAR EDEMA, WITHOUT LONG-TERM CURRENT USE OF INSULIN: Primary | ICD-10-CM

## 2024-03-14 PROCEDURE — 1159F MED LIST DOCD IN RCRD: CPT | Mod: CPTII,TXP,, | Performed by: OPHTHALMOLOGY

## 2024-03-14 PROCEDURE — 99212 OFFICE O/P EST SF 10 MIN: CPT | Mod: PBBFAC,TXP | Performed by: OPHTHALMOLOGY

## 2024-03-14 PROCEDURE — 99999 PR PBB SHADOW E&M-EST. PATIENT-LVL II: CPT | Mod: PBBFAC,TXP,, | Performed by: OPHTHALMOLOGY

## 2024-03-14 PROCEDURE — 99024 POSTOP FOLLOW-UP VISIT: CPT | Mod: TXP,,, | Performed by: OPHTHALMOLOGY

## 2024-03-14 PROCEDURE — 3044F HG A1C LEVEL LT 7.0%: CPT | Mod: CPTII,TXP,, | Performed by: OPHTHALMOLOGY

## 2024-03-17 NOTE — ANESTHESIA POSTPROCEDURE EVALUATION
Anesthesia Post Evaluation    Patient: Wai Bain    Procedure(s) Performed: Procedure(s) (LRB):  VITRECTOMY, PARS PLANA APPROACH (Right)    Final Anesthesia Type: MAC      Patient location during evaluation: PACU  Patient participation: Yes- Able to Participate  Level of consciousness: awake and alert  Post-procedure vital signs: reviewed and stable  Pain management: adequate  Airway patency: patent    PONV status at discharge: No PONV  Anesthetic complications: no      Cardiovascular status: blood pressure returned to baseline  Respiratory status: unassisted  Hydration status: euvolemic  Follow-up not needed.              Vitals Value Taken Time   /77 03/13/24 1300   Temp 36.6 °C (97.9 °F) 03/13/24 1300   Pulse 64 03/13/24 1300   Resp 18 03/13/24 1300   SpO2 99 % 03/13/24 1300         No case tracking events are documented in the log.      Pain/Flower Score: No data recorded

## 2024-03-19 ENCOUNTER — OFFICE VISIT (OUTPATIENT)
Dept: OPHTHALMOLOGY | Facility: CLINIC | Age: 54
End: 2024-03-19
Payer: MEDICAID

## 2024-03-19 DIAGNOSIS — H35.033 HYPERTENSIVE RETINOPATHY, BILATERAL: ICD-10-CM

## 2024-03-19 DIAGNOSIS — E11.3513 TYPE 2 DIABETES MELLITUS WITH BOTH EYES AFFECTED BY PROLIFERATIVE RETINOPATHY AND MACULAR EDEMA, WITHOUT LONG-TERM CURRENT USE OF INSULIN: Primary | ICD-10-CM

## 2024-03-19 DIAGNOSIS — H43.13 VITREOUS HEMORRHAGE, BILATERAL: ICD-10-CM

## 2024-03-19 PROCEDURE — 99999 PR PBB SHADOW E&M-EST. PATIENT-LVL III: CPT | Mod: PBBFAC,,, | Performed by: OPHTHALMOLOGY

## 2024-03-19 PROCEDURE — 92134 CPTRZ OPH DX IMG PST SGM RTA: CPT | Mod: PBBFAC | Performed by: OPHTHALMOLOGY

## 2024-03-19 PROCEDURE — 1160F RVW MEDS BY RX/DR IN RCRD: CPT | Mod: CPTII,,, | Performed by: OPHTHALMOLOGY

## 2024-03-19 PROCEDURE — 1159F MED LIST DOCD IN RCRD: CPT | Mod: CPTII,,, | Performed by: OPHTHALMOLOGY

## 2024-03-19 PROCEDURE — 3044F HG A1C LEVEL LT 7.0%: CPT | Mod: CPTII,,, | Performed by: OPHTHALMOLOGY

## 2024-03-19 PROCEDURE — 99024 POSTOP FOLLOW-UP VISIT: CPT | Mod: ,,, | Performed by: OPHTHALMOLOGY

## 2024-03-19 PROCEDURE — 99213 OFFICE O/P EST LOW 20 MIN: CPT | Mod: PBBFAC,25 | Performed by: OPHTHALMOLOGY

## 2024-03-19 NOTE — PROGRESS NOTES
HPI     1 WEEK   POST OP    OD      Additional comments: POST OP  1 WEEK   RRV/  25g EL/PARTIAL AFX    S/P LASER PRP     GTTS    ROSANA  QHS POLY/SHAKIR /DEX  OD  MOXI OD  QID   PF OD   QID   DME     VH    DM LAST BS   110 AVER  LAST A1C    7.5           Comments    1day post op. Little pain last night took a pill now ok.   PRIOR INJECTION S            HPI    1day post op. Little pain last night took a pill now ok.   Last edited by Michelle Cooney on 3/14/2024  8:09 AM.        HPI    DLS: 2/1/24    No eyedrops    Pt here for laser OD and injection OD.  Pt states VA OD has improved. Pt   denies flashes or floaters.    Last edited by Kasey Ross MA on 3/7/2024  3:00 PM.            Prior OCT - Complex DME OU  OD>OS    Prior WFFA - NV OU with late macular leakage OU        A/P    PDR OU  T2 uncontrolled without insulin  10/23 not seen x 8 months  Worsening VH OU  S/p PRP OU      S/p 25g PPV/EL/partial AFx/Avastin OD for NCVH OD 3/13/24    Doing well, good IOP    Taper PF 2/1/0  Atropine Q day    2. Complex DME OU  S/p Avastin OU x 7 ;ast 3/7/24      3. HTN Ret OU  BS/BP/chol control    4. Early NS OU        1 month  OCT

## 2024-03-20 NOTE — PROGRESS NOTES
HPI     1 month  prp  os laser  today      Additional comments: Laser os  prp   DM   LAST BS     unsure  LAST  A1C    unsure   LAST MACULAR LEAKAGE NV   WFFA   BLURRED VA IN OS X LAST VISIT            Comments    Patient here today for 1 mo f/u Avastin ou. Patient states OS improved,   but OD is still blurry and has noticeable blood, no pain and flashes OD.    AT ou prn  DLS  1/11/2024           Prior OCT - Complex DME OU  OD>OS    Prior WFFA - NV OU with late macular leakage OU        A/P    PDR OU  T2 uncontrolled without insulin  10/23 not seen x 8 months  Worsening VH OU    Will need PRP OU possible PPV OU if NI in heme    Plan PRP OS  Then PPV OD to follow    Plan 25g PPV/EL/partial AFx/Avastin OD for NCVH OD    Local MAC  LOC 45 min    Risks, benefits, and alternatives to treatment discussed in detail with the patient.  The patient voiced understanding and wished to proceed with the procedure        2. Complex DME OU  S/p Avastin OU x 6      3. HTN Ret OU  BS/BP/chol control    4. Early NS OU      2-3 weeks complete PRP OS      Risks, benefits, and alternatives to treatment discussed in detail with the patient.  The patient voiced understanding and wished to proceed with the procedure    Laser Procedure Note  Dx: PDR OS  Laser: PRP OS  Argon  Spot: 200  Power: 150-250  Dur: 0.15  #:  [id number]  Complications: None  F/U as above

## 2024-04-01 ENCOUNTER — TELEPHONE (OUTPATIENT)
Dept: CARDIOLOGY | Facility: HOSPITAL | Age: 54
End: 2024-04-01
Payer: MEDICARE

## 2024-04-01 ENCOUNTER — TELEPHONE (OUTPATIENT)
Dept: CARDIOLOGY | Facility: CLINIC | Age: 54
End: 2024-04-01
Payer: MEDICARE

## 2024-04-01 DIAGNOSIS — R00.2 PALPITATIONS: Primary | ICD-10-CM

## 2024-04-05 ENCOUNTER — TELEPHONE (OUTPATIENT)
Dept: OPHTHALMOLOGY | Facility: CLINIC | Age: 54
End: 2024-04-05
Payer: MEDICARE

## 2024-05-09 ENCOUNTER — OFFICE VISIT (OUTPATIENT)
Dept: OPHTHALMOLOGY | Facility: CLINIC | Age: 54
End: 2024-05-09
Payer: MEDICARE

## 2024-05-09 DIAGNOSIS — E11.3513 TYPE 2 DIABETES MELLITUS WITH BOTH EYES AFFECTED BY PROLIFERATIVE RETINOPATHY AND MACULAR EDEMA, WITHOUT LONG-TERM CURRENT USE OF INSULIN: Primary | ICD-10-CM

## 2024-05-09 DIAGNOSIS — H35.033 HYPERTENSIVE RETINOPATHY, BILATERAL: ICD-10-CM

## 2024-05-09 DIAGNOSIS — H43.13 VITREOUS HEMORRHAGE, BILATERAL: ICD-10-CM

## 2024-05-09 PROCEDURE — 99999 PR PBB SHADOW E&M-EST. PATIENT-LVL III: CPT | Mod: PBBFAC,TXP,, | Performed by: OPHTHALMOLOGY

## 2024-05-09 PROCEDURE — 92134 CPTRZ OPH DX IMG PST SGM RTA: CPT | Mod: PBBFAC,NTX | Performed by: OPHTHALMOLOGY

## 2024-05-09 PROCEDURE — 99999PBSHW PR PBB SHADOW TECHNICAL ONLY FILED TO HB: Mod: JZ,PBBFAC,TXP,

## 2024-05-09 PROCEDURE — 67028 INJECTION EYE DRUG: CPT | Mod: 50,PBBFAC,TXP | Performed by: OPHTHALMOLOGY

## 2024-05-09 PROCEDURE — 92014 COMPRE OPH EXAM EST PT 1/>: CPT | Mod: 24,25,S$PBB,TXP | Performed by: OPHTHALMOLOGY

## 2024-05-09 PROCEDURE — 67028 INJECTION EYE DRUG: CPT | Mod: 79,50,S$PBB,TXP | Performed by: OPHTHALMOLOGY

## 2024-05-09 PROCEDURE — 99213 OFFICE O/P EST LOW 20 MIN: CPT | Mod: PBBFAC,25,NTX | Performed by: OPHTHALMOLOGY

## 2024-05-09 RX ADMIN — Medication 1.25 MG: at 10:05

## 2024-05-09 NOTE — PROGRESS NOTES
HPI     Post-op Evaluation     Additional comments: 1 mo s/p ppv OD           Comments    Patient states vision still blurry OD, but improved, no pain and denies   floaters or flashes.          Last edited by Lynne Peralta on 5/9/2024  9:16 AM.          Prior OCT - Complex DME OU  OD>OS    Prior WFFA - NV OU with late macular leakage OU        A/P    PDR OU  T2 uncontrolled without insulin  10/23 not seen x 8 months  Worsening VH OU  S/p PRP OU      S/p 25g PPV/EL/partial AFx/Avastin OD for NCVH OD 3/13/24    Doing well, good IOP    2. Complex DME OU  S/p Avastin OU x 7 last 3/7/24    Avastin OU today    Get approval for Ozurdex - may need OD        3. HTN Ret OU  BS/BP/chol control    4. Early NS OU        1 month  OCT no dilate    Risks, benefits, and alternatives to treatment discussed in detail with the patient.  The patient voiced understanding and wished to proceed with the procedure    Injection Procedure Note:  Diagnosis: DME OU    Patient Identified and Time Out complete  Pt marked  Topical Proparacaine and Betadine.  Inject Avastin OU at 6:00 @ 3.5-4mm posterior to limbus  Post Operative Dx: Same  Complications: None  Follow up as above.

## 2024-05-14 ENCOUNTER — TELEPHONE (OUTPATIENT)
Dept: ENDOSCOPY | Facility: HOSPITAL | Age: 54
End: 2024-05-14
Payer: MEDICARE

## 2024-05-14 ENCOUNTER — TELEPHONE (OUTPATIENT)
Dept: TRANSPLANT | Facility: CLINIC | Age: 54
End: 2024-05-14
Payer: MEDICARE

## 2024-05-14 NOTE — TELEPHONE ENCOUNTER
Contacted patient to confirm Colonoscopy on 5/15/24. Pt's wife states that pt wants to postpone procedure right now and will call back to reschedule when ready. Endoscopy Scheduling Department given to pt to call when ready to reschedule.

## 2024-05-14 NOTE — TELEPHONE ENCOUNTER
Chart reviewed for selection: patient currently pending stress test, cards clearance and colonoscopy.patient canceled. Chart to MA to send 30 day letter.

## 2024-05-29 ENCOUNTER — OFFICE VISIT (OUTPATIENT)
Dept: INFECTIOUS DISEASES | Facility: CLINIC | Age: 54
End: 2024-05-29
Payer: MEDICARE

## 2024-05-29 DIAGNOSIS — Z22.7 LATENT TUBERCULOSIS: Primary | ICD-10-CM

## 2024-05-29 PROCEDURE — 99212 OFFICE O/P EST SF 10 MIN: CPT | Mod: 95,TXP,, | Performed by: PHYSICIAN ASSISTANT

## 2024-05-29 NOTE — PROGRESS NOTES
The patient location is: [place]  The chief complaint leading to consultation is: latent tuberculosis    Visit type: audiovisual    Face to Face time with patient: 5  10 minutes of total time spent on the encounter, which includes face to face time and non-face to face time preparing to see the patient (eg, review of tests), Obtaining and/or reviewing separately obtained history, Documenting clinical information in the electronic or other health record, Independently interpreting results (not separately reported) and communicating results to the patient/family/caregiver, or Care coordination (not separately reported).         Each patient to whom he or she provides medical services by telemedicine is:  (1) informed of the relationship between the physician and patient and the respective role of any other health care provider with respect to management of the patient; and (2) notified that he or she may decline to receive medical services by telemedicine and may withdraw from such care at any time.    Notes:        HPI:   Wai Bain is a 53 year old male with history of ESRD on HD seen by ID in February for evaluation of positive Quantiferon gold. Quantiferon was positive on 1/10/24. No evidence of active tuberculosis at that time. Patient was started on Rifampin 600 mg daily for four months. He is tolerating Rifampin well and reports compliance. Denies N/V, diarrhea, abdominal pain, skin yellowing. Denies blood pressure issues. He will finish his course of treatment in April. He still has not completed his scheduled blood work.       ASSESSMENT:  Latent tuberculosis infection (LTBI).  No evidence of active TB.     RECOMMENDATIONS:  Continue Rifampin 600 mg every day for 4 months as planned  CMP ordered again for patient to complete as soon as he can  The patient was asked to let me know if there are any problems tolerating the medication.  RTC as needed

## 2024-06-27 NOTE — PROGRESS NOTES
HPI    1day post op. Little pain last night took a pill now ok.   Last edited by Michelle Cooney on 3/14/2024  8:09 AM.        HPI    DLS: 2/1/24    No eyedrops    Pt here for laser OD and injection OD.  Pt states VA OD has improved. Pt   denies flashes or floaters.    Last edited by Kasey Ross MA on 3/7/2024  3:00 PM.            Prior OCT - Complex DME OU  OD>OS    Prior WFFA - NV OU with late macular leakage OU        A/P    PDR OU  T2 uncontrolled without insulin  10/23 not seen x 8 months  Worsening VH OU  S/p PRP OU      S/p 25g PPV/EL/partial AFx/Avastin OD for [facility] OD 3/13/24    Doing well, good IOP    Start gtts QID  Ointment/shield    2. Complex DME OU  S/p Avastin OU x 7 ;ast 3/7/24      3. HTN Ret OU  BS/BP/chol control    4. Early NS OU        1 week  
Treatment Goal Explanation (Does Not Render In The Note): Stable for the purposes of categorizing medical decision making is defined by the specific treatment goals for an individual patient. A patient that is not at their treatment goal is not stable, even if the condition has not changed and there is no short- term threat to life or function.

## 2024-07-18 ENCOUNTER — TELEPHONE (OUTPATIENT)
Dept: TRANSPLANT | Facility: CLINIC | Age: 54
End: 2024-07-18
Payer: MEDICARE

## 2024-12-06 ENCOUNTER — OFFICE VISIT (OUTPATIENT)
Dept: FAMILY MEDICINE | Facility: CLINIC | Age: 54
End: 2024-12-06
Payer: MEDICARE

## 2024-12-06 VITALS
OXYGEN SATURATION: 98 % | BODY MASS INDEX: 35.98 KG/M2 | WEIGHT: 229.25 LBS | SYSTOLIC BLOOD PRESSURE: 160 MMHG | DIASTOLIC BLOOD PRESSURE: 86 MMHG | HEART RATE: 83 BPM | HEIGHT: 67 IN

## 2024-12-06 DIAGNOSIS — E11.3513 TYPE 2 DIABETES MELLITUS WITH BOTH EYES AFFECTED BY PROLIFERATIVE RETINOPATHY AND MACULAR EDEMA, WITHOUT LONG-TERM CURRENT USE OF INSULIN: ICD-10-CM

## 2024-12-06 DIAGNOSIS — N18.5 CKD (CHRONIC KIDNEY DISEASE), STAGE V: ICD-10-CM

## 2024-12-06 DIAGNOSIS — N18.5 ANEMIA DUE TO STAGE 5 CHRONIC KIDNEY DISEASE, NOT ON CHRONIC DIALYSIS: ICD-10-CM

## 2024-12-06 DIAGNOSIS — E21.3 HYPERPARATHYROIDISM: Primary | ICD-10-CM

## 2024-12-06 DIAGNOSIS — E66.812 CLASS 2 SEVERE OBESITY DUE TO EXCESS CALORIES WITH SERIOUS COMORBIDITY AND BODY MASS INDEX (BMI) OF 35.0 TO 35.9 IN ADULT: ICD-10-CM

## 2024-12-06 DIAGNOSIS — I10 ESSENTIAL HYPERTENSION: ICD-10-CM

## 2024-12-06 DIAGNOSIS — D63.1 ANEMIA DUE TO STAGE 5 CHRONIC KIDNEY DISEASE, NOT ON CHRONIC DIALYSIS: ICD-10-CM

## 2024-12-06 DIAGNOSIS — E66.01 CLASS 2 SEVERE OBESITY DUE TO EXCESS CALORIES WITH SERIOUS COMORBIDITY AND BODY MASS INDEX (BMI) OF 35.0 TO 35.9 IN ADULT: ICD-10-CM

## 2024-12-06 DIAGNOSIS — N18.6 TYPE 2 DM WITH HYPERTENSION AND ESRD ON DIALYSIS: ICD-10-CM

## 2024-12-06 DIAGNOSIS — Z12.11 SCREENING FOR COLON CANCER: ICD-10-CM

## 2024-12-06 DIAGNOSIS — I12.0 TYPE 2 DM WITH HYPERTENSION AND ESRD ON DIALYSIS: ICD-10-CM

## 2024-12-06 DIAGNOSIS — E11.22 TYPE 2 DM WITH HYPERTENSION AND ESRD ON DIALYSIS: ICD-10-CM

## 2024-12-06 DIAGNOSIS — Z99.2 TYPE 2 DM WITH HYPERTENSION AND ESRD ON DIALYSIS: ICD-10-CM

## 2024-12-06 PROCEDURE — 99214 OFFICE O/P EST MOD 30 MIN: CPT | Mod: PBBFAC,PN | Performed by: STUDENT IN AN ORGANIZED HEALTH CARE EDUCATION/TRAINING PROGRAM

## 2024-12-06 PROCEDURE — 99999 PR PBB SHADOW E&M-EST. PATIENT-LVL IV: CPT | Mod: PBBFAC,,, | Performed by: STUDENT IN AN ORGANIZED HEALTH CARE EDUCATION/TRAINING PROGRAM

## 2024-12-06 RX ORDER — SEMAGLUTIDE 0.68 MG/ML
0.5 INJECTION, SOLUTION SUBCUTANEOUS
Qty: 3 ML | Refills: 0 | Status: SHIPPED | OUTPATIENT
Start: 2024-12-06 | End: 2025-01-05

## 2024-12-06 NOTE — ASSESSMENT & PLAN NOTE
Will initiate treatment with 0.25 mg weekly of ozempic  Follow up in a month  Continue follow up with nutritionist  Up to date with eye exams

## 2024-12-06 NOTE — ASSESSMENT & PLAN NOTE
Patient hasn't been taking his medications as prescribed  We agreed that he would measure his bp and if normal/normal-high, he will take his meds  His concern is that during dialysis days he gets hypotensive - will discuss with nephrologist

## 2024-12-06 NOTE — PROGRESS NOTES
Patient ID: aWi Bain is a 54 y.o. male.    Chief Complaint: Blood Sugar Problem and Establish Care    History of Present Illness    CHIEF COMPLAINT:  Wai presents today for follow-up of kidney disease and medication management.    KIDNEY DISEASE:  He undergoes hemodialysis 3 times per week on Tuesday, Thursday, and Saturday. He has a fistula in his left arm for dialysis access and has been on dialysis for approximately one year. The cause of his kidney disease is attributed to both diabetes and hypertension. He is under the care of nephrologist Dr. Demarco.    HYPERTENSION:  He is currently on amlodipine 10 mg, carvedilol 25 mg twice daily, and hydralazine 150 mg twice daily. He reports low blood pressure after dialysis sessions, sometimes leading to skipped medication doses. He has been advised to measure blood pressure before taking medications, particularly on dialysis days. On non-dialysis days, he is instructed to take blood pressure medications as prescribed. On dialysis days, he is advised to measure blood pressure after dialysis and take medication if blood pressure is above 120/80 mmHg.    DIABETES:  He reports a recent A1C level of 7.5%, indicating a recurrence of diabetes after a period of remission. The test was performed last week.    PREVENTIVE CARE:  He reports regular visits to an eye doctor and is open to colon cancer screening via an at-home kit. He experienced a shingles outbreak within the past year, which was painful. He has already received the pneumonia vaccination at his dialysis center. Tetanus vaccination status is unclear.    SOCIAL HISTORY:  He reports rare alcohol consumption and denies smoking.      ROS:  General: -fever, -chills, -fatigue, -weight gain, -weight loss  Eyes: -vision changes, -redness, -discharge  ENT: -ear pain, -nasal congestion, -sore throat  Cardiovascular: -chest pain, -palpitations, -lower extremity edema  Respiratory: -cough, -shortness of  breath  Gastrointestinal: -abdominal pain, -nausea, -vomiting, -diarrhea, -constipation, -blood in stool  Genitourinary: -dysuria, -hematuria, -frequency  Musculoskeletal: -joint pain, -muscle pain  Skin: +rash, -lesion  Neurological: -headache, -dizziness, -numbness, -tingling, -weakness  Psychiatric: -anxiety, -depression, -sleep difficulty         Physical Exam    General: No acute distress. Well-developed. Well-nourished.  Eyes: EOMI. Sclerae anicteric.  HENT: Normocephalic. Atraumatic. Nares patent. Moist oral mucosa.  Ears: Bilateral TMs clear. Bilateral EACs clear.  Cardiovascular: Regular rate. Regular rhythm. No murmurs. No rubs. No gallops. Normal S1, S2. Normal heart sounds.  Respiratory: Normal respiratory effort. Clear to auscultation bilaterally. No rales. No rhonchi. No wheezing. Clear lungs.  Abdomen: Soft. Non-tender. Non-distended. Normoactive bowel sounds.  Musculoskeletal: No  obvious deformity.  Extremities: No lower extremity edema.  Neurological: Alert & oriented x3. No slurred speech. Normal gait.  Psychiatric: Normal mood. Normal affect. Good insight. Good judgment.  Skin: Warm. Dry. No rash.         Assessment & Plan    END STAGE RENAL DISEASE AND RENAL DIALYSIS:  - Assessed blood pressure management, noting fluctuations related to dialysis schedule.  - Educated on the importance of blood pressure control for kidney health.  - Wai to check blood pressure regularly, especially on non-dialysis days.  - Recommend following renal and diabetic diet as recommended by dietitian.  - Take blood pressure medications on non-dialysis days.  - On dialysis days, measure blood pressure after dialysis: if > 120/80, take blood pressure medications; if < 100 systolic, hold blood pressure medications.    HYPERTENSION:  - Continued amlodipine 10 mg, carvedilol 25 mg twice daily, and hydralazine 150 mg twice daily.    TYPE 2 DIABETES MELLITUS:  - Evaluated diabetes control with recent A1C of 7.5.  - Discussed  the impact of diabetes on kidney function.  - Explained potential side effects of Ozempic, including nausea and GI issues.  - Started Ozempic 0.25 mg weekly injection, to be increased monthly.  - Follow up in 3 months for A1C check.    CARDIOVASCULAR HEALTH:  - Considered statin therapy for cardiovascular protection, pending lipid panel results.  - Informed about the need for cholesterol management in diabetes and end-stage renal disease.  - Lipid panel (fasting) ordered.    PREVENTIVE CARE:  - Reviewed vaccination status, including shingles vaccine recommendation post-infection.  - Assessed for potential colon cancer screening.  - At-home colorectal cancer screening kit ordered.    GENERAL HEALTH RECOMMENDATIONS:  - Wai to engage in regular exercise.    FOLLOW-UP:  - Follow up in 1 month to assess Ozempic tolerance and adjust dosage.       1. Hyperparathyroidism  Assessment & Plan:  Secondary iso CKD/ESRD  Continue follow up with nephrology      2. Type 2 diabetes mellitus with both eyes affected by proliferative retinopathy and macular edema, without long-term current use of insulin  Assessment & Plan:  Will initiate treatment with 0.25 mg weekly of ozempic  Follow up in a month  Continue follow up with nutritionist  Up to date with eye exams    Orders:  -     Cancel: Hemoglobin A1c; Future; Expected date: 12/06/2024  -     semaglutide (OZEMPIC) 0.25 mg or 0.5 mg (2 mg/3 mL) pen injector; Inject 0.5 mg into the skin every 7 days.  Dispense: 3 mL; Refill: 0  -     Hemoglobin A1c; Future; Expected date: 03/06/2025  -     Lipid Panel; Future; Expected date: 12/06/2024    3. Screening for colon cancer  -     Cologuard Screening (Multitarget Stool DNA); Future; Expected date: 12/06/2024    4. Type 2 DM with hypertension and ESRD on dialysis    5. Class 2 severe obesity due to excess calories with serious comorbidity and body mass index (BMI) of 35.0 to 35.9 in adult    6. Anemia due to stage 5 chronic kidney disease,  not on chronic dialysis  Assessment & Plan:  Stable, followed by nephrology      7. CKD (chronic kidney disease), stage V  Assessment & Plan:  Followed by nephrology  Renally dose meds        8. Essential hypertension  Assessment & Plan:  Patient hasn't been taking his medications as prescribed  We agreed that he would measure his bp and if normal/normal-high, he will take his meds  His concern is that during dialysis days he gets hypotensive - will discuss with nephrologist                No follow-ups on file.    This note was generated with the assistance of ambient listening technology. Verbal consent was obtained by the patient and accompanying visitor(s) for the recording of patient appointment to facilitate this note. I attest to having reviewed and edited the generated note for accuracy, though some syntax or spelling errors may persist. Please contact the author of this note for any clarification.

## 2025-01-06 ENCOUNTER — OFFICE VISIT (OUTPATIENT)
Dept: FAMILY MEDICINE | Facility: CLINIC | Age: 55
End: 2025-01-06
Payer: MEDICARE

## 2025-01-06 VITALS
HEIGHT: 67 IN | OXYGEN SATURATION: 98 % | BODY MASS INDEX: 36.04 KG/M2 | WEIGHT: 229.63 LBS | SYSTOLIC BLOOD PRESSURE: 134 MMHG | DIASTOLIC BLOOD PRESSURE: 73 MMHG | HEART RATE: 76 BPM

## 2025-01-06 DIAGNOSIS — I10 ESSENTIAL HYPERTENSION: ICD-10-CM

## 2025-01-06 DIAGNOSIS — B35.1 ONYCHOMYCOSIS: ICD-10-CM

## 2025-01-06 DIAGNOSIS — E11.3513 TYPE 2 DIABETES MELLITUS WITH BOTH EYES AFFECTED BY PROLIFERATIVE RETINOPATHY AND MACULAR EDEMA, WITHOUT LONG-TERM CURRENT USE OF INSULIN: Primary | ICD-10-CM

## 2025-01-06 PROCEDURE — 99999 PR PBB SHADOW E&M-EST. PATIENT-LVL IV: CPT | Mod: PBBFAC,,, | Performed by: STUDENT IN AN ORGANIZED HEALTH CARE EDUCATION/TRAINING PROGRAM

## 2025-01-06 PROCEDURE — 99214 OFFICE O/P EST MOD 30 MIN: CPT | Mod: S$PBB,,, | Performed by: STUDENT IN AN ORGANIZED HEALTH CARE EDUCATION/TRAINING PROGRAM

## 2025-01-06 PROCEDURE — 99214 OFFICE O/P EST MOD 30 MIN: CPT | Mod: PBBFAC,PN | Performed by: STUDENT IN AN ORGANIZED HEALTH CARE EDUCATION/TRAINING PROGRAM

## 2025-01-06 RX ORDER — SEMAGLUTIDE 1.34 MG/ML
1 INJECTION, SOLUTION SUBCUTANEOUS
Qty: 3 ML | Refills: 3 | Status: SHIPPED | OUTPATIENT
Start: 2025-01-06 | End: 2025-04-28

## 2025-01-06 NOTE — ASSESSMENT & PLAN NOTE
Protective Sensation (w/ 10 gram monofilament):  Right: Decreased  Left: Decreased    Visual Inspection:  Nails Intact - with Evidence of Foot Deformity- Bilateral    Pedal Pulses:   Right: Present  Left: Present    Posterior Tibialis Pulses:   Right:Present  Left: Present

## 2025-01-06 NOTE — PROGRESS NOTES
Patient ID: Wai Bain is a 54 y.o. male.    Chief Complaint: Follow-up (1 month f/u )    History of Present Illness    CHIEF COMPLAINT:  Wai presents today for blood pressure management and dialysis follow-up.    BLOOD PRESSURE AND DIALYSIS:  He reports home blood pressure readings of 130-135 when compliant with medications, though acknowledges occasional medication non-compliance resulting in elevated readings. During dialysis sessions, he experiences occasional blood pressure decreases towards the end of treatments, though not significantly. He received influenza and pneumonia vaccinations at the dialysis center.    MEDICATIONS:  He reports feeling well with current medication regimen.    DIABETIC COMPLICATIONS:  He experiences changes in sensation in his feet due to diabetes and has a fungal nail infection.    PREVENTIVE CARE:  He received a [employer] kit for colon cancer screening but has not completed the test yet. He plans to complete the screening this week.      ROS:  General: -fever, -chills, -fatigue, -weight gain, -weight loss  Eyes: -vision changes, -redness, -discharge  ENT: -ear pain, -nasal congestion, -sore throat  Cardiovascular: -chest pain, -palpitations, -lower extremity edema  Respiratory: -cough, -shortness of breath  Gastrointestinal: -abdominal pain, -nausea, -vomiting, -diarrhea, -constipation, -blood in stool  Genitourinary: -dysuria, -hematuria, -frequency  Musculoskeletal: -joint pain, -muscle pain  Skin: -rash, -lesion  Neurological: -headache, -dizziness, +numbness, -tingling  Psychiatric: -anxiety, -depression, -sleep difficulty         Physical Exam    General: No acute distress. Well-developed. Well-nourished.  Eyes: EOMI. Sclerae anicteric.  HENT: Normocephalic. Atraumatic. Nares patent. Moist oral mucosa.  Ears: Bilateral TMs clear. Bilateral EACs clear.  Cardiovascular: Regular rate. Regular rhythm. No murmurs. No rubs. No gallops. Normal S1, S2.  Respiratory: Normal  respiratory effort. Clear to auscultation bilaterally. No rales. No rhonchi. No wheezing.  Abdomen: Soft. Non-tender. Non-distended. Normoactive bowel sounds.  Musculoskeletal: No  obvious deformity.  Extremities: No lower extremity edema.  Neurological: Alert & oriented x3. No slurred speech. Normal gait.  Psychiatric: Normal mood. Normal affect. Good insight. Good judgment.  Skin: Warm. Dry. No rash. Fungal infection on nails.  Diabetic Foot: Decreased sensation in feet.         Assessment & Plan    2 DIABETES MELLITUS WITH DIABETIC CHRONIC KIDNEY DISEASE:  - Assessed dialysis-related blood pressure fluctuations and evaluated elevated office blood pressure reading of 150/82.  - Emphasized importance of consistent blood pressure medication adherence for cardiovascular protection, especially given patient's dialysis status.  - Continued current antihypertensive regimen including amlodipine, carvedilol, and hydralazine.  - Ordered labs to monitor kidney function and other relevant parameters.  - Instructed patient to monitor blood pressure at home regularly.  - Noted regular vaccinations received at dialysis center.  - Scheduled follow-up visit in 3 months.    CLASS 2:  - Reviewed effectiveness of current Ozempic (semaglutide) dose for weight management and increased dose to optimize weight loss.  - Educated patient about the positive impact of weight loss on blood pressure control.    2 DIABETES MELLITUS WITH BOTH EYES AFFECTED BY PROLIFERATIVE RETINOPATHY AND MACULAR EDEMA, WITHOUT LONG-TERM CURRENT USE OF INSULIN:  - Performed sensory test on patient's feet to assess for diabetic neuropathy.  - Referred patient to podiatry for comprehensive diabetic foot care.    HYPERTENSION:  - Discussed the relationship between salt intake and blood pressure control, recommending reduced salt intake to help manage blood pressure.  - Instructed patient to contact the office if home blood pressure readings exceed 140.    FOOT  CARE:  - Evaluated diabetic foot health during exam, noting fungal infection in toenails.  - Explained the risk of infection from untreated fungal toenail infection in diabetic patients.  - Referred patient to podiatry for management, which may include oral medication and nail cleaning.    CANCER SCREENING:  - Inquired about the status of the [employer] test for colon cancer screening, noting it has been provided but not yet completed.  - Emphasized the importance of completing the test for early detection and intervention of potential issues.  - Instructed patient to complete the ColoGuard test within the week.       1. Type 2 diabetes mellitus with both eyes affected by proliferative retinopathy and macular edema, without long-term current use of insulin  Assessment & Plan:  Protective Sensation (w/ 10 gram monofilament):  Right: Decreased  Left: Decreased    Visual Inspection:  Nails Intact - with Evidence of Foot Deformity- Bilateral    Pedal Pulses:   Right: Present  Left: Present    Posterior Tibialis Pulses:   Right:Present  Left: Present     Orders:  -     Hemoglobin A1C; Future; Expected date: 01/06/2025  -     Ambulatory referral/consult to Podiatry; Future; Expected date: 01/13/2025    2. Onychomycosis    3. Essential hypertension    Other orders  -     semaglutide (OZEMPIC) 1 mg/dose (4 mg/3 mL); Inject 1 mg into the skin every 7 days.  Dispense: 3 mL; Refill: 3          Declined vaccines. Education provided.  Encouraged to complete cologuard.    No follow-ups on file.    This note was generated with the assistance of ambient listening technology. Verbal consent was obtained by the patient and accompanying visitor(s) for the recording of patient appointment to facilitate this note. I attest to having reviewed and edited the generated note for accuracy, though some syntax or spelling errors may persist. Please contact the author of this note for any clarification.

## 2025-01-07 ENCOUNTER — PATIENT MESSAGE (OUTPATIENT)
Dept: FAMILY MEDICINE | Facility: CLINIC | Age: 55
End: 2025-01-07
Payer: MEDICARE

## 2025-03-03 ENCOUNTER — OFFICE VISIT (OUTPATIENT)
Dept: PODIATRY | Facility: CLINIC | Age: 55
End: 2025-03-03
Payer: MEDICARE

## 2025-03-03 VITALS — WEIGHT: 223 LBS | BODY MASS INDEX: 34.92 KG/M2

## 2025-03-03 DIAGNOSIS — Z78.9 NON-ENGLISH SPEAKING PATIENT: ICD-10-CM

## 2025-03-03 DIAGNOSIS — E11.3513 TYPE 2 DIABETES MELLITUS WITH BOTH EYES AFFECTED BY PROLIFERATIVE RETINOPATHY AND MACULAR EDEMA, WITHOUT LONG-TERM CURRENT USE OF INSULIN: ICD-10-CM

## 2025-03-03 DIAGNOSIS — E66.812 CLASS 2 SEVERE OBESITY DUE TO EXCESS CALORIES WITH SERIOUS COMORBIDITY AND BODY MASS INDEX (BMI) OF 35.0 TO 35.9 IN ADULT: ICD-10-CM

## 2025-03-03 DIAGNOSIS — E66.01 CLASS 2 SEVERE OBESITY DUE TO EXCESS CALORIES WITH SERIOUS COMORBIDITY AND BODY MASS INDEX (BMI) OF 35.0 TO 35.9 IN ADULT: ICD-10-CM

## 2025-03-03 DIAGNOSIS — E11.22 TYPE 2 DM WITH HYPERTENSION AND ESRD ON DIALYSIS: Primary | ICD-10-CM

## 2025-03-03 DIAGNOSIS — I12.0 TYPE 2 DM WITH HYPERTENSION AND ESRD ON DIALYSIS: Primary | ICD-10-CM

## 2025-03-03 DIAGNOSIS — N18.6 TYPE 2 DM WITH HYPERTENSION AND ESRD ON DIALYSIS: Primary | ICD-10-CM

## 2025-03-03 DIAGNOSIS — B35.1 ONYCHOMYCOSIS: ICD-10-CM

## 2025-03-03 DIAGNOSIS — Z99.2 TYPE 2 DM WITH HYPERTENSION AND ESRD ON DIALYSIS: Primary | ICD-10-CM

## 2025-03-03 PROCEDURE — 99999 PR PBB SHADOW E&M-EST. PATIENT-LVL III: CPT | Mod: PBBFAC,,, | Performed by: PODIATRIST

## 2025-03-03 PROCEDURE — 99213 OFFICE O/P EST LOW 20 MIN: CPT | Mod: PBBFAC,PN | Performed by: PODIATRIST

## 2025-03-03 PROCEDURE — 99203 OFFICE O/P NEW LOW 30 MIN: CPT | Mod: S$PBB,,, | Performed by: PODIATRIST

## 2025-03-03 RX ORDER — CICLOPIROX 80 MG/ML
SOLUTION TOPICAL NIGHTLY
Qty: 6.6 ML | Refills: 6 | Status: SHIPPED | OUTPATIENT
Start: 2025-03-03

## 2025-03-03 NOTE — PROGRESS NOTES
"[facility] - PODIATRY  [address]  [address]  [place] [address]  Dept: [phone]  Dept Fax: [phone]    Travis Farmer Jr., DPM     Assessment:   MDM    Coding  1. 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 Type 2 diabetes mellitus with both eyes affected by proliferative retinopathy and macular edema, without long-term current use of insulin  Ambulatory referral/consult to Podiatry    Foot Exam Performed    Ambulatory referral/consult to Diabetes Education      3. Onychomycosis  ciclopirox (PENLAC) 8 % Soln      4. Class 2 severe obesity due to excess calories with serious comorbidity and body mass index (BMI) of 35.0 to 35.9 in adult        5. Non-English speaking patient            Plan:     Procedures  1. 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 Type 2 diabetes mellitus with both eyes affected by proliferative retinopathy and macular edema, without long-term current use of insulin  -     Ambulatory referral/consult to Podiatry  -     Foot Exam Performed  -     Ambulatory referral/consult to Diabetes Education; Future; Expected date: 03/10/2025    3. Onychomycosis  -     ciclopirox (PENLAC) 8 % Soln; Apply topically nightly.  Dispense: 6.6 mL; Refill: 6    4. Class 2 severe obesity due to excess calories with serious comorbidity and body mass index (BMI) of 35.0 to 35.9 in adult    5. Non-English speaking patient      Wai CINTRON "Wai" was seen today for nail problem.    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    Class 2 severe obesity due to excess calories with serious comorbidity and body mass index (BMI) of 35.0 to 35.9 in adult    Non-English speaking patient      ADA Risk Classification: No LOPS,No PAD, No deformity - 0: rtc 12 months    -pt seen, evaluated, and managed  -dx discussed in detail. All questions/concerns addressed  -all tx options discussed. All alternatives, risks, benefits of all txs discussed  -comprehensive diabetic foot exam with risk assessment performed today  -the patient was educated about the diagnosis and discussed reducing caloric intake, increase physical activity  -labs reviewed by me: A1c of 6.7. recs as below  -Shoe inspection. Diabetic Foot Education. Patient reminded of the importance of good nutrition and blood sugar control to help prevent podiatric complications of diabetes. Patient instructed on proper foot hygeine. We discussed wearing proper shoe gear, daily foot inspections, never walking without protective shoe gear, never putting sharp instruments to feet.  -rxs dispensed: penlac  -referrals: dm education  -WB: wbat        Follow up if symptoms worsen or fail to improve.    Subjective:      Patient ID: Wai Bain is a 54 y.o. male.    Chief Complaint:   Chief Complaint   Patient presents with    Nail Problem       Wai Bain presents to the clinic upon referral from Dr. Pete  for evaluation and treatment of diabetic feet. Patient relates no major problem with feet. Only complaints today consist of needing dm foot exam.      HPI    Last Podiatry Enc: Visit date not found  Last Enc w/ Me: Visit date not found    Outside reports reviewed: historical medical records.  Family hx: as  below  Past Medical History:   Diagnosis Date    Anemia     CKD (chronic kidney disease), stage V 10/21/2023    Diabetes mellitus     Diabetes mellitus, type 2     H/O esophagogastroduodenoscopy     Hemodialysis patient 10/01/2023    Hypertension     Obesity     Urinary tract infection     History     Past Surgical History:   Procedure Laterality Date    CHOLECYSTECTOMY      ESOPHAGOGASTRODUODENOSCOPY N/A 03/13/2020    Procedure: EGD (ESOPHAGOGASTRODUODENOSCOPY);  Surgeon: Katiuska العراقي MD;  Location: [facility] ENDO;  Service: Endoscopy;  Laterality: N/A;    INSERTION OF TUNNELED CENTRAL VENOUS HEMODIALYSIS CATHETER Right 11/13/2023    Procedure: Insertion, Catheter, Central Venous, Hemodialysis;  Surgeon: Sasha Galloway MD;  Location: [facility] CATH LAB;  Service: Interventional Nephrology;  Laterality: Right;    LAPAROSCOPIC CHOLECYSTECTOMY N/A 06/22/2020    Procedure: CHOLECYSTECTOMY, LAPAROSCOPIC;  Surgeon: Dayron Conner MD;  Location: [facility] OR;  Service: General;  Laterality: N/A;    VITRECTOMY BY PARS PLANA APPROACH Right 3/13/2024    Procedure: VITRECTOMY, PARS PLANA APPROACH;  Surgeon: ROBERT Marino MD;  Location: [address];  Service: Ophthalmology;  Laterality: Right;  45 min     Family History   Problem Relation Name Age of Onset    Diabetes Mother      Pancreatitis Father      Hypertension Sister      Other (hyperglycemia) Sister      No Known Problems Sister      No Known Problems Sister      No Known Problems Sister      No Known Problems Sister      Diabetes Brother      Kidney disease Brother      Hypertension Brother      Diabetes Brother      Diabetes Brother      Hypertension Brother      Diabetes Brother      Other (heart issues) Brother      Hypertension Brother      Prostate cancer Neg Hx       Current Medications[1]  Review of patient's allergies indicates:  No Known Allergies  Social History[2]    ROS    REVIEW OF SYSTEMS: Negative as documented  below as well as positive findings in bold.       Constitutional  Respiratory  Gastrointestinal  Skin   - Fever - Cough - Heartburn - Rash   - Chills - Spit blood - Nausea - Itching   - Weight Loss - Shortness of breath - Vomiting - Nail pain   - Malaise/Fatigue - Wheezing - Abdominal Pain  Wound/Ulcer   - Weight Gain   - Blood in Stool  Poor wound healing       - Diarrhea          Cardiovascular  Genitourinary  Neurological  HEENT   - Chest Pain - Dysuria - Burning Sensation of feet - Headache   - Palpitations - Hematuria - Tingling / Paresthesia - Congestion   - Pain at night in legs - Flank Pain - Dizziness - Sore Throat   - Cramping   - Tremor - Blurred Vision   - Leg Swelling   - Sensory Change - Double Vision   - Dizzy when standing   - Speech Change - Eye Redness       - Focal Weakness - Dry Eyes       - Loss of Consciousness          Endocrine  Musculoskeletal  Psychiatric   - Cold intolerance - Muscle Pain - Depression   - Heat intolerance - Neck Pain - Insomnia   - Anemia - Joint Pain - Memory Loss   -  Easy bruising, bleeding - Heel pain - Anxiety      Toe Pain        Leg/Ankle/Foot Pain         Objective:     Wt 101.1 kg (222 lb 15.9 oz)   BMI 34.92 kg/m²   Vitals:    03/03/25 1033   Weight: 101.1 kg (222 lb 15.9 oz)   PainSc: 0-No pain       Physical Exam    General Appearance:   Patient appears well developed, well nourished  Patient appears stated age    Psychiatric:   Patient is oriented to time, place, and person.  Patient has appropriate mood and affect    Neck:  Trachea Midline  No visible masses    Respiratory/Ears:  No distress or labored breathing.  Able to differentiate between normal talking voice and whisper.  Able to follow commands    Eyes:  Visual Acuity intact  Lids and conjunctivae normal. No discoloration noted.    Physical Exam  Vitals and nursing note reviewed.   Feet:      Right foot:      Protective Sensation: 10 sites tested.  10 sites sensed.      Toenail Condition: Fungal  disease present.     Left foot:      Protective Sensation: 10 sites tested.  10 sites sensed.      Toenail Condition: Fungal disease present.  Psychiatric:         Thought Content: Thought content normal.         Judgment: Judgment normal.     Ortho Exam  General    Nursing note and vitals reviewed.  Psychiatric: Judgment and thought content normal.           Foot Exam  Foot/Ankle Musculoskeletal Exam    B/l LE exam con't:  V:  DP 2/4, PT 2/4   CRT< 3s to all digits tested   Tibial and popliteal lymph nodes are w/o abnormality    hair growth present bilaterally, coloration normal, edema absent bilaterally, varicosities present bilaterally    N:  Patient displays normal ankle reflexes   SILT in SP/DP/T/Fernando/Saph distributions    Ortho: +Motor EHL/FHL/TA/GA   no TTP of foot or ankles   Compartments soft/compressible. No pain on passive stretch of big toe. No calf  Pain.     Derm:  skin intact, skin warm and dry, skin without ulcers or lesions, skin without induration, nails abnormal, no erythema and no ecchymosis    Imaging / Labs:    Hemoglobin A1C   Date Value Ref Range Status   01/06/2025 6.7 (H) 4.0 - 5.6 % Final     Comment:     ADA Screening Guidelines:  5.7-6.4%  Consistent with prediabetes  >or=6.5%  Consistent with diabetes    High levels of fetal hemoglobin interfere with the HbA1C  assay. Heterozygous hemoglobin variants (HbS, HgC, etc)do  not significantly interfere with this assay.   However, presence of multiple variants may affect accuracy.     01/10/2024 5.0 4.0 - 5.6 % Final     Comment:     ADA Screening Guidelines:  5.7-6.4%  Consistent with prediabetes  >or=6.5%  Consistent with diabetes    High levels of fetal hemoglobin interfere with the HbA1C  assay. Heterozygous hemoglobin variants (HbS, HgC, etc)do  not significantly interfere with this assay.   However, presence of multiple variants may affect accuracy.     10/21/2023 5.6 4.0 - 5.6 % Final     Comment:     ADA Screening Guidelines:  5.7-6.4%   Consistent with prediabetes  >or=6.5%  Consistent with diabetes    High levels of fetal hemoglobin interfere with the HbA1C  assay. Heterozygous hemoglobin variants (HbS, HgC, etc)do  not significantly interfere with this assay.   However, presence of multiple variants may affect accuracy.         No results found.      Note: This was dictated using a computer transcription program. Although proofread, it may contain computer transcription errors and phonetic errors. Other human proofreading errors may also exist. Corrections may be performed at a later time. Please contact us for any clarification if needed.    Travis Farmer DPM  [facility]           [1]  Current Outpatient Medications   Medication Sig Dispense Refill    hydrALAZINE (APRESOLINE) 50 MG tablet Take 3 tablets (150 mg total) by mouth 2 (two) times a day. 540 tablet 3    semaglutide (OZEMPIC) 1 mg/dose (4 mg/3 mL) Inject 1 mg into the skin every 7 days. 3 mL 3    sevelamer carbonate (RENVELA) 800 mg Tab Take 5 tablets (4,000 mg total) by mouth 3 (three) times daily with meals. 450 tablet 11    sodium zirconium cyclosilicate (LOKELMA) 10 gram packet Take 1 packet (10 g total) by mouth once daily. Mix entire contents of packet(s) into drinking glass containing 3 tablespoons of water; stir well and drink immediately. Add water and repeat until no powder remains to receive entire dose. 30 packet 0    tenapanor (XPHOZAH) 20 mg Tab Take 20 mg by mouth 2 (two) times daily. 60 tablet 11    amLODIPine (NORVASC) 10 MG tablet Take 1 tablet (10 mg total) by mouth once daily. 30 tablet 1    carvediloL (COREG) 25 MG tablet Take 1 tablet (25 mg total) by mouth 2 (two) times daily. 60 tablet 1    ciclopirox (PENLAC) 8 % Soln Apply topically nightly. 6.6 mL 6    sodium bicarbonate 650 MG tablet Take 2 tablets (1,300 mg total) by mouth 3 (three) times daily. 180 tablet 1     No current facility-administered medications for this  "visit.   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     Social Drivers of Health     Financial Resource Strain: Medium Risk (2/12/2024)    Overall Financial Resource Strain (CARDIA)     Difficulty of Paying Living Expenses: Somewhat hard   Food Insecurity: Food Insecurity Present (2/12/2024)    Hunger Vital Sign     Worried About Running Out of Food in the Last Year: Sometimes true     Ran Out of Food in the Last Year: Sometimes true   Transportation Needs: No Transportation Needs (2/12/2024)    PRAPARE - Transportation     Lack of Transportation (Medical): No     Lack of Transportation (Non-Medical): No   Physical Activity: Unknown (2/12/2024)    Exercise Vital Sign     Days of Exercise per Week: 3 days   Recent Concern: Physical Activity - Insufficiently Active (2/12/2024)    Exercise Vital Sign     Days of Exercise per Week: 3 days     Minutes of Exercise per Session: 30 min   Stress: No Stress Concern Present (2/12/2024)    [employer] - Occupational Stress Questionnaire     Feeling of Stress : Not at all   Housing Stability: Patient Declined (2/12/2024)    Housing Stability Vital Sign     Unable to Pay for Housing in the Last Year: Patient declined     Number of Places Lived in the Last Year: 1     Unstable Housing in the Last Year: Patient declined   "

## 2025-03-03 NOTE — PATIENT INSTRUCTIONS
Diabetes: Inspecting Your Feet      Diabetes increases your chances of developing foot problems. So inspect your feet every day. This helps you find small skin irritations before they become serious ulcers or infections. If you have trouble seeing the bottoms of your feet, use a mirror or ask a family member or friend to help.  How to check your feet  Below are tips to help you look for foot problems. Try to check your feet at the same time each day, such as when you get out of bed in the morning:  Check the top of each foot. The tops of toes, back of the heel, and outer edge of the foot can get a lot of rubbing from poor-fitting shoes.  Check the bottom of each foot. Daily wear and tear often leads to problems at pressure spots.  Check the toes and nails. Fungal infections often occur between toes. Toenail problems can also be a sign of fungal infections or lead to breaks in the skin.  Check your shoes, too. Loose objects inside a shoe can injure the foot. Use your hand to feel inside your shoes for things like mahnaz, loose stitching, or rough areas that could irritate your skin.  Warning signs  Look for any color changes in the foot. Redness with streaks can signal a severe infection, which needs immediate medical attention. Tell your healthcare provider right away if you have any of these problems:  Swelling, sometimes with color changes, may be a sign of poor blood flow or infection. Symptoms include tenderness and an increase in the size of your foot.  Warm or hot areas on your feet may be signs of infection. A foot that is cold may not be getting enough blood.  Sensations such as burning, tingling, or pins and needles can be signs of a problem. Also check for areas that may be numb.  Hot spots are caused by friction or pressure. Look for hot spots in areas that get a lot of rubbing. Hot spots can turn into blisters, calluses, or sores.  Cracks and sores are caused by dry or irritated skin. They are a sign  that the skin is breaking down, which can lead to infection.  Toenail problems to watch for include nails growing into the skin (ingrown toenail) and causing redness or pain. Thick, yellow, or discolored nails can signal a fungal infection.  Drainage and odor can develop from untreated sores and ulcers. Call your healthcare provider right away if you notice white or yellow drainage, bleeding, or unpleasant odor.   Date Last Reviewed: 6/1/2016 © 2000-2017 [employer]. [address] [place] [id number]. All rights reserved. This information is not intended as a substitute for professional medical care. Always follow your healthcare professional's instructions.    Long-Term Complications of Diabetes    Diabetes can cause health problems over time. These are called complications. They are more likely to happen if your blood sugar is often too high. Over time, high blood sugar can damage blood vessels in your body. It is important to keep your blood sugar in your target range. This can help prevent or delay complications from diabetes.  Possible complications  Complications of diabetes include:    Eye problems, including damage to the blood vessels in the eyes (retinopathy), pressure in the eye (glaucoma), and clouding of the eye's lens (a cataract). Eye problems can eventually lead to irreversible blindness.   Tooth and gum problems (periodontal disease), causing loss of teeth and bone  Blood vessel (vascular) disease leading to circulation problems, heart attack or stroke, or a need for amputation of a limb   Problems with sexual function leading to erectile dysfunction in men and sexual discomfort in women   Kidney disease (nephropathy) can eventually lead to kidney failure, which may require dialysis or kidney transplant   Nerve problems (neuropathy), causing pain or loss of feeling in your feet and other parts of your body, potentially leading to an amputation of a limb   High blood pressure  (hypertension), putting strain on your heart and blood vessels  Serious infections, possibly leading to loss of toes, feet, or limbs    How to avoid complications  The serious consequences of these complications may be avoidable for most people with diabetes by managing your blood glucose, blood pressure, and cholesterol levels. This can help you feel better and stay healthy. You can manage diabetes by tracking your blood sugar. You can also eat healthy and exercise to avoid gaining weight. And you should take medicine if directed by your healthcare provider.      Diabetes Foot Care Guidelines  Diabetic foot care is essential as diabetes can be dangerous to your feet--even a small cut can produce serious consequences. Diabetes may cause nerve damage that takes away the feeling in your feet. Diabetes may also reduce blood flow to the feet, making it harder to heal an injury or resist infection. Because of these problems, you may not notice a foreign object in your shoe. As a result, you could develop a blister or a sore. This could lead to an infection or a nonhealing wound that could put you at risk for an amputation.    To avoid serious foot problems that could result in losing a toe, foot or leg, follow these guidelines.    Inspect your feet daily. Check for cuts, blisters, redness, swelling or nail problems. Use a magnifying hand mirror to look at the bottom of your feet. Call your doctor if you notice anything.    Bathe feet in lukewarm, never hot, water. Keep your feet clean by washing them daily. Use only lukewarm water--the temperature you would use on a  baby.    Be gentle when bathing your feet. Wash them using a soft washcloth or sponge. Dry by blotting or patting and carefully dry between the toes.    Moisturize your feet but not between your toes. Use a moisturizer daily to keep dry skin from itching or cracking. But don't moisturize between the toes--that could encourage a fungal  infection.    Cut nails carefully. Cut them straight across and file the edges. Dont cut nails too short, as this could lead to ingrown toenails. If you have concerns about your nails, consult your doctor.    Never treat corns or calluses yourself. No bathroom surgery or medicated pads. Visit your doctor for appropriate treatment.    Wear clean, dry socks. Change them daily.    Consider socks made specifically for patients living with diabetes. These socks have extra cushioning, do not have elastic tops, are higher than the ankle and are made from fibers that wick moisture away from the skin.    Wear socks to bed. If your feet get cold at night, wear socks. Never use a heating pad or a hot water bottle.    Shake out your shoes and feel the inside before wearing. Remember, your feet may not be able to feel a pebble or other foreign object, so always inspect your shoes before putting them on.    Keep your feet warm and dry. Dont let your feet get wet in snow or rain. Wear warm socks and shoes in winter.    Consider using an antiperspirant on the soles of your feet. This is helpful if you have excessive sweating of the feet.    Never walk barefoot. Not even at home! Always wear shoes or slippers. You could step on something and get a scratch or cut.    Take care of your diabetes. Keep your blood sugar levels under control.    Do not smoke. Smoking restricts blood flow in your feet.    Get periodic foot exams. Seeing your foot and ankle surgeon on a regular basis can help prevent the foot complications of diabetes.

## 2025-04-07 ENCOUNTER — OFFICE VISIT (OUTPATIENT)
Dept: FAMILY MEDICINE | Facility: CLINIC | Age: 55
End: 2025-04-07
Payer: MEDICARE

## 2025-04-07 VITALS
WEIGHT: 223.44 LBS | DIASTOLIC BLOOD PRESSURE: 85 MMHG | HEIGHT: 67 IN | SYSTOLIC BLOOD PRESSURE: 130 MMHG | OXYGEN SATURATION: 98 % | HEART RATE: 86 BPM | BODY MASS INDEX: 35.07 KG/M2

## 2025-04-07 DIAGNOSIS — I10 ESSENTIAL HYPERTENSION: ICD-10-CM

## 2025-04-07 DIAGNOSIS — I10 PRIMARY HYPERTENSION: ICD-10-CM

## 2025-04-07 DIAGNOSIS — E11.3513 TYPE 2 DIABETES MELLITUS WITH BOTH EYES AFFECTED BY PROLIFERATIVE RETINOPATHY AND MACULAR EDEMA, WITHOUT LONG-TERM CURRENT USE OF INSULIN: Primary | ICD-10-CM

## 2025-04-07 PROCEDURE — 99214 OFFICE O/P EST MOD 30 MIN: CPT | Mod: S$PBB,,, | Performed by: STUDENT IN AN ORGANIZED HEALTH CARE EDUCATION/TRAINING PROGRAM

## 2025-04-07 PROCEDURE — 99213 OFFICE O/P EST LOW 20 MIN: CPT | Mod: PBBFAC,PN | Performed by: STUDENT IN AN ORGANIZED HEALTH CARE EDUCATION/TRAINING PROGRAM

## 2025-04-07 PROCEDURE — 99999 PR PBB SHADOW E&M-EST. PATIENT-LVL III: CPT | Mod: PBBFAC,,, | Performed by: STUDENT IN AN ORGANIZED HEALTH CARE EDUCATION/TRAINING PROGRAM

## 2025-04-07 PROCEDURE — G2211 COMPLEX E/M VISIT ADD ON: HCPCS | Mod: S$PBB,,, | Performed by: STUDENT IN AN ORGANIZED HEALTH CARE EDUCATION/TRAINING PROGRAM

## 2025-04-07 RX ORDER — CARVEDILOL 25 MG/1
25 TABLET ORAL 2 TIMES DAILY
Qty: 180 TABLET | Refills: 0 | Status: SHIPPED | OUTPATIENT
Start: 2025-04-07 | End: 2025-07-06

## 2025-04-07 RX ORDER — AMLODIPINE BESYLATE 10 MG/1
10 TABLET ORAL DAILY
Qty: 90 TABLET | Refills: 0 | Status: SHIPPED | OUTPATIENT
Start: 2025-04-07 | End: 2025-07-06

## 2025-04-07 RX ORDER — SEMAGLUTIDE 1.34 MG/ML
1 INJECTION, SOLUTION SUBCUTANEOUS
Qty: 9 ML | Refills: 0 | Status: SHIPPED | OUTPATIENT
Start: 2025-04-07 | End: 2025-07-06

## 2025-04-07 RX ORDER — HYDRALAZINE HYDROCHLORIDE 50 MG/1
150 TABLET, FILM COATED ORAL 2 TIMES DAILY
Qty: 540 TABLET | Refills: 0 | Status: SHIPPED | OUTPATIENT
Start: 2025-04-07 | End: 2025-07-06

## 2025-04-07 NOTE — PROGRESS NOTES
Patient ID: Wai Bain is a 55 y.o. male.    Chief Complaint: Follow-up (3 month f/u )    History of Present Illness    CHIEF COMPLAINT:  Wai presents today for follow up.    BLOOD PRESSURE AND DIALYSIS:  He reports home blood pressure readings ranging from 130/85 to 155/79. His blood pressure has been stable during dialysis sessions, both at start and end of treatment. He continues to produce urine while on dialysis.    DIABETES:  He denies episodes of hypoglycemia.    PREVENTIVE HEALTH:  He has not completed his at-home colon cancer screening kit and is overdue for an eye exam.      ROS:  General: -fever, -chills, -fatigue, -weight gain, -weight loss  Eyes: -vision changes, -redness, -discharge  ENT: -ear pain, -nasal congestion, -sore throat  Cardiovascular: -chest pain, -palpitations, -lower extremity edema  Respiratory: -cough, -shortness of breath  Gastrointestinal: -abdominal pain, -nausea, -vomiting, -diarrhea, -constipation, -blood in stool  Genitourinary: -dysuria, -hematuria, -frequency  Musculoskeletal: -joint pain, -muscle pain  Skin: -rash, -lesion  Neurological: -headache, -dizziness, -numbness, -tingling  Psychiatric: -anxiety, -depression, -sleep difficulty         Physical Exam    General: No acute distress. Well-developed. Well-nourished.  Eyes: EOMI. Sclerae anicteric.  HENT: Normocephalic. Atraumatic. Nares patent. Moist oral mucosa.  Ears: Bilateral TMs clear. Bilateral EACs clear.  Cardiovascular: Regular rate. Regular rhythm. No murmurs. No rubs. No gallops. Normal S1, S2.  Respiratory: Normal respiratory effort. Clear to auscultation bilaterally. No rales. No rhonchi. No wheezing.  Abdomen: Soft. Non-tender. Non-distended. Normoactive bowel sounds.  Musculoskeletal: No  obvious deformity.  Extremities: No lower extremity edema.  Neurological: Alert & oriented x3. No slurred speech. Normal gait.  Psychiatric: Normal mood. Normal affect. Good insight. Good judgment.  Skin: Warm. Dry.  No rash.         Assessment & Plan    END STAGE RENAL DISEASE AND DIALYSIS:  - Monitor the patient's condition during regular dialysis sessions.  - Evaluate the patient's stable condition and good lab work results.  - Assess the potential for reducing dialysis hours based on the patient's good progress.  - Discuss potential reduction in dialysis hours with nephrologist Dr. Rodriguez.  - Continue ongoing dialysis treatment under the care of nephrologist Dr. Rodriguez.  - Monitor the patient's condition during regular dialysis sessions.  - Evaluate blood pressure readings during dialysis, which are generally within acceptable range.  - Assess the potential for reducing dialysis hours based on good lab work results.  - Discuss potential reduction in dialysis hours with nephrologist Dr. Rodriguez.  - Continue ongoing dialysis treatment under the care of nephrologist Dr. Rodriguez.    TYPE 2 DIABETES MELLITUS:  - Monitor the patient's A1C level, which was 5.4 1 month ago, indicating well-controlled diabetes.  - Evaluate the patient's diabetes control as very good based on recent A1C result.  - Assess for any episodes of hypoglycemia, which the patient denies experiencing.  - Maintain current diabetes management plan as it is effective.    ESSENTIAL HYPERTENSION:  - Monitor blood pressure readings at home and during dialysis sessions, which range from 130/85 to 155/79.  - Evaluate home blood pressure readings as optimal within the reported range.  - Assess that elevated blood pressure during office visit might be due to white coat hypertension.  - Continue current antihypertensive medication regimen.  - Refill antihypertensive medication with a 90-day supply.       1. Type 2 diabetes mellitus with both eyes affected by proliferative retinopathy and macular edema, without long-term current use of insulin  -     CBC Auto Differential; Future; Expected date: 04/07/2025  -     Comprehensive Metabolic Panel; Future; Expected date:  04/07/2025  -     Microalbumin/Creatinine Ratio, Urine; Future; Expected date: 04/07/2025  -     Hemoglobin A1C; Future; Expected date: 04/07/2025  -     Diabetic Eye Screening Photo; Future  -     semaglutide (OZEMPIC) 1 mg/dose (4 mg/3 mL); Inject 1 mg into the skin every 7 days.  Dispense: 9 mL; Refill: 0    2. Primary hypertension  -     hydrALAZINE (APRESOLINE) 50 MG tablet; Take 3 tablets (150 mg total) by mouth 2 (two) times a day.  Dispense: 540 tablet; Refill: 0    3. Essential hypertension    Other orders  -     amLODIPine (NORVASC) 10 MG tablet; Take 1 tablet (10 mg total) by mouth once daily.  Dispense: 90 tablet; Refill: 0  -     carvediloL (COREG) 25 MG tablet; Take 1 tablet (25 mg total) by mouth 2 (two) times daily.  Dispense: 180 tablet; Refill: 0          Encouraged to complete cologuard. Education provided about early detection. Patient agreeable.  Per patient, vaccines up-to-date in dialysis    No follow-ups on file.    This note was generated with the assistance of ambient listening technology. Verbal consent was obtained by the patient and accompanying visitor(s) for the recording of patient appointment to facilitate this note. I attest to having reviewed and edited the generated note for accuracy, though some syntax or spelling errors may persist. Please contact the author of this note for any clarification.

## 2025-05-09 ENCOUNTER — CLINICAL SUPPORT (OUTPATIENT)
Dept: OPHTHALMOLOGY | Facility: CLINIC | Age: 55
End: 2025-05-09
Payer: MEDICARE

## 2025-05-09 ENCOUNTER — OFFICE VISIT (OUTPATIENT)
Dept: OPHTHALMOLOGY | Facility: CLINIC | Age: 55
End: 2025-05-09
Payer: MEDICARE

## 2025-05-09 DIAGNOSIS — H35.033 HYPERTENSIVE RETINOPATHY, BILATERAL: ICD-10-CM

## 2025-05-09 DIAGNOSIS — H43.13 VITREOUS HEMORRHAGE, BILATERAL: ICD-10-CM

## 2025-05-09 DIAGNOSIS — E11.3513 TYPE 2 DIABETES MELLITUS WITH BOTH EYES AFFECTED BY PROLIFERATIVE RETINOPATHY AND MACULAR EDEMA, WITHOUT LONG-TERM CURRENT USE OF INSULIN: Primary | ICD-10-CM

## 2025-05-09 PROCEDURE — 99999 PR PBB SHADOW E&M-EST. PATIENT-LVL III: CPT | Mod: PBBFAC,,, | Performed by: OPHTHALMOLOGY

## 2025-05-09 PROCEDURE — 99213 OFFICE O/P EST LOW 20 MIN: CPT | Mod: PBBFAC | Performed by: OPHTHALMOLOGY

## 2025-05-09 NOTE — PROGRESS NOTES
HPI     dm   me   dfe            oct     Additional comments: Dm   Last bs      110  Last A1c     5.7    PDR  OCT     Blurred va     VH    Art    tears  ou  prn      DLS  05/09/24          Last edited by Tiffani Bain on 5/9/2025  8:10 AM.          OCT - OD poor view from VH  OS DME improved.  Minimal paracentral DME    Prior WFFA - NV OU with late macular leakage OU        A/P    PDR OU  T2 uncontrolled without insulin  10/23 not seen x 8 months  Worsening VH OU  S/p PRP OU  s/p 25g PPV/EL/partial AFx/Avastin OD for NCVH OD 3/13/24    5/25 - New VH OD    Avastin OD      2. Complex DME OU  S/p Avastin OU x 8 last 5/224      3. HTN Ret OU  BS/BP/chol control    4. Early NS OU    5. ESRD on HD  TRS        1 month  OCT and dilate OD    Risks, benefits, and alternatives to treatment discussed in detail with the patient.  The patient voiced understanding and wished to proceed with the procedure    Injection Procedure Note:  Diagnosis: PDR with VH OD    Patient Identified and Time Out complete  Pt marked  Topical Proparacaine and Betadine.  Inject Avastin OD at 6:00 @ 3.5-4mm posterior to limbus  Post Operative Dx: Same  Complications: None  Follow up as above.

## 2025-05-29 ENCOUNTER — PATIENT OUTREACH (OUTPATIENT)
Dept: ADMINISTRATIVE | Facility: HOSPITAL | Age: 55
End: 2025-05-29
Payer: MEDICARE

## 2025-05-29 NOTE — PROGRESS NOTES
Health Maintenance Topic(s) Outreach Outcomes & Actions Taken:    Colorectal Cancer Screening - Outreach Outcomes & Actions Taken  : Reminded Patient to Complete Already Received Home Test

## 2025-06-10 ENCOUNTER — PROCEDURE VISIT (OUTPATIENT)
Dept: OPHTHALMOLOGY | Facility: CLINIC | Age: 55
End: 2025-06-10
Payer: MEDICARE

## 2025-06-10 ENCOUNTER — CLINICAL SUPPORT (OUTPATIENT)
Dept: OPHTHALMOLOGY | Facility: CLINIC | Age: 55
End: 2025-06-10
Payer: MEDICARE

## 2025-06-10 DIAGNOSIS — E11.3513 TYPE 2 DIABETES MELLITUS WITH BOTH EYES AFFECTED BY PROLIFERATIVE RETINOPATHY AND MACULAR EDEMA, WITHOUT LONG-TERM CURRENT USE OF INSULIN: ICD-10-CM

## 2025-06-10 DIAGNOSIS — E11.3513 TYPE 2 DIABETES MELLITUS WITH BOTH EYES AFFECTED BY PROLIFERATIVE RETINOPATHY AND MACULAR EDEMA, WITHOUT LONG-TERM CURRENT USE OF INSULIN: Primary | ICD-10-CM

## 2025-06-10 PROCEDURE — 92134 CPTRZ OPH DX IMG PST SGM RTA: CPT | Mod: PBBFAC

## 2025-06-10 PROCEDURE — 92014 COMPRE OPH EXAM EST PT 1/>: CPT | Mod: 25,S$PBB,, | Performed by: OPHTHALMOLOGY

## 2025-06-10 PROCEDURE — 99999PBSHW PR PBB SHADOW TECHNICAL ONLY FILED TO HB: Mod: PBBFAC,,,

## 2025-06-10 PROCEDURE — 67028 INJECTION EYE DRUG: CPT | Mod: S$PBB,RT,, | Performed by: OPHTHALMOLOGY

## 2025-06-10 PROCEDURE — 92134 CPTRZ OPH DX IMG PST SGM RTA: CPT | Mod: 26,S$PBB,, | Performed by: OPHTHALMOLOGY

## 2025-06-10 PROCEDURE — 67028 INJECTION EYE DRUG: CPT | Mod: PBBFAC,RT | Performed by: OPHTHALMOLOGY

## 2025-06-10 RX ORDER — CALCIUM ACETATE 667 MG/1
CAPSULE ORAL
COMMUNITY
Start: 2024-12-21

## 2025-06-10 RX ORDER — MELOXICAM 15 MG/1
15 TABLET ORAL
COMMUNITY
Start: 2025-06-09

## 2025-06-10 RX ADMIN — Medication 1.25 MG: at 09:06

## 2025-06-10 NOTE — PROGRESS NOTES
Assessment /Plan     For exam results, see Encounter Report.    Type 2 diabetes mellitus with both eyes affected by proliferative retinopathy and macular edema, without long-term current use of insulin  -     Posterior Segment OCT Retina-Both eyes        Unable to obtain OCT OD due to poor view, OCT OS done-CC

## 2025-06-10 NOTE — PROGRESS NOTES
HPI     Follow-up     Additional comments: 1 mo Avastin OD           Comments    C/o blurry VA OD, no pain ou and denies floaters or flashes.          Last edited by Lynne Peralta on 6/10/2025  8:12 AM.          OCT - OD poor view from VH  OS DME improved.  Minimal paracentral DME    Prior WFFA - NV OU with late macular leakage OU        A/P    PDR OU  T2 uncontrolled without insulin  10/23 not seen x 8 months  Worsening VH OU  S/p PRP OU  s/p 25g PPV/EL/partial AFx/Avastin OD for NCVH OD 3/13/24  S/p Avastin OD in series  x 1    5/25 - New VH OD    Avastin OD again      2. Complex DME OU  S/p Avastin OU x 8  in past - last 5/24      3. HTN Ret OU  BS/BP/chol control    4. Early NS OU    5. ESRD on HD  TRS        1 month  OCT and dilate OD    Risks, benefits, and alternatives to treatment discussed in detail with the patient.  The patient voiced understanding and wished to proceed with the procedure    Injection Procedure Note:  Diagnosis: PDR with VH OD    Patient Identified and Time Out complete  Pt marked  Topical Proparacaine and Betadine.  Inject Avastin OD at 6:00 @ 3.5-4mm posterior to limbus  Post Operative Dx: Same  Complications: None  Follow up as above.

## 2025-06-23 DIAGNOSIS — Z00.00 ENCOUNTER FOR MEDICARE ANNUAL WELLNESS EXAM: ICD-10-CM

## 2025-07-07 DIAGNOSIS — I10 PRIMARY HYPERTENSION: ICD-10-CM

## 2025-07-07 RX ORDER — HYDRALAZINE HYDROCHLORIDE 50 MG/1
150 TABLET, FILM COATED ORAL 2 TIMES DAILY
Qty: 540 TABLET | Refills: 0 | Status: CANCELLED | OUTPATIENT
Start: 2025-07-07 | End: 2025-10-05

## 2025-07-07 RX ORDER — AMLODIPINE BESYLATE 10 MG/1
10 TABLET ORAL DAILY
Qty: 90 TABLET | Refills: 0 | Status: SHIPPED | OUTPATIENT
Start: 2025-07-07 | End: 2025-10-05

## 2025-07-25 ENCOUNTER — CLINICAL SUPPORT (OUTPATIENT)
Dept: OPHTHALMOLOGY | Facility: CLINIC | Age: 55
End: 2025-07-25
Payer: MEDICARE

## 2025-07-25 ENCOUNTER — PROCEDURE VISIT (OUTPATIENT)
Dept: OPHTHALMOLOGY | Facility: CLINIC | Age: 55
End: 2025-07-25
Payer: MEDICARE

## 2025-07-25 DIAGNOSIS — H35.033 HYPERTENSIVE RETINOPATHY, BILATERAL: ICD-10-CM

## 2025-07-25 DIAGNOSIS — E11.3513 TYPE 2 DIABETES MELLITUS WITH BOTH EYES AFFECTED BY PROLIFERATIVE RETINOPATHY AND MACULAR EDEMA, WITHOUT LONG-TERM CURRENT USE OF INSULIN: ICD-10-CM

## 2025-07-25 DIAGNOSIS — E11.3513 TYPE 2 DIABETES MELLITUS WITH BOTH EYES AFFECTED BY PROLIFERATIVE RETINOPATHY AND MACULAR EDEMA, WITHOUT LONG-TERM CURRENT USE OF INSULIN: Primary | ICD-10-CM

## 2025-07-25 DIAGNOSIS — H43.13 VITREOUS HEMORRHAGE, BILATERAL: ICD-10-CM

## 2025-07-25 RX ADMIN — Medication 1.25 MG: at 10:07

## 2025-07-25 NOTE — PROGRESS NOTES
HPI    Av OD/ Dfe OD/Oct     Pt reports vision has gotten better but still blurry.   States floaters were really bad a few days ago but have since gotten a lot   better   Did not check bs today.     -Flashes   +Floaters -OD   -pain   No gtts   Last edited by Savana Moreland on 7/25/2025  9:05 AM.          OCT - OD VH improving  OS DME improved.  Minimal paracentral DME    Prior WFFA - NV OU with late macular leakage OU        A/P    PDR OU  T2 uncontrolled without insulin  10/23 not seen x 8 months  Worsening VH OU  S/p PRP OU  s/p 25g PPV/EL/partial AFx/Avastin OD for NCVH OD 3/13/24  S/p Avastin OD in series  x 1    5/25 - New VH OD  improving    Avastin OD again      2. Complex DME OU  S/p Avastin OU x 8  in past - last 5/24      3. HTN Ret OU  BS/BP/chol control    4. NS OU  OD with some progression post PPV - check MRx next visit as VH improves    5. ESRD on HD  TRS        2 month  OCT/MRx and dilate OD    Risks, benefits, and alternatives to treatment discussed in detail with the patient.  The patient voiced understanding and wished to proceed with the procedure    Injection Procedure Note:  Diagnosis: PDR with VH OD    Patient Identified and Time Out complete  Pt marked  Topical Proparacaine and Betadine.  Inject Avastin OD at 6:00 @ 3.5-4mm posterior to limbus  Post Operative Dx: Same  Complications: None  Follow up as above.

## 2025-07-30 ENCOUNTER — OFFICE VISIT (OUTPATIENT)
Dept: FAMILY MEDICINE | Facility: CLINIC | Age: 55
End: 2025-07-30
Payer: MEDICARE

## 2025-07-30 VITALS
OXYGEN SATURATION: 98 % | SYSTOLIC BLOOD PRESSURE: 130 MMHG | WEIGHT: 215.06 LBS | BODY MASS INDEX: 33.75 KG/M2 | DIASTOLIC BLOOD PRESSURE: 80 MMHG | HEART RATE: 82 BPM | HEIGHT: 67 IN

## 2025-07-30 DIAGNOSIS — Z12.5 SCREENING FOR PROSTATE CANCER: ICD-10-CM

## 2025-07-30 DIAGNOSIS — E11.3513 TYPE 2 DIABETES MELLITUS WITH BOTH EYES AFFECTED BY PROLIFERATIVE RETINOPATHY AND MACULAR EDEMA, WITHOUT LONG-TERM CURRENT USE OF INSULIN: ICD-10-CM

## 2025-07-30 DIAGNOSIS — M70.22 OLECRANON BURSITIS OF LEFT ELBOW: ICD-10-CM

## 2025-07-30 DIAGNOSIS — I10 PRIMARY HYPERTENSION: Primary | ICD-10-CM

## 2025-07-30 PROCEDURE — 99214 OFFICE O/P EST MOD 30 MIN: CPT | Mod: PBBFAC,PN | Performed by: STUDENT IN AN ORGANIZED HEALTH CARE EDUCATION/TRAINING PROGRAM

## 2025-07-30 PROCEDURE — 99214 OFFICE O/P EST MOD 30 MIN: CPT | Mod: S$PBB,,, | Performed by: STUDENT IN AN ORGANIZED HEALTH CARE EDUCATION/TRAINING PROGRAM

## 2025-07-30 PROCEDURE — 99999 PR PBB SHADOW E&M-EST. PATIENT-LVL IV: CPT | Mod: PBBFAC,,, | Performed by: STUDENT IN AN ORGANIZED HEALTH CARE EDUCATION/TRAINING PROGRAM

## 2025-07-30 RX ORDER — CARVEDILOL 25 MG/1
25 TABLET ORAL 2 TIMES DAILY
Qty: 180 TABLET | Refills: 0 | Status: SHIPPED | OUTPATIENT
Start: 2025-07-30 | End: 2025-10-28

## 2025-07-30 RX ORDER — HYDRALAZINE HYDROCHLORIDE 50 MG/1
150 TABLET, FILM COATED ORAL 2 TIMES DAILY
Qty: 540 TABLET | Refills: 0 | Status: SHIPPED | OUTPATIENT
Start: 2025-07-30 | End: 2025-10-28

## 2025-07-30 RX ORDER — AMLODIPINE BESYLATE 10 MG/1
10 TABLET ORAL DAILY
Qty: 90 TABLET | Refills: 0 | Status: SHIPPED | OUTPATIENT
Start: 2025-07-30 | End: 2025-10-28

## 2025-07-30 RX ORDER — SEMAGLUTIDE 2.68 MG/ML
2 INJECTION, SOLUTION SUBCUTANEOUS
Qty: 3 ML | Refills: 11 | Status: SHIPPED | OUTPATIENT
Start: 2025-07-30 | End: 2026-07-30

## 2025-07-30 NOTE — PROGRESS NOTES
Patient ID: Wai Bain is a 55 y.o. male.    Chief Complaint: Medication Problem and elbow mass (For the past month.)    History of Present Illness    CHIEF COMPLAINT:  Wai presents today for elbow pain.    ELBOW PAIN:  He reports elbow pain present for over a month. Pain is exacerbated when leaning on the elbow, particularly during dialysis sessions where he spends approximately four hours with elbow resting on the armrest. He denies any associated redness, drainage, or fever. He has not attempted any treatment for the pain to date.    HYPERTENSION:  His blood pressure consistently runs around 130/80 or 125/80 during dialysis sessions. He notes that blood pressure sometimes drops after dialysis, which healthcare providers have indicated is normal. Most recent home blood pressure reading was 130/85. He takes amlodipine, hydralazine, and carvedilol for blood pressure management.    DIABETIC RETINOPATHY:  He receives monthly right eye injections by Dr. Valenzuela for ongoing management of diabetic retinopathy.    DIABETES:  He currently takes Ozempic 1 mg for diabetes management and reports no side effects.    PREVENTIVE CARE:  He has a colon cancer screening kit at home but has not completed it yet. He declines COVID and pneumonia vaccines, stating he would receive them at the dialysis center.      ROS:  General: -fever, -chills, -fatigue, -weight gain, -weight loss  Eyes: -vision changes, -redness, -discharge  ENT: -ear pain, -nasal congestion, -sore throat  Cardiovascular: -chest pain, -palpitations, -lower extremity edema  Respiratory: -cough, -shortness of breath  Gastrointestinal: -abdominal pain, -nausea, -vomiting, -diarrhea, -constipation, -blood in stool  Genitourinary: -dysuria, -hematuria, -frequency  Musculoskeletal: -joint pain, -muscle pain, +limb pain  Skin: -rash, -lesion  Neurological: -headache, -dizziness, -numbness, -tingling  Psychiatric: -anxiety, -depression, -sleep difficulty          Physical Exam    Vitals: Blood pressure: 130/85.  General: No acute distress. Well-developed. Well-nourished.  Eyes: EOMI. Sclerae anicteric.  HENT: Normocephalic. Atraumatic. Nares patent. Moist oral mucosa.  Ears: Bilateral TMs clear. Bilateral EACs clear.  Cardiovascular: Regular rate. Regular rhythm. No murmurs. No rubs. No gallops. Normal S1, S2.  Respiratory: Normal respiratory effort. Clear to auscultation bilaterally. No rales. No rhonchi. No wheezing.  Abdomen: Soft. Non-tender. Non-distended. Normoactive bowel sounds.  Musculoskeletal: No  obvious deformity.  Extremities: No lower extremity edema.  Neurological: Alert & oriented x3. No slurred speech. Normal gait.  Psychiatric: Normal mood. Normal affect. Good insight. Good judgment.  Skin: Warm. Dry. No rash.  MSK: Elbow - Right: Olecranon bursitis.         Assessment & Plan    ## OLECRANON BURSITIS, RIGHT ELBOW:  - Diagnosed olecranon bursitis in the right elbow, likely due to repetitive pressure during dialysis.  - Examination revealed pain, especially when leaning on the elbow, with no signs of infection (no erythema, pyrexia, or rigors).  - Recommend conservative management with compression sleeve, cryotherapy (ice application for 15 minutes several times daily), and elbow pads during dialysis sessions.  - Prescribed acetaminophen as needed for pain.  - Clarified that elbow effusion is not related to dialysis procedure itself.  - Educated patient about olecranon bursitis, its etiology, and management including signs of infectious bursitis to monitor for.  - Provided information on bursitis in [place], including exercises and instructions.  - Will consider orthopedic referral if bursitis persists or causes cosmetic concerns.  - Follow up in 1 month if condition does not improve.    ## ESSENTIAL HYPERTENSION:  - Blood pressure is well controlled at approximately 130/80 at home, sometimes decreasing after dialysis.  - Evaluation of readings from  dialysis center confirms satisfactory control with current medication regimen.  - Continuing amlodipine, hydralazine, and carvedilol for hypertension management.    ## TYPE 2 DIABETES MELLITUS:  - Diabetes is well-controlled with current A1C of 5.4.  - After evaluation, increased Ozempic from 1 mg to 2 mg for continued optimal glycemic control.  - Ordered follow-up A1C test for monitoring.  - Instructed patient to contact office if experiencing nausea or vomiting after increasing Ozempic dose.    ## END STAGE RENAL DISEASE AND DEPENDENCE ON RENAL DIALYSIS:  - Wai undergoes regular hemodialysis with blood pressure measurements during treatments.  - Noted effects on blood pressure post-treatment.  - Assessed dialysis schedule and laboratory monitoring during treatments.  - Continuing hemodialysis treatment as prescribed.    ## PREVENTIVE CARE:  - Wai declined pneumonia and COVID vaccines.  - Reviewed need for colon cancer screening; patient to complete and return stool sample.  - Ordered lipid panel and PSA test for prostate cancer screening.       1. Primary hypertension  -     amLODIPine (NORVASC) 10 MG tablet; Take 1 tablet (10 mg total) by mouth once daily.  Dispense: 90 tablet; Refill: 0  -     Basic Metabolic Panel; Future; Expected date: 07/30/2025  -     hydrALAZINE (APRESOLINE) 50 MG tablet; Take 3 tablets (150 mg total) by mouth 2 (two) times a day.  Dispense: 540 tablet; Refill: 0    2. Type 2 diabetes mellitus with both eyes affected by proliferative retinopathy and macular edema, without long-term current use of insulin  -     semaglutide (OZEMPIC) 2 mg/dose (8 mg/3 mL) PnIj; Inject 2 mg into the skin every 7 days.  Dispense: 3 mL; Refill: 11  -     Hemoglobin A1C; Future; Expected date: 07/30/2025  -     Lipid Panel; Future; Expected date: 07/30/2025  -     Basic Metabolic Panel; Future; Expected date: 07/30/2025    3. Screening for prostate cancer  -     PSA, Screening; Future; Expected date:  07/30/2025    4. Olecranon bursitis of left elbow    Other orders  -     carvediloL (COREG) 25 MG tablet; Take 1 tablet (25 mg total) by mouth 2 (two) times daily.  Dispense: 180 tablet; Refill: 0              No follow-ups on file.    This note was generated with the assistance of ambient listening technology. Verbal consent was obtained by the patient and accompanying visitor(s) for the recording of patient appointment to facilitate this note. I attest to having reviewed and edited the generated note for accuracy, though some syntax or spelling errors may persist. Please contact the author of this note for any clarification.

## 2025-08-12 ENCOUNTER — PATIENT OUTREACH (OUTPATIENT)
Dept: ADMINISTRATIVE | Facility: HOSPITAL | Age: 55
End: 2025-08-12
Payer: MEDICARE

## (undated) DEVICE — SOL GONAK

## (undated) DEVICE — SOL BALANCED SALT 500ML

## (undated) DEVICE — FORCEP GRIESHABER MAXGRIP 25G

## (undated) DEVICE — KIT GREY EYE

## (undated) DEVICE — CONTAINER SPECIMEN STRL 4OZ

## (undated) DEVICE — GLOVE BIOGEL ECLIPSE SZ 6.5

## (undated) DEVICE — KIT PERFLUOROCARBON LIQUID

## (undated) DEVICE — NDL HYPO A BEVEL 30X1/2

## (undated) DEVICE — HOLDER TUBE

## (undated) DEVICE — KIT PROBE COVER WITH GEL

## (undated) DEVICE — SYR 1CC TB SG 27GX1/2

## (undated) DEVICE — COVER PROXIMA MAYO STAND

## (undated) DEVICE — NEEDLE HYPODERMIC HUB LUER LOC

## (undated) DEVICE — DRESSING EYE OVAL LF

## (undated) DEVICE — SYR DISP LL 5CC

## (undated) DEVICE — SYRINGE 30CC LL W/O NDL

## (undated) DEVICE — DRAPE THREE-QTR REINF 53X77IN

## (undated) DEVICE — KNIFE OPHTH MICRO UNITOME 5MM

## (undated) DEVICE — SHEILD & GARTERS FOX METAL EYE

## (undated) DEVICE — Device

## (undated) DEVICE — GOWN SURGICAL X-LARGE

## (undated) DEVICE — SOL BSS BALANCED SALT

## (undated) DEVICE — BACKFLUSH 25GA SOFT-TIP DISP

## (undated) DEVICE — PACK TOTAL PLUS 25G VITRECTOMY

## (undated) DEVICE — PACK INSTRUMENT COVER DISPO

## (undated) DEVICE — SUT 7/0 18IN COATED VICRYL

## (undated) DEVICE — GUIDEWIRE EMERALD 150CM PTFE

## (undated) DEVICE — SYR 10CC LUER LOCK

## (undated) DEVICE — COVER MAYO STAND REINFRCD 30

## (undated) DEVICE — LENS VITRCTMY OPHTH 30DEG 59DE

## (undated) DEVICE — NDL 22GA X1 1/2 REG BEVEL

## (undated) DEVICE — FORCEP GRASPING 25GA SMOOTH

## (undated) DEVICE — NDL HYPO STD REG BVL 30G 0.5IN

## (undated) DEVICE — DRAPE PED LAP SURG 108X77IN

## (undated) DEVICE — TRAY MUSCLE LID EYE

## (undated) DEVICE — CORD FOR BIPOLAR FORCEPS 12

## (undated) DEVICE — SUT VICRYL + 2-0 36IN CP-1

## (undated) DEVICE — PROBE ILLUM FLEX CURVE LASER

## (undated) DEVICE — SOL WATER STRL IRR 1000ML

## (undated) DEVICE — SOL BETADINE 5%

## (undated) DEVICE — TRAY CATH LAB OMC

## (undated) DEVICE — STRIP MEDI WND CLSR 1/2X4IN